# Patient Record
Sex: FEMALE | Race: BLACK OR AFRICAN AMERICAN | NOT HISPANIC OR LATINO | Employment: FULL TIME | URBAN - METROPOLITAN AREA
[De-identification: names, ages, dates, MRNs, and addresses within clinical notes are randomized per-mention and may not be internally consistent; named-entity substitution may affect disease eponyms.]

---

## 2017-08-24 ENCOUNTER — TELEPHONE (OUTPATIENT)
Dept: OBSTETRICS AND GYNECOLOGY | Facility: CLINIC | Age: 27
End: 2017-08-24

## 2017-08-24 DIAGNOSIS — Z32.01 POSITIVE PREGNANCY TEST: Primary | ICD-10-CM

## 2017-08-24 NOTE — TELEPHONE ENCOUNTER
----- Message from Kevin Amaro sent at 8/24/2017 10:49 AM CDT -----  Contact: Pt  X_ 1st Request  _ 2nd Request  _ 3rd Request    Who: JOANNE CHOU [[id number]]    Why: Patient would like to schedule her initial OB appointment, [facility] 7/27    What Number to Call Back: [phone]    When to Expect a call back: (Before the end of the day)  -- if call after 3:00 call back will be tomorrow.

## 2017-08-28 PROCEDURE — 81025 URINE PREGNANCY TEST: CPT | Performed by: EMERGENCY MEDICINE

## 2017-08-28 PROCEDURE — 99285 EMERGENCY DEPT VISIT HI MDM: CPT | Mod: 25

## 2017-08-29 ENCOUNTER — HOSPITAL ENCOUNTER (EMERGENCY)
Facility: HOSPITAL | Age: 27
Discharge: HOME | End: 2017-08-29
Attending: EMERGENCY MEDICINE
Payer: MEDICAID

## 2017-08-29 VITALS
HEART RATE: 100 BPM | DIASTOLIC BLOOD PRESSURE: 98 MMHG | BODY MASS INDEX: 41.32 KG/M2 | RESPIRATION RATE: 16 BRPM | WEIGHT: 288 LBS | TEMPERATURE: 99 F | OXYGEN SATURATION: 100 % | SYSTOLIC BLOOD PRESSURE: 160 MMHG

## 2017-08-29 DIAGNOSIS — B96.89 BV (BACTERIAL VAGINOSIS): ICD-10-CM

## 2017-08-29 DIAGNOSIS — O10.011 PRE-EXISTING ESSENTIAL HYPERTENSION DURING PREGNANCY IN FIRST TRIMESTER: ICD-10-CM

## 2017-08-29 DIAGNOSIS — N76.0 BV (BACTERIAL VAGINOSIS): ICD-10-CM

## 2017-08-29 DIAGNOSIS — O20.0 THREATENED ABORTION AFFECTING INTRAUTERINE PREGNANCY: Primary | ICD-10-CM

## 2017-08-29 LAB
ABO + RH BLD: NORMAL
ALBUMIN SERPL BCP-MCNC: 4.1 G/DL
ALP SERPL-CCNC: 95 U/L
ALT SERPL W/O P-5'-P-CCNC: 15 U/L
ANION GAP SERPL CALC-SCNC: 10 MMOL/L
AST SERPL-CCNC: 14 U/L
B-HCG UR QL: POSITIVE
BACTERIA #/AREA URNS HPF: NORMAL /HPF
BACTERIA GENITAL QL WET PREP: ABNORMAL
BASOPHILS # BLD AUTO: 0.03 K/UL
BASOPHILS NFR BLD: 0.2 %
BILIRUB SERPL-MCNC: 0.4 MG/DL
BILIRUB UR QL STRIP: NEGATIVE
BUN SERPL-MCNC: 10 MG/DL
C TRACH DNA SPEC QL NAA+PROBE: DETECTED
CALCIUM SERPL-MCNC: 9.8 MG/DL
CHLORIDE SERPL-SCNC: 102 MMOL/L
CLARITY UR: CLEAR
CLUE CELLS VAG QL WET PREP: ABNORMAL
CO2 SERPL-SCNC: 24 MMOL/L
COLOR UR: ABNORMAL
CREAT SERPL-MCNC: 0.9 MG/DL
CTP QC/QA: YES
DIFFERENTIAL METHOD: ABNORMAL
EOSINOPHIL # BLD AUTO: 0.3 K/UL
EOSINOPHIL NFR BLD: 1.9 %
ERYTHROCYTE [DISTWIDTH] IN BLOOD BY AUTOMATED COUNT: 16 %
EST. GFR  (AFRICAN AMERICAN): >60 ML/MIN/1.73 M^2
EST. GFR  (NON AFRICAN AMERICAN): >60 ML/MIN/1.73 M^2
FILAMENT FUNGI VAG WET PREP-#/AREA: ABNORMAL
GLUCOSE SERPL-MCNC: 99 MG/DL
GLUCOSE UR QL STRIP: NEGATIVE
HCG INTACT+B SERPL-ACNC: 1854 MIU/ML
HCT VFR BLD AUTO: 39.8 %
HGB BLD-MCNC: 12.8 G/DL
HGB UR QL STRIP: ABNORMAL
KETONES UR QL STRIP: NEGATIVE
LEUKOCYTE ESTERASE UR QL STRIP: NEGATIVE
LYMPHOCYTES # BLD AUTO: 3.4 K/UL
LYMPHOCYTES NFR BLD: 25.4 %
MCH RBC QN AUTO: 24.7 PG
MCHC RBC AUTO-ENTMCNC: 32.2 G/DL
MCV RBC AUTO: 77 FL
MICROSCOPIC COMMENT: NORMAL
MONOCYTES # BLD AUTO: 0.9 K/UL
MONOCYTES NFR BLD: 6.7 %
N GONORRHOEA DNA SPEC QL NAA+PROBE: NOT DETECTED
NEUTROPHILS # BLD AUTO: 8.7 K/UL
NEUTROPHILS NFR BLD: 65.8 %
NITRITE UR QL STRIP: NEGATIVE
PH UR STRIP: 7 [PH] (ref 5–8)
PLATELET # BLD AUTO: 347 K/UL
PMV BLD AUTO: 11.6 FL
POTASSIUM SERPL-SCNC: 3.8 MMOL/L
PROT SERPL-MCNC: 8.6 G/DL
PROT UR QL STRIP: NEGATIVE
RBC # BLD AUTO: 5.18 M/UL
RBC #/AREA URNS HPF: 3 /HPF (ref 0–4)
SODIUM SERPL-SCNC: 136 MMOL/L
SP GR UR STRIP: 1.01 (ref 1–1.03)
SPECIMEN SOURCE: ABNORMAL
T VAGINALIS GENITAL QL WET PREP: ABNORMAL
URATE SERPL-MCNC: 5.5 MG/DL
URN SPEC COLLECT METH UR: ABNORMAL
UROBILINOGEN UR STRIP-ACNC: NEGATIVE EU/DL
WBC # BLD AUTO: 13.26 K/UL
WBC #/AREA URNS HPF: 2 /HPF (ref 0–5)
WBC #/AREA VAG WET PREP: ABNORMAL
YEAST GENITAL QL WET PREP: ABNORMAL

## 2017-08-29 PROCEDURE — 96376 TX/PRO/DX INJ SAME DRUG ADON: CPT

## 2017-08-29 PROCEDURE — 63600175 PHARM REV CODE 636 W HCPCS: Performed by: EMERGENCY MEDICINE

## 2017-08-29 PROCEDURE — 81000 URINALYSIS NONAUTO W/SCOPE: CPT

## 2017-08-29 PROCEDURE — 80053 COMPREHEN METABOLIC PANEL: CPT

## 2017-08-29 PROCEDURE — 86901 BLOOD TYPING SEROLOGIC RH(D): CPT

## 2017-08-29 PROCEDURE — 87210 SMEAR WET MOUNT SALINE/INK: CPT

## 2017-08-29 PROCEDURE — 86900 BLOOD TYPING SEROLOGIC ABO: CPT

## 2017-08-29 PROCEDURE — 84550 ASSAY OF BLOOD/URIC ACID: CPT

## 2017-08-29 PROCEDURE — 96374 THER/PROPH/DIAG INJ IV PUSH: CPT

## 2017-08-29 PROCEDURE — 25000003 PHARM REV CODE 250: Performed by: EMERGENCY MEDICINE

## 2017-08-29 PROCEDURE — 84702 CHORIONIC GONADOTROPIN TEST: CPT

## 2017-08-29 PROCEDURE — 96375 TX/PRO/DX INJ NEW DRUG ADDON: CPT

## 2017-08-29 PROCEDURE — 85025 COMPLETE CBC W/AUTO DIFF WBC: CPT

## 2017-08-29 PROCEDURE — 63600175 PHARM REV CODE 636 W HCPCS: Performed by: NURSE PRACTITIONER

## 2017-08-29 PROCEDURE — 87591 N.GONORRHOEAE DNA AMP PROB: CPT

## 2017-08-29 PROCEDURE — 96361 HYDRATE IV INFUSION ADD-ON: CPT

## 2017-08-29 RX ORDER — SODIUM CHLORIDE 9 MG/ML
125 INJECTION, SOLUTION INTRAVENOUS CONTINUOUS
Status: DISCONTINUED | OUTPATIENT
Start: 2017-08-29 | End: 2017-08-29 | Stop reason: HOSPADM

## 2017-08-29 RX ORDER — METRONIDAZOLE 500 MG/1
500 TABLET ORAL EVERY 12 HOURS
Qty: 14 TABLET | Refills: 0 | Status: SHIPPED | OUTPATIENT
Start: 2017-08-29 | End: 2017-09-05

## 2017-08-29 RX ORDER — HYDRALAZINE HYDROCHLORIDE 20 MG/ML
10 INJECTION INTRAMUSCULAR; INTRAVENOUS
Status: COMPLETED | OUTPATIENT
Start: 2017-08-29 | End: 2017-08-29

## 2017-08-29 RX ORDER — ONDANSETRON 2 MG/ML
4 INJECTION INTRAMUSCULAR; INTRAVENOUS
Status: COMPLETED | OUTPATIENT
Start: 2017-08-29 | End: 2017-08-29

## 2017-08-29 RX ADMIN — ONDANSETRON 4 MG: 2 INJECTION INTRAMUSCULAR; INTRAVENOUS at 05:08

## 2017-08-29 RX ADMIN — HYDRALAZINE HYDROCHLORIDE 10 MG: 20 INJECTION INTRAMUSCULAR; INTRAVENOUS at 04:08

## 2017-08-29 RX ADMIN — HYDRALAZINE HYDROCHLORIDE 10 MG: 20 INJECTION INTRAMUSCULAR; INTRAVENOUS at 05:08

## 2017-08-29 RX ADMIN — SODIUM CHLORIDE 1000 ML: 0.9 INJECTION, SOLUTION INTRAVENOUS at 01:08

## 2017-08-29 NOTE — ED TRIAGE NOTES
Pt states that she was given procardia at her [facility] and started it today around 3pm but has been having an increasingly painful headache today and she states that it feels like her heart is racing and she was going to pass out. .  Pt also has valerie spotting dark blood.  Pt went to have a confirmation of her pregnancy today.

## 2017-08-29 NOTE — ED PROVIDER NOTES
Encounter Date: 2017    SCRIBE #1 NOTE: I, Manenitish Coronado, am scribing for, and in the presence of, Tank Puente NP. Other sections scribed: HPI, ROS.       History     Chief Complaint   Patient presents with    Vaginal Bleeding     Pt states being 5 wks pregnant spotting x's 3 days with a throbbing HA. Saw OB today. Pt reports bleeding is bright red now with lower abd cramping     CC: Vaginal Bleeding, Abdominal Cramps  HPI: This 27 y.o. Gravid female 5w EGA with HTN, PCOS and Hx of abnormal pap smear, , tobacco use presents to the ED c/o vaginal spotting, milky white vaginal discharge, low abdominal cramps for the past few days. Pt states that she [facility] and was told that these Sx were not unusual. Pt states that her spotting has become a dark red, and her abdominal cramps have worsened (now severe). Pt also intermittent nausea (resolved at time of evaluation) and diarrhea for the past few days. Pt also reports pounding HA and palpitations after she took Procardia for the first time yesterday; pt reports Hx of HTN with poor compliance with her Lisinopril. Pt also reports Hx of preeclampsia late in a previous pregnancy; she reports severe HAs and dizziness associated with this event. Pt reports initial appointment with her OB on . Pt has not yet had a US done. Pt denies fever, chills, cough, SOB, chest pain.        The history is provided by the patient.     Review of patient's allergies indicates:  No Known Allergies  Past Medical History:   Diagnosis Date    Abnormal Pap smear of cervix     Anemia     Heavy periods     Hypertension     PCOS (polycystic ovarian syndrome)      Past Surgical History:   Procedure Laterality Date     SECTION       Family History   Problem Relation Age of Onset    Breast cancer Mother     Breast cancer Maternal Aunt     Colon cancer Neg Hx     Ovarian cancer Neg Hx      Social History   Substance Use  Topics    Smoking status: Former Smoker    Smokeless tobacco: Former User    Alcohol use No     Review of Systems   Constitutional: Negative for chills and fever.   HENT: Negative for ear pain and sore throat.    Eyes: Negative for pain and visual disturbance.   Respiratory: Negative for cough and shortness of breath.    Cardiovascular: Positive for palpitations. Negative for chest pain.   Gastrointestinal: Positive for abdominal pain, diarrhea and nausea (resolved at time of evaluation). Negative for vomiting.   Genitourinary: Positive for vaginal bleeding and vaginal discharge. Negative for difficulty urinating and dysuria.   Musculoskeletal: Negative for arthralgias and myalgias.   Skin: Negative for rash and wound.   Neurological: Positive for headaches. Negative for seizures and syncope.       Physical Exam     Initial Vitals [08/28/17 2246]   BP Pulse Resp Temp SpO2   (!) 186/109 100 16 98.9 °F (37.2 °C) 99 %      MAP       134.67         Physical Exam    Nursing note and vitals reviewed.  Constitutional: She appears well-developed and well-nourished. She is not diaphoretic. She is Obese .  Non-toxic appearance. She does not have a sickly appearance. No distress.   HENT:   Head: Normocephalic and atraumatic.   Right Ear: External ear normal.   Left Ear: External ear normal.   Nose: Nose normal.   Eyes: Conjunctivae and EOM are normal. Pupils are equal, round, and reactive to light. Right eye exhibits no discharge. Left eye exhibits no discharge. No scleral icterus.   Neck: Normal range of motion. Neck supple. No thyromegaly present. No tracheal deviation present. No JVD present.   Cardiovascular: Normal rate, regular rhythm, normal heart sounds and intact distal pulses. Exam reveals no gallop and no friction rub.    No murmur heard.  Pulmonary/Chest: Breath sounds normal. No stridor. No respiratory distress. She has no wheezes. She has no rhonchi. She has no rales.   Abdominal: Soft. Bowel sounds are  normal. She exhibits no distension and no mass. There is no tenderness. There is no rebound and no guarding.   Genitourinary: Uterus normal. Pelvic exam was performed with patient supine. Cervix exhibits no motion tenderness, no discharge and no friability. Right adnexum displays no tenderness and no fullness. Left adnexum displays no tenderness and no fullness. There is bleeding in the vagina. No erythema or tenderness in the vagina. No foreign body in the vagina. No signs of injury around the vagina. Vaginal discharge found.   Genitourinary Comments: Cervical os closed   Musculoskeletal: Normal range of motion. She exhibits no edema or tenderness.   Lymphadenopathy:     She has no cervical adenopathy.   Neurological: She is alert and oriented to person, place, and time. She has normal strength and normal reflexes. She displays normal reflexes. No cranial nerve deficit or sensory deficit.   Skin: Skin is warm and dry. No rash and no abscess noted. No erythema. No pallor.   Psychiatric: She has a normal mood and affect. Her behavior is normal. Judgment and thought content normal.         ED Course   Procedures  Labs Reviewed   URINALYSIS - Abnormal; Notable for the following:        Result Value    Occult Blood UA 2+ (*)     All other components within normal limits   CBC W/ AUTO DIFFERENTIAL - Abnormal; Notable for the following:     WBC 13.26 (*)     MCV 77 (*)     MCH 24.7 (*)     RDW 16.0 (*)     Gran # 8.7 (*)     All other components within normal limits   COMPREHENSIVE METABOLIC PANEL - Abnormal; Notable for the following:     Total Protein 8.6 (*)     All other components within normal limits   VAGINAL SCREEN - Abnormal; Notable for the following:     Clue Cells, Wet Prep Many (*)     WBC - Vaginal Screen Rare (*)     Bacteria - Vaginal Screen Moderate (*)     All other components within normal limits   POCT URINE PREGNANCY - Abnormal; Notable for the following:     POC Preg Test, Ur Positive (*)     All  other components within normal limits   C. TRACHOMATIS/N. GONORRHOEAE BY AMP DNA   HCG, QUANTITATIVE, PREGNANCY   URIC ACID   URINALYSIS MICROSCOPIC   GROUP & RH             Medical Decision Making:   Differential Diagnosis:   Ectopic pregnancy, preeclampsia, tubo-ovarian abscess, ovarian torsion, molar pregnancy, PID  Clinical Tests:   Lab Tests: Ordered and Reviewed  Radiological Study: Ordered and Reviewed  ED Management:  27-year-old gravid female with a history of essential hypertension presenting to emergency department for spotty vaginal bleeding that began today. Also reports headache. Patient was taking lisinopril daily but changed to Procardia today due to pregnancy. Also reports abdominal cramping. She denies nausea, vomiting, diarrhea, abdominal pain, chest pain, shortness of breath, or other associated symptoms. Patient is hypertensive. She is afebrile, well-appearing, and in no apparent distress. There is mild blood and discharge in the vaginal vault. There is no discharge, bleeding, or friability of the cervix. Cervical os is closed. No CMT. No adnexal or uterine tenderness on bimanual. Vaginal screen remarkable for bacteria and clue cells. Suspect bacterial vaginosis. Urinalysis unremarkable. No proteinuria. Uric acid is within normal limits. CBC remarkable for leukocytosis, possibly due to pregnancy. Hydralazine given to reduce blood pressure. On reevaluation patient states that her headache is resolved. Ultrasound remarkable for hypochromic structure within the uterine cavity possibly representing gestational sac. No fetal pole or yolk sac is visible. Given patient's reported 5 weeks gestational age it is likely too early to visualize fetal cardiac activity. No evidence of ectopic pregnancy. Prescribed Flagyl at discharge. Instructed patient to take Procardia as prescribed. Instructed patient to follow-up with OB as soon as possible. Strict ED return precautions given. Patient expressed  understanding of diagnosis and discharge instructions.  Other:   I have discussed this case with another health care provider.       <> Summary of the Discussion: Case discussed my attending physician Chris Lobo M.D. who agreed with the assessment and plan.            Scribe Attestation:   Scribe #1: I performed the above scribed service and the documentation accurately describes the services I performed. I attest to the accuracy of the note.    Attending Attestation:           Physician Attestation for Scribe:  Physician Attestation Statement for Scribe #1: I, Tank Puente NP, reviewed documentation, as scribed by Mane Coronado in my presence, and it is both accurate and complete.                 ED Course     Clinical Impression:   The primary encounter diagnosis was Threatened  affecting intrauterine pregnancy. Diagnoses of BV (bacterial vaginosis) and Pre-existing essential hypertension during pregnancy in first trimester were also pertinent to this visit.    Disposition:   Disposition: Discharged  Condition: Stable                      Tank Puente NP  [phone]

## 2017-08-29 NOTE — DISCHARGE INSTRUCTIONS
Follow-up with your OB as discussed.    Begin taking prenatal vitamins daily if you are not already doing so.    Take antibiotic twice a day for 7 days as prescribed until it is gone.    Return to the emergency department for any new or worsening symptoms.

## 2017-09-01 ENCOUNTER — PATIENT MESSAGE (OUTPATIENT)
Dept: OBSTETRICS AND GYNECOLOGY | Facility: CLINIC | Age: 27
End: 2017-09-01

## 2017-09-01 DIAGNOSIS — O20.0 THREATENED MISCARRIAGE: Primary | ICD-10-CM

## 2017-09-06 ENCOUNTER — LAB VISIT (OUTPATIENT)
Dept: LAB | Facility: HOSPITAL | Age: 27
End: 2017-09-06
Attending: OBSTETRICS & GYNECOLOGY
Payer: MEDICAID

## 2017-09-06 DIAGNOSIS — O20.0 THREATENED MISCARRIAGE: ICD-10-CM

## 2017-09-06 LAB — HCG INTACT+B SERPL-ACNC: NORMAL MIU/ML

## 2017-09-06 PROCEDURE — 36415 COLL VENOUS BLD VENIPUNCTURE: CPT | Mod: PO

## 2017-09-06 PROCEDURE — 84702 CHORIONIC GONADOTROPIN TEST: CPT

## 2017-09-14 ENCOUNTER — PATIENT MESSAGE (OUTPATIENT)
Dept: OBSTETRICS AND GYNECOLOGY | Facility: CLINIC | Age: 27
End: 2017-09-14

## 2017-09-14 ENCOUNTER — OFFICE VISIT (OUTPATIENT)
Dept: OBSTETRICS AND GYNECOLOGY | Facility: CLINIC | Age: 27
End: 2017-09-14
Payer: MEDICAID

## 2017-09-14 ENCOUNTER — HOSPITAL ENCOUNTER (OUTPATIENT)
Dept: RADIOLOGY | Facility: OTHER | Age: 27
Discharge: HOME | End: 2017-09-14
Attending: OBSTETRICS & GYNECOLOGY
Payer: MEDICAID

## 2017-09-14 VITALS
DIASTOLIC BLOOD PRESSURE: 118 MMHG | BODY MASS INDEX: 41.82 KG/M2 | WEIGHT: 291.44 LBS | SYSTOLIC BLOOD PRESSURE: 150 MMHG

## 2017-09-14 DIAGNOSIS — I15.9 SECONDARY HYPERTENSION: ICD-10-CM

## 2017-09-14 DIAGNOSIS — N93.9 VAGINAL BLEEDING: ICD-10-CM

## 2017-09-14 DIAGNOSIS — N93.9 VAGINAL BLEEDING: Primary | ICD-10-CM

## 2017-09-14 PROCEDURE — 99214 OFFICE O/P EST MOD 30 MIN: CPT | Mod: S$PBB,,, | Performed by: OBSTETRICS & GYNECOLOGY

## 2017-09-14 PROCEDURE — 82570 ASSAY OF URINE CREATININE: CPT

## 2017-09-14 PROCEDURE — 99999 PR PBB SHADOW E&M-EST. PATIENT-LVL II: CPT | Mod: PBBFAC,,, | Performed by: OBSTETRICS & GYNECOLOGY

## 2017-09-14 PROCEDURE — 87591 N.GONORRHOEAE DNA AMP PROB: CPT

## 2017-09-14 PROCEDURE — 3008F BODY MASS INDEX DOCD: CPT | Mod: ,,, | Performed by: OBSTETRICS & GYNECOLOGY

## 2017-09-14 PROCEDURE — 99212 OFFICE O/P EST SF 10 MIN: CPT | Mod: PBBFAC,25 | Performed by: OBSTETRICS & GYNECOLOGY

## 2017-09-14 PROCEDURE — 87086 URINE CULTURE/COLONY COUNT: CPT

## 2017-09-14 PROCEDURE — 76801 OB US < 14 WKS SINGLE FETUS: CPT | Mod: TC

## 2017-09-14 PROCEDURE — 76817 TRANSVAGINAL US OBSTETRIC: CPT | Mod: TC

## 2017-09-14 PROCEDURE — 76817 TRANSVAGINAL US OBSTETRIC: CPT | Mod: 26,,, | Performed by: RADIOLOGY

## 2017-09-14 PROCEDURE — 76801 OB US < 14 WKS SINGLE FETUS: CPT | Mod: 26,,, | Performed by: RADIOLOGY

## 2017-09-14 RX ORDER — LABETALOL 200 MG/1
200 TABLET, FILM COATED ORAL 2 TIMES DAILY
Qty: 60 TABLET | Refills: 11 | Status: SHIPPED | OUTPATIENT
Start: 2017-09-14 | End: 2017-10-17

## 2017-09-14 NOTE — PROGRESS NOTES
CC: Absence of menses    Kaylie Fuentes is a 27 y.o. female  presents with complaint of absence of menstruation.  She denies nausea/vomIting/abdominal pain/  HAVING bleeding off and on.  UPT is positive.   BHCG 47106   Elevated BP but did not take procardia due to HA  US shows    Findings: The uterus measures 10.8 x 5.8 x 6.8 cm. Uterine parenchyma is homogeneous. In the uterine cavity there is a round hypoechoic structure possibly representing a gestational sac with a mean diameter of 0.6 cm. No fetal pole or yolk sac is identified. The endometrial stripe measures 1.5 cm. The cervix measures 3.5 cm.    The right ovary measures 4.0 x 2.9 x 2.9 cm. The left ovary measures 2.9 x 1.6 x 1.9 cm. Arterial and venous flow are preserved bilaterally. There is a possible corpus luteum cyst measuring 2.2 cm seen in the right ovary. No significant free fluid is present within the pelvis.   Impression         Hypoechoic structure within the uterine cavity measuring 0.6 cm which may represent a gestational sac. No fetal pole or yolk sac is identified. This may represent an intrauterine pregnancy which is too early to be seen by ultrasound. Additional considerations include spontaneous  or unseen ectopic pregnancy, noting no suspicious adnexal masses are identified on today's examination. Serial beta hCG sampling is recommended with repeat ultrasound as clinically indicated.             Past Medical History:   Diagnosis Date    Abnormal Pap smear of cervix     Anemia     Heavy periods     Hypertension     PCOS (polycystic ovarian syndrome)      Past Surgical History:   Procedure Laterality Date     SECTION       Social History     Social History    Marital status: Single     Spouse name: N/A    Number of children: N/A    Years of education: N/A     Social History Main Topics    Smoking status: Former Smoker    Smokeless tobacco: Former User    Alcohol use No    Drug use: No    Sexual activity:  Yes     Partners: Male     Other Topics Concern    None     Social History Narrative    None     Family History   Problem Relation Age of Onset    Breast cancer Mother     Breast cancer Maternal Aunt     Colon cancer Neg Hx     Ovarian cancer Neg Hx      OB History    Para Term  AB Living   2 1       1   SAB TAB Ectopic Multiple Live Births           1      # Outcome Date GA Lbr Aiden/2nd Weight Sex Delivery Anes PTL Lv   2 Current            1 Para 11    M CS-LTranv   KALINA          BP (!) 150/118   Wt 132.2 kg (291 lb 7.2 oz)   LMP 2017   BMI 41.82 kg/m²     ROS:  GENERAL: Denies weight gain or weight loss. Feeling well overall.   SKIN: Denies rash or lesions.   HEAD: Denies head injury or headache.   NODES: Denies enlarged lymph nodes.   CHEST: Denies chest pain or shortness of breath.   CARDIOVASCULAR: Denies palpitations or left sided chest pain.   ABDOMEN: No abdominal pain, constipation, diarrhea, nausea, vomiting or rectal bleeding.   URINARY: No frequency, dysuria, hematuria, or burning on urination.  REPRODUCTIVE: See HPI.   BREASTS: The patient performs breast self-examination and denies pain, lumps, or nipple discharge.   HEMATOLOGIC: No easy bruisability or excessive bleeding.   MUSCULOSKELETAL: Denies joint pain or swelling.   NEUROLOGIC: Denies syncope or weakness.   PSYCHIATRIC: Denies depression, anxiety or mood swings.    PE:   APPEARANCE: Well nourished, well developed, in no acute distress.  AFFECT: WNL, alert and oriented x 3.  SKIN: No acne or hirsutism.  NECK: Neck symmetric without masses or thyromegaly.  NODES: No inguinal, cervical, axillary or femoral lymph node enlargement.  CHEST: Good respiratory effort.   ABDOMEN: Soft. No tenderness or masses. No hepatosplenomegaly. No hernias.  BREASTS: Symmetrical, no skin changes or visible lesions. No palpable masses, nipple discharge bilaterally.  PELVIC: Normal external female genitalia without lesions. Normal hair  distribution. Adequate perineal body, normal urethral meatus. Vagina moist and well rugated without lesions or discharge. Cervix pink, without lesions, discharge or tenderness. No significant cystocele or rectocele. Bimanual exam shows uterus is 7 weeks, regular, mobile and nontender. Adnexa without masses or tenderness.  EXTREMITIES: No edema.          ASSESSMENT and PLAN:    ICD-10-CM ICD-9-CM    1. Vaginal bleeding N93.9 623.8 HIV-1 and HIV-2 antibodies      RPR      Hepatitis B surface antigen      Type & Screen      Rubella antibody, IgG      Urine culture      CBC auto differential      Basic metabolic panel      US MFM Procedure ([facility])      C. trachomatis/N. gonorrhoeae by AMP DNA Cervix      POCT urine pregnancy      Protein / creatinine ratio, urine      Comprehensive metabolic panel      US Pelvis Comp with Transvag NON-OB (xpd      hCG, quantitative      Progesterone   2. Secondary hypertension I15.9 405.99 labetalol (NORMODYNE) 200 MG tablet         Patient was counseled today on HTN concerns  + CT-  Was treated and did SHERICE today  No abdominal pain or pressure.  WIll do stat TVUS to check on pregnancy  Follow up on labs  Will start on labetolol 200 mg BID     F/U next week to check on BP

## 2017-09-15 LAB
C TRACH DNA SPEC QL NAA+PROBE: NOT DETECTED
CREAT UR-MCNC: 308 MG/DL
N GONORRHOEA DNA SPEC QL NAA+PROBE: NOT DETECTED
PROT UR-MCNC: 27 MG/DL
PROT/CREAT RATIO, UR: 0.09

## 2017-09-16 LAB
BACTERIA UR CULT: NORMAL
BACTERIA UR CULT: NORMAL

## 2017-09-19 ENCOUNTER — PATIENT MESSAGE (OUTPATIENT)
Dept: OBSTETRICS AND GYNECOLOGY | Facility: CLINIC | Age: 27
End: 2017-09-19

## 2017-09-22 ENCOUNTER — ROUTINE PRENATAL (OUTPATIENT)
Dept: OBSTETRICS AND GYNECOLOGY | Facility: CLINIC | Age: 27
End: 2017-09-22
Payer: MEDICAID

## 2017-09-22 VITALS
BODY MASS INDEX: 41.63 KG/M2 | DIASTOLIC BLOOD PRESSURE: 110 MMHG | WEIGHT: 290.13 LBS | SYSTOLIC BLOOD PRESSURE: 158 MMHG

## 2017-09-22 DIAGNOSIS — Z34.80 SUPERVISION OF OTHER NORMAL PREGNANCY: Primary | ICD-10-CM

## 2017-09-22 DIAGNOSIS — O16.1 ELEVATED BLOOD PRESSURE AFFECTING PREGNANCY IN FIRST TRIMESTER, ANTEPARTUM: ICD-10-CM

## 2017-09-22 DIAGNOSIS — Z01.30 BP CHECK: ICD-10-CM

## 2017-09-22 PROCEDURE — 99999 PR PBB SHADOW E&M-EST. PATIENT-LVL III: CPT | Mod: PBBFAC,,, | Performed by: NURSE PRACTITIONER

## 2017-09-22 PROCEDURE — 3008F BODY MASS INDEX DOCD: CPT | Mod: ,,, | Performed by: NURSE PRACTITIONER

## 2017-09-22 PROCEDURE — 99212 OFFICE O/P EST SF 10 MIN: CPT | Mod: TH,S$PBB,, | Performed by: NURSE PRACTITIONER

## 2017-09-22 PROCEDURE — 99213 OFFICE O/P EST LOW 20 MIN: CPT | Mod: PBBFAC | Performed by: NURSE PRACTITIONER

## 2017-09-22 RX ORDER — LABETALOL 200 MG/1
200 TABLET, FILM COATED ORAL 3 TIMES DAILY
Qty: 90 TABLET | Refills: 6 | Status: SHIPPED | OUTPATIENT
Start: 2017-09-22 | End: 2017-10-17

## 2017-09-22 NOTE — LETTER
September 22, 2017      [facility] [address]  [address]  [place] [address]  Phone: [phone]  Fax: [phone]       Patient: Kaylie Fuentes   YOB: 1990  Date of Visit: 09/22/2017    To Whom It May Concern:    Michelle Fuentes  was at [facility] on 09/22/2017. She is currently pregnant with estimated due date of 5/5/18.  Please restrict patient to no heaving lifting- nothing greater than 20 pounds, avoid change overs on the Rovema machine, and allow for 2- 15 minutes breaks per shift for the duration of the pregnancy  If you have any questions or concerns, or if I can be of further assistance, please do not hesitate to contact me.    Sincerely,      SUKI JansenC

## 2017-09-22 NOTE — PROGRESS NOTES
Here for routine OB appt at 7w6d for a BP check.  BP today is 158/110.  She is taking Labetalol 200 mg BID.  Denies any HA, BV, CP or SOB.  Increased Labetalol to 200 mg TID.  MFM consult ordered for uncontrolled HTN.  No complaints, denies VB and cramping.  Pain, bleeding, and ED precautions given.  F/U scheduled 1 week for BP check

## 2017-10-09 ENCOUNTER — ROUTINE PRENATAL (OUTPATIENT)
Dept: OBSTETRICS AND GYNECOLOGY | Facility: CLINIC | Age: 27
End: 2017-10-09
Payer: MEDICAID

## 2017-10-09 ENCOUNTER — HOSPITAL ENCOUNTER (OUTPATIENT)
Dept: RADIOLOGY | Facility: OTHER | Age: 27
Discharge: HOME | End: 2017-10-09
Attending: OBSTETRICS & GYNECOLOGY
Payer: MEDICAID

## 2017-10-09 VITALS — BODY MASS INDEX: 42.07 KG/M2 | WEIGHT: 293 LBS | SYSTOLIC BLOOD PRESSURE: 158 MMHG | DIASTOLIC BLOOD PRESSURE: 104 MMHG

## 2017-10-09 DIAGNOSIS — O16.1 HYPERTENSION AFFECTING PREGNANCY IN FIRST TRIMESTER: ICD-10-CM

## 2017-10-09 DIAGNOSIS — O16.1 HYPERTENSION AFFECTING PREGNANCY IN FIRST TRIMESTER: Primary | ICD-10-CM

## 2017-10-09 PROCEDURE — 76801 OB US < 14 WKS SINGLE FETUS: CPT | Mod: 26,,, | Performed by: RADIOLOGY

## 2017-10-09 PROCEDURE — 99213 OFFICE O/P EST LOW 20 MIN: CPT | Mod: PBBFAC,25 | Performed by: OBSTETRICS & GYNECOLOGY

## 2017-10-09 PROCEDURE — 76817 TRANSVAGINAL US OBSTETRIC: CPT | Mod: TC

## 2017-10-09 PROCEDURE — 76817 TRANSVAGINAL US OBSTETRIC: CPT | Mod: 26,,, | Performed by: RADIOLOGY

## 2017-10-09 PROCEDURE — 99213 OFFICE O/P EST LOW 20 MIN: CPT | Mod: TH,S$PBB,, | Performed by: OBSTETRICS & GYNECOLOGY

## 2017-10-09 PROCEDURE — 76801 OB US < 14 WKS SINGLE FETUS: CPT | Mod: TC

## 2017-10-09 PROCEDURE — 99999 PR PBB SHADOW E&M-EST. PATIENT-LVL III: CPT | Mod: PBBFAC,,, | Performed by: OBSTETRICS & GYNECOLOGY

## 2017-10-10 ENCOUNTER — TELEPHONE (OUTPATIENT)
Dept: OBSTETRICS AND GYNECOLOGY | Facility: CLINIC | Age: 27
End: 2017-10-10

## 2017-10-10 DIAGNOSIS — I10 HYPERTENSION, UNSPECIFIED TYPE: Primary | ICD-10-CM

## 2017-10-10 NOTE — TELEPHONE ENCOUNTER
----- Message from Jo Meek sent at 10/10/2017 10:20 AM CDT -----  Contact: Lily  X _1st Request  _  2nd Request  _  3rd Request    Who:Lily [facility]     Why:Per Lily Rahman ordered LABETALOL HCL (INV LABETALOL) 200 MG tablet she selected the wrong one she should have selected the one without the INV in front of it.    What Number to Call Back:[phone]    When to Expect a call back: (Before the end of the day)   -- if call after 3:00 call back will be tomorrow.

## 2017-10-10 NOTE — TELEPHONE ENCOUNTER
Pharmacy is requesting verification of patients labetalol prescription. The prescription sent was Labetalol Hcl (INV labetolol).

## 2017-10-11 ENCOUNTER — PROCEDURE VISIT (OUTPATIENT)
Dept: OBSTETRICS AND GYNECOLOGY | Facility: CLINIC | Age: 27
End: 2017-10-11
Payer: MEDICAID

## 2017-10-11 DIAGNOSIS — Z32.01 POSITIVE PREGNANCY TEST: ICD-10-CM

## 2017-10-11 DIAGNOSIS — Z36.89 ESTABLISH GESTATIONAL AGE, ULTRASOUND: ICD-10-CM

## 2017-10-11 DIAGNOSIS — N91.2 AMENORRHEA: ICD-10-CM

## 2017-10-11 PROCEDURE — 76817 TRANSVAGINAL US OBSTETRIC: CPT | Mod: 26,S$PBB,, | Performed by: OBSTETRICS & GYNECOLOGY

## 2017-10-11 PROCEDURE — 76817 TRANSVAGINAL US OBSTETRIC: CPT | Mod: PBBFAC | Performed by: OBSTETRICS & GYNECOLOGY

## 2017-10-11 RX ORDER — LABETALOL 200 MG/1
400 TABLET, FILM COATED ORAL 2 TIMES DAILY
Qty: 120 TABLET | Refills: 11 | Status: ON HOLD | OUTPATIENT
Start: 2017-10-11 | End: 2019-09-16

## 2017-10-11 NOTE — TELEPHONE ENCOUNTER
I sent in a new Rx for labetalol for a dose of 400 mg PO BID. Please comfirm with the pharm   Thanks  Nancy

## 2017-10-11 NOTE — PROCEDURES
Procedures   Obstetrical ultrasound completed today.  See report in imaging section of [facility].

## 2017-10-12 ENCOUNTER — TELEPHONE (OUTPATIENT)
Dept: OBSTETRICS AND GYNECOLOGY | Facility: CLINIC | Age: 27
End: 2017-10-12

## 2017-10-12 ENCOUNTER — PATIENT MESSAGE (OUTPATIENT)
Dept: OBSTETRICS AND GYNECOLOGY | Facility: CLINIC | Age: 27
End: 2017-10-12

## 2017-10-12 NOTE — PROGRESS NOTES
HERE for routine OB visit at 10 2/7 wks.  SEVERE BP but no Sx.  She is on labetalol 200 TID.  She does not want to take procardia Due to SE..  Denies vaginal bleeding, cramping.  PRE E labs.  Discussed concerns of uncontrolled HTN.  WIll have a consult with MFM.  F/U next week.

## 2017-10-12 NOTE — TELEPHONE ENCOUNTER
Called patient. No answer. Left voice message for patient to call the office.     Calling to make sure patient got her prescription.

## 2017-10-17 ENCOUNTER — CLINICAL SUPPORT (OUTPATIENT)
Dept: OBSTETRICS AND GYNECOLOGY | Facility: CLINIC | Age: 27
End: 2017-10-17
Payer: MEDICAID

## 2017-10-17 ENCOUNTER — TELEPHONE (OUTPATIENT)
Dept: OBSTETRICS AND GYNECOLOGY | Facility: CLINIC | Age: 27
End: 2017-10-17

## 2017-10-17 VITALS
SYSTOLIC BLOOD PRESSURE: 162 MMHG | BODY MASS INDEX: 41.95 KG/M2 | HEIGHT: 70 IN | DIASTOLIC BLOOD PRESSURE: 110 MMHG | WEIGHT: 293 LBS

## 2017-10-17 DIAGNOSIS — Z01.30 BP CHECK: ICD-10-CM

## 2017-10-17 DIAGNOSIS — Z34.80 SUPERVISION OF OTHER NORMAL PREGNANCY, ANTEPARTUM: Primary | ICD-10-CM

## 2017-10-17 DIAGNOSIS — O16.1 HYPERTENSION AFFECTING PREGNANCY IN FIRST TRIMESTER: ICD-10-CM

## 2017-10-17 LAB
CREAT UR-MCNC: 206 MG/DL
PROT UR-MCNC: 25 MG/DL
PROT/CREAT RATIO, UR: 0.12

## 2017-10-17 PROCEDURE — 99212 OFFICE O/P EST SF 10 MIN: CPT | Mod: TH,S$PBB,, | Performed by: NURSE PRACTITIONER

## 2017-10-17 PROCEDURE — 82570 ASSAY OF URINE CREATININE: CPT

## 2017-10-17 PROCEDURE — 99213 OFFICE O/P EST LOW 20 MIN: CPT | Mod: PBBFAC | Performed by: NURSE PRACTITIONER

## 2017-10-17 PROCEDURE — 99999 PR PBB SHADOW E&M-EST. PATIENT-LVL III: CPT | Mod: PBBFAC,,, | Performed by: NURSE PRACTITIONER

## 2017-10-17 RX ORDER — NIFEDIPINE 30 MG/1
30 TABLET, EXTENDED RELEASE ORAL DAILY
Qty: 30 TABLET | Refills: 11 | Status: ON HOLD | OUTPATIENT
Start: 2017-10-17 | End: 2018-03-03

## 2017-10-17 NOTE — TELEPHONE ENCOUNTER
----- Message from Tyler Marquez MA sent at 10/17/2017 11:55 AM CDT -----  Pt is needing a 1 wk routine appt and pressure check.  The pt can be reached at [phone].  Thanks FL

## 2017-10-17 NOTE — PROGRESS NOTES
Here for BP check at 11w3d.  BP today is 162/110.  She is currently taking Labetalol 400 mg BID.  Discussed to continue Labetalol and added Procardia XL 30 mg daily.  Baseline PC ratio today.   No complaints, denies VB and cramping.  Pain, bleeding, and ED precautions given.  F/U scheduled 1 week for BP check

## 2017-10-24 ENCOUNTER — PATIENT MESSAGE (OUTPATIENT)
Dept: OBSTETRICS AND GYNECOLOGY | Facility: CLINIC | Age: 27
End: 2017-10-24

## 2017-10-30 ENCOUNTER — ROUTINE PRENATAL (OUTPATIENT)
Dept: OBSTETRICS AND GYNECOLOGY | Facility: CLINIC | Age: 27
End: 2017-10-30
Payer: MEDICAID

## 2017-10-30 ENCOUNTER — PATIENT MESSAGE (OUTPATIENT)
Dept: OBSTETRICS AND GYNECOLOGY | Facility: CLINIC | Age: 27
End: 2017-10-30

## 2017-10-30 VITALS
DIASTOLIC BLOOD PRESSURE: 98 MMHG | WEIGHT: 291.25 LBS | SYSTOLIC BLOOD PRESSURE: 148 MMHG | BODY MASS INDEX: 41.79 KG/M2

## 2017-10-30 DIAGNOSIS — Z3A.13 13 WEEKS GESTATION OF PREGNANCY: ICD-10-CM

## 2017-10-30 DIAGNOSIS — I10 CHRONIC HYPERTENSION: Primary | ICD-10-CM

## 2017-10-30 PROCEDURE — 99999 PR PBB SHADOW E&M-EST. PATIENT-LVL I: CPT | Mod: PBBFAC,,, | Performed by: OBSTETRICS & GYNECOLOGY

## 2017-10-30 PROCEDURE — 99213 OFFICE O/P EST LOW 20 MIN: CPT | Mod: TH,S$PBB,, | Performed by: OBSTETRICS & GYNECOLOGY

## 2017-10-30 PROCEDURE — 99211 OFF/OP EST MAY X REQ PHY/QHP: CPT | Mod: PBBFAC | Performed by: OBSTETRICS & GYNECOLOGY

## 2017-10-30 NOTE — PROGRESS NOTES
HERE for routine OB visit at 13 2 5/7 wks, with NO complaints.  Denies vaginal bleeding, cramping.  On Labetalol 400 BID and procardia.  Feeling better. US shows FHTs 150s.  MFM consult scheduled.  MFM scan at 20- 22 weeks.  Quad screen with next visit.  F/U 4 wks

## 2017-11-06 ENCOUNTER — PATIENT MESSAGE (OUTPATIENT)
Dept: OBSTETRICS AND GYNECOLOGY | Facility: CLINIC | Age: 27
End: 2017-11-06

## 2017-11-06 DIAGNOSIS — O16.2 HYPERTENSION AFFECTING PREGNANCY IN SECOND TRIMESTER: Primary | ICD-10-CM

## 2017-11-07 ENCOUNTER — INITIAL CONSULT (OUTPATIENT)
Dept: MATERNAL FETAL MEDICINE | Facility: CLINIC | Age: 27
End: 2017-11-07
Payer: MEDICAID

## 2017-11-07 VITALS
DIASTOLIC BLOOD PRESSURE: 92 MMHG | SYSTOLIC BLOOD PRESSURE: 140 MMHG | BODY MASS INDEX: 41.09 KG/M2 | WEIGHT: 286.38 LBS

## 2017-11-07 DIAGNOSIS — O16.2 HYPERTENSION AFFECTING PREGNANCY IN SECOND TRIMESTER: ICD-10-CM

## 2017-11-07 DIAGNOSIS — E28.2 PCOS (POLYCYSTIC OVARIAN SYNDROME): ICD-10-CM

## 2017-11-07 PROCEDURE — 76815 OB US LIMITED FETUS(S): CPT | Mod: 26,S$PBB,, | Performed by: OBSTETRICS & GYNECOLOGY

## 2017-11-07 PROCEDURE — 76815 OB US LIMITED FETUS(S): CPT | Mod: PBBFAC | Performed by: OBSTETRICS & GYNECOLOGY

## 2017-11-07 PROCEDURE — 99212 OFFICE O/P EST SF 10 MIN: CPT | Mod: PBBFAC | Performed by: OBSTETRICS & GYNECOLOGY

## 2017-11-07 PROCEDURE — 99203 OFFICE O/P NEW LOW 30 MIN: CPT | Mod: S$PBB,TH,25, | Performed by: OBSTETRICS & GYNECOLOGY

## 2017-11-07 PROCEDURE — 99999 PR PBB SHADOW E&M-EST. PATIENT-LVL II: CPT | Mod: PBBFAC,,, | Performed by: OBSTETRICS & GYNECOLOGY

## 2017-11-07 NOTE — LETTER
November 8, 2017      Nancy Lamb MD  [address]  [address]  [place] [address]           [facility]  [address]  [place] [address]  Phone: [phone]          Patient: Kaylie Fuentes   MR Number: [id number]   YOB: 1990   Date of Visit: 11/7/2017       Dear Dr. Nancy Lamb:    Thank you for referring Kaylie Fuentes to me for evaluation. Attached you will find relevant portions of my assessment and plan of care.    If you have questions, please do not hesitate to call me. I look forward to following Kaylie Fuentes along with you.    Sincerely,    Mela Rogers MD    Enclosure  CC:  No Recipients    If you would like to receive this communication electronically, please contact [email or web address] or [phone] to request more information on [facility] access.    For providers and/or their staff who would like to refer a patient to [facility], please contact us through our one-stop-shop provider referral line, [facility], [place], at [phone].    If you feel you have received this communication in error or would no longer like to receive these types of communications, please e-mail [email or web address]

## 2017-11-08 NOTE — PROGRESS NOTES
Indication  ========    Consultation high BP; FHT.    Maternal Assessment  =================    Weight 129 kg  Weight (lb) 284 lb  BP syst 140 mmHg  BP diast 92 mmHg    Method  ======    Transabdominal ultrasound examination. View: Suboptimal view: limited by maternal body habitus.    Pregnancy  =========    Phillip pregnancy. Number of fetuses: 1.    Dating  ======    LMP on: 2017  GA by LMP 15 w + 2 d  ANDERSON by LMP: 2018  Assigned: Dating performed on 2017, based on the LMP  Assigned GA 15 w + 2 d  Assigned ANDERSON: 2018    General Evaluation  ==============    Cardiac activity: present.  bpm.  Fetal movements: visualized.  Presentation: cephalic.    Fetal Biometry  ============    Fetal Biometry  Calculated by: Hadlock ([facility]-AC-FL)   bpm      Consultation  ==========    Type: CHTN.  Ms. Fuentes is a 26y/o who will be 28 at delivery  who presents for consultation due to CHTN. She is dated based on her LMP being  equal to a scan done in our office on 10/11/17. Please note the patient did have earlier scans in radiology which gave varying EDDs. The  ultrasound at 6 weeks and 5 days in radiology altered her ANDERSON from LMP but the subsequent radiology ultrasound and our first trimester  ultrasound agreed with LMP dating. Therefore, it was recommended to use 18 as her ANDERSON making her 15 weeks and 2 days gestation. It  appears as if her HTN was poorly controlled initially. She was on Lisinopril prior to pregnancy and early in pregnancy but reports she was not  compliant with her medications. Dr. Lamb has titrated her medications and she is now on dual therapy with Labetolol 400mg Bid and  Procardia XL 30mg daily.    PMHx: CHTN, PCOS  PSHx: Csection  Social: Denies smoking, alcohol, illicit drug use  NKDA  Family history: no history of birth defects, no history of sickle cell  POBhx: Csection in -NRFHT and preeclampsia with severe features    /92  , MCV 79, Hgb  12.3, Plt 315, Cr 0.8, AST and ALT normal, Uric Acid 5.6, urine P/D 0.12, Positive antibody screen with negative direct  amara (negative antibody screen )    A/P:  1. IUP at 15 and 2    2. Dating-please see above note. Please feel free to contact me if any questions.    3. CHTN:She was counseled on maternal/fetal risks associated with CHTN during pregnancy. These include but are not limited to worsening  hypertension, superimposed preeclampsia (which may necessitate  delivery), placental abruption,  delivery and low but  increased likelihood of stroke, renal failure, and cardiac failure. Fetal risks include miscarriage, stillbirth, growth restriction, prematurity and   death. Risks are increased with evidence of renal dysfunction (serum creatinine >1.1 mg/dL or baseline proteinuria).    -We have scheduled patient for a follow up ultrasound 4-5 weeks for fetal anatomic survey.    - Serial ultrasounds for fetal growth starting at 24-28 weeks gestation.    - Baseline labs: CMP, CBC, Protein/creatinine ratio or 24 hour urine for protein and creatinine clearance are recommended. If the patient has a  urine p/c ratio of =0.3, then a 24 hour collection should be performed. If the patients creatinine is = 1.1, this should be repeated each trimester.      -Glucose screen at this time secondary to PCOS and obesity and HTN and if negative repeat at 24-28 wks gestation.    - Close monitoring of patient's blood pressures:  If BP < 160 mmHg systolic or 105 mmHg diastolic and no evidence of end-organ damage, no antihypertensive therapy suggested    If patient on antihypertensive medication, it is suggested that BP levels be maintained between 120 mmHg systolic and 80mmHg diastolic  and  160 mmHg systolic and 105 mmHg diastolic    However, given multiple agents, it would be more ideal for her blood pressures to be less than 150 over 100.    - Fetal surveillance recommended (ex. NST twice a week and  "weekly MVP)  at 32 weeks of gestation or earlier if indicated.    - Timing of delivery: If CHTN with no additional maternal or fetal complications, delivery is recommended at 37 to 38 weeks as the patient is on  multiple medications. However, earlier delivery may be warranted depending on the clinical scenario. We discussed that typically we try to  titrate one medication up before adding an additional agent, however, it appears as if her response to Labetolol alone was suboptimal compared  with the addition of the Procardia. Therefore, we will maintain this current regimen. We discussed that with dual agents we recommend earlier  Delivery and risks and benefits of this to patient and fetus.    -With her BMI and longstanding HTN, recommend obtaining a baseline EKG and a maternal echocardiogram.    -Begin baby aspirin 81mg PO daily for preeclampsia prevention at 12-14 weeks gestation.    - Opthalmic exam if not recently performed due to longstanding HTN.    - Postpartum, the patient should be placed on her prepregnancy regimen unless other therapy is thought to be optimal. For those newly  diagnosed during pregnancy without other comorbidities, antihypertensive therapy should be implemented for pressures =150 systolic or =100  diastolic.    -If the patient's blood pressures become difficult to control, secondary etiologies may need to be assessed for.    The patient's blood pressures are currently within range for a chronic hypertensive.    4. History of preeclampsia:Per Up to Date: "A 2015 meta-analysis of individual patient data from over 75,000 women with  preeclampsia who became pregnant again found that 20 percent developed hypertension in a subsequent pregnancy and 16 percent  developed  recurrent preeclampsia.  The recurrence risk varies with the severity and time of onset of the acute episode. Women with early onset, severe preeclampsia are at  greatest risk of recurrence (as high as 25 to 65 percent). The " "risk is much lower (5 to 7 percent) in women who had non-severe preeclampsia  during the first pregnancy, versus less than 1 percent in women who had a normotensive first pregnancy (does not apply to abortions). In a  series of 125 women with severe second trimester preeclampsia followed for five years, 65 percent developed recurrent preeclampsia and 35  percent were normotensive in their subsequent pregnancy. Of the preeclamptic group, approximately one-third developed the disease at less  than or equal to 27 weeks, one-third at 28 to 36 weeks, and one-third at greater than or equal to 37 weeks. Thus, 21 percent of subsequent  pregnancies were complicated by severe preeclampsia in the second trimester. "  Recurrent preeclampsia is more likely following a preeclamptic jimenez pregnancy than a twin pregnancy.  Early onset preeclampsia with severe features has been associated with an increased risk of adverse outcomes in subsequent pregnancies  such as preeclampsia, growth restriction,  delivery, abruptio placenta, and stillbirth. We also discussed the long term risk to develop  hypertension and end organ damage. Low dose baby aspirin may play a role in decreasing the risk of recurrence. We reviewed that  preeclampsia can occur earlier in a subsequent pregnancy. Given her hypertension, I would recommend prenatal visits every 2 weeks until 28  weeks if stable and then consider beginning weekly visits.    5.Obesity:Maternal Complications of Obesity  Obesity adversely affects maternal health by predisposing patients to gestational hypertension (OR = 2.5-3.2), preeclampsia (1.6-3.3),  gestational diabetes (2.6-4.0), and fetal macrosomia (1.7-1.9). Obese patients, as compared to those of normal weight, are also more likely to  undergo  delivery (47.4% vs. 20.7%) with higher rates of operative complications - greater blood loss, longer operative times, and  increased wound infection/endometritis. Anesthetic " difficulties with both regional anesthesia as well as general endotracheal intubation may  complicate delivery.    Fetal Complications of Obesity  Obesity is an independent risk factor for spontaneous  after both natural conception as well as conception following infertility treatment.  For ongoing pregnancies, fetal risks include prematurity, stillbirth, fetal demise, and macrosomia. Moreover, some studies indicate that fetuses  of obese women are more likely to have congenital anomalies (NTDs, cardiac, and gastrointestinal) even when controlled for pregestational  diabetes.    Management of Obesity in Pregnancy  At the first prenatal visit, height and weight should be recorded and used to calculate BMI. Based on the patients weight, recommendations for  appropriate weight gain should be made. The [place] [employer] recommends a gain of 25-35 lb for women of normal weight, 15-25 pounds  for overweight women, and 11-20 pounds for obese women. Overweight and obese women should be counseled on diet and exercise.    Because of the maternal complications of obesity, certain additional measures are suggested in the antepartum period.  Patients should receive adequate counseling regarding risk factors and possible difficulties with accurate labor monitoring,  delivery  operative complications, and anesthesia.  With the increased risk of gestational diabetes, early 1st trimester screening is often advocated, however, there are no data demonstrating the  effectiveness of this early screening in improving pregnancy outcomes. Until such data are available, early screening should generally be  reserved for patients with a prior history of gestational diabetes or those with a diagnosis of borderline diabetes in the non-pregnant state.  All patients should have a routine anatomy ultrasound. Because of the increased risk of congenital anomalies, all patients with a BMI greater  than or equal 40 should undergo  targeted ultrasound (TSCAN) between 19-21 weeks gestation. If abnormal screening views or cardiac  anomalies are noted, or if the patient has concomitant diabetes, formal fetal echocardiography should be performed.  Fetal growth assessment via fundal height monitoring is difficult in the setting of obesity. When the fundal height cannot be reliably followed or  BMI greater than or equal to 35, serial ultrasounds for fetal growth every 4 weeks are recommended to screen for growth abnormalities starting  at 28 weeks.  Patients with obesity are at increased risk for cardiac and pulmonary complications including sleep apnea, hypertension and cardiomyopathy.  Providers should have a low threshold to seek further evaluation of the cardiac status--echocardiogram, EKG--in patients who present with  symptoms suggestive of cardiac or pulmonary compromise. In addition, given the risk of anesthetic difficulties, consultation with an  anesthesiologist is suggested for patients with a BMI greater than or equal to maternal weight 400 lb, or patients with sleep apnea.  Patients should be counseled on weight loss in the postpartum period.    Intrapartum and Postpartum    The obese patient has an increased risk for abnormal labor progression and  delivery. All patients with a scheduled  should  be instructed to shower with antibacterial soap and wash the abdomen on the day of surgery with cholorhexidine soap. Given the increased  risk of  delivery in the obese population, all obese patients who are undergoing induction of labor should also be instructed to shower  with antibacterial soap prior to the initiation of the induction.    All patients should receive antibiotic prophylaxis for  delivery; however, recent studies suggest that obese patients may require a  higher dosage. If cefazolin is utilized, a weight-based dosing regimen is recommended:  <80kg 1 gm  81-160kg 2 gm  >160kg 3 gm    There are no  conclusive data regarding the best incision type for the obese patient undergoing  delivery. Pfannensteil incision with  retraction of the panniculus cepahalad has been advocated, but may be associated with vena caval compression and fetal heart rate changes.  Patient discomfort and inability to ventilate the patient may limit the retraction of the panniculus. Vertical midline incision may be considered  but is associated with a higher rate of wound breakdown and need for classical uterine incision. The incision choice should be based on  individual patient anatomy and assessment in a flat position and the comfort of the surgeon. Regardless of incision placement, the space under  the panniculus should be prepped prior to starting the procedure.    The subcutaneous dead space should be closed with a running or interrupted suture (e.g. vicryl) for all patients in whom this layer measures >2  cm. There is no benefit (and even harm) to placement of a surgical drain in the subcutaneous layer.    Pneumatic sequential compression stockings is recommended in obese patients undergoing induction and should be placed prior to surgery  for those having a . Early ambulation should be encouraged in the postpartum period and the pneumatic devices should be used in the  postpartum period for thromboembolism prophylaxis at least until post-operative day 2 and until the patient is regularly ambulating.    5. Nonspecific antibody: The patient reports that she has had multiple blood transfusions due to menorrhagia in the past. We discussed that  antibodies can develop due to blood transfusion or in cases of isoimmunization. I would recommend repeating her type and screen at her next  visit and having the blood bank titer the antibody if able. I would also recommend following serial antibody screens depending on the blood bank  recommendations.    6. History of Chlamydia: SHERICE was negative. Recommend repeat STI testing in  the 3rd trimester. Patient should be counseled about increased  risks with STI.    7. Genetic screening: The patient is aware of the options of genetic screening and diagnostic testing for aneuploidy. She reports that she is  planning the quad screen after discussion with Dr. Lamb.    8. Potential effects of Lisinopril, particularly if there is exposure after the first trimester were discussed with the patient. Potential first trimester  exposure risks were reviewed.    9. Consider iron studies due to low MCV. She had a normal Hgb electrophoresis previously. She had negative VWF studies in 2016.      A total of 30 minutes was spent in face to face time with greater than half of that time spent in counseling and coordination of care.                  Impression  =========    Phillip live intrauterine pregnancy.  Limited ultrasound for FHT only. Cardiac activity was present.    Recommendation  ==============    Follow up ultrasound in 4-5 weeks for fetal anatomic survey.  Fetal echo at 22-24 weeks due to obesity/lisinopril exposure.

## 2017-11-09 ENCOUNTER — TELEPHONE (OUTPATIENT)
Dept: PEDIATRIC CARDIOLOGY | Facility: CLINIC | Age: 27
End: 2017-11-09

## 2017-11-09 ENCOUNTER — DOCUMENTATION ONLY (OUTPATIENT)
Dept: MATERNAL FETAL MEDICINE | Facility: OTHER | Age: 27
End: 2017-11-09

## 2017-11-09 NOTE — PROGRESS NOTES
Per Dr. Melgar with respect to non specific antibody;    What we are probably dealing with here is a serologic interference that our automated machine is picking up.  In other words, this so called antibody of undetermined specificity has nothing to do with red cells but patient's serologic reaction with the sold-phase substance in our machines.  Nevertheless, we cannot be 100% certain that this is always the case.  Therefore, I would recommend repeating the screen every 4-8 weeks during her 2nd and 3rd trimester to monitor any changes.     Dr. Lamb included on correspondence and will order labs.

## 2017-11-09 NOTE — TELEPHONE ENCOUNTER
LM for patient to call and schedule fetal echo. Office number verified to call back. Informed to schedule around end of December/beginning of January.

## 2017-12-04 ENCOUNTER — PATIENT MESSAGE (OUTPATIENT)
Dept: OBSTETRICS AND GYNECOLOGY | Facility: CLINIC | Age: 27
End: 2017-12-04

## 2017-12-05 ENCOUNTER — PATIENT MESSAGE (OUTPATIENT)
Dept: OBSTETRICS AND GYNECOLOGY | Facility: CLINIC | Age: 27
End: 2017-12-05

## 2017-12-06 ENCOUNTER — ROUTINE PRENATAL (OUTPATIENT)
Dept: OBSTETRICS AND GYNECOLOGY | Facility: CLINIC | Age: 27
End: 2017-12-06
Payer: MEDICAID

## 2017-12-06 VITALS — SYSTOLIC BLOOD PRESSURE: 134 MMHG | DIASTOLIC BLOOD PRESSURE: 100 MMHG | WEIGHT: 293 LBS | BODY MASS INDEX: 42.32 KG/M2

## 2017-12-06 DIAGNOSIS — O16.2 HYPERTENSION AFFECTING PREGNANCY IN SECOND TRIMESTER: ICD-10-CM

## 2017-12-06 DIAGNOSIS — Z34.82 ENCOUNTER FOR SUPERVISION OF OTHER NORMAL PREGNANCY IN SECOND TRIMESTER: Primary | ICD-10-CM

## 2017-12-06 PROCEDURE — 99999 PR PBB SHADOW E&M-EST. PATIENT-LVL II: CPT | Mod: PBBFAC,,,

## 2017-12-06 PROCEDURE — 99212 OFFICE O/P EST SF 10 MIN: CPT | Mod: PBBFAC

## 2017-12-06 PROCEDURE — 99213 OFFICE O/P EST LOW 20 MIN: CPT | Mod: TH,S$PBB,, | Performed by: ADVANCED PRACTICE MIDWIFE

## 2017-12-06 NOTE — PROGRESS NOTES
Pt in for eval sec to episode of light pink vaginal spotting yesterday. Pt reports spotting has resolved. Pt denies cramping, LOF or bleeding. Pt reports that she thinks has felt some mvmt but not everyday. Pos FHTs today. Spec exam with no evidence of vaginal spotting or bleeding, cervix visually closed. Discussed fetal mvmt in early pregnancy. Pt has rohith scan scheduled and advised follow up routine OB visit in 4 weeks.

## 2017-12-11 ENCOUNTER — OFFICE VISIT (OUTPATIENT)
Dept: MATERNAL FETAL MEDICINE | Facility: CLINIC | Age: 27
End: 2017-12-11
Attending: OBSTETRICS & GYNECOLOGY
Payer: MEDICAID

## 2017-12-11 DIAGNOSIS — Z36.89 ENCOUNTER FOR ULTRASOUND TO CHECK FETAL GROWTH: Primary | ICD-10-CM

## 2017-12-11 DIAGNOSIS — Z3A.13 13 WEEKS GESTATION OF PREGNANCY: ICD-10-CM

## 2017-12-11 DIAGNOSIS — I10 CHRONIC HYPERTENSION: ICD-10-CM

## 2017-12-11 PROCEDURE — 76811 OB US DETAILED SNGL FETUS: CPT | Mod: 26,S$PBB,, | Performed by: OBSTETRICS & GYNECOLOGY

## 2017-12-11 PROCEDURE — 99499 UNLISTED E&M SERVICE: CPT | Mod: S$PBB,,, | Performed by: OBSTETRICS & GYNECOLOGY

## 2017-12-11 PROCEDURE — 76811 OB US DETAILED SNGL FETUS: CPT | Mod: PBBFAC | Performed by: OBSTETRICS & GYNECOLOGY

## 2017-12-11 PROCEDURE — 99999 PR PBB SHADOW E&M-EST. PATIENT-LVL I: CPT | Mod: PBBFAC,,, | Performed by: OBSTETRICS & GYNECOLOGY

## 2017-12-11 PROCEDURE — 99211 OFF/OP EST MAY X REQ PHY/QHP: CPT | Mod: PBBFAC | Performed by: OBSTETRICS & GYNECOLOGY

## 2017-12-11 NOTE — PROGRESS NOTES
See ultrasound report for details    A detailed fetal anatomic ultrasound examination was performed for the following high risk indication: Morbid Obesity; Chronic Hypertension  No fetal structural malformations are identified; however, fetal imaging is incomplete today. A follow-up study will be scheduled to complete the fetal anatomic survey. Fetal size today is consistent with established gestational age. Cervical length is normal. Placental location is normal without evidence of previa.

## 2017-12-11 NOTE — LETTER
December 11, 2017      Nancy Lamb MD  [address]  [address]  [facility] [address]           [facility]  [address]  [facility] [address]  Phone: [phone]          Patient: Kaylie Fuentes   MR Number: [id number]   YOB: 1990   Date of Visit: 12/11/2017       Dear Dr. Nancy Lamb:    Thank you for referring Kaylie Fuentes to me for evaluation. Attached you will find relevant portions of my assessment and plan of care.    If you have questions, please do not hesitate to call me. I look forward to following Kaylie Fuentes along with you.    Sincerely,    Gucci Killian III, MD    Enclosure  CC:  No Recipients    If you would like to receive this communication electronically, please contact [email or web address] or [phone] to request more information on [employer] Link access.    For providers and/or their staff who would like to refer a patient to [facility], please contact us through our one-stop-shop provider referral line, [facility], at [phone].    If you feel you have received this communication in error or would no longer like to receive these types of communications, please e-mail [email or web address]

## 2017-12-21 ENCOUNTER — CLINICAL SUPPORT (OUTPATIENT)
Dept: PEDIATRIC CARDIOLOGY | Facility: CLINIC | Age: 27
End: 2017-12-21
Payer: MEDICAID

## 2017-12-21 ENCOUNTER — OFFICE VISIT (OUTPATIENT)
Dept: PEDIATRIC CARDIOLOGY | Facility: CLINIC | Age: 27
End: 2017-12-21
Payer: MEDICAID

## 2017-12-21 VITALS — OXYGEN SATURATION: 100 % | WEIGHT: 293 LBS | HEIGHT: 68 IN | BODY MASS INDEX: 44.41 KG/M2

## 2017-12-21 DIAGNOSIS — E28.2 PCOS (POLYCYSTIC OVARIAN SYNDROME): ICD-10-CM

## 2017-12-21 DIAGNOSIS — O16.2 HYPERTENSION AFFECTING PREGNANCY IN SECOND TRIMESTER: ICD-10-CM

## 2017-12-21 DIAGNOSIS — O16.2 HYPERTENSION AFFECTING PREGNANCY IN SECOND TRIMESTER: Primary | ICD-10-CM

## 2017-12-21 PROCEDURE — 76825 ECHO EXAM OF FETAL HEART: CPT | Mod: PBBFAC | Performed by: PEDIATRICS

## 2017-12-21 PROCEDURE — 76827 ECHO EXAM OF FETAL HEART: CPT | Mod: PBBFAC | Performed by: PEDIATRICS

## 2017-12-21 PROCEDURE — 99212 OFFICE O/P EST SF 10 MIN: CPT | Mod: PBBFAC | Performed by: PEDIATRICS

## 2017-12-21 PROCEDURE — 93325 DOPPLER ECHO COLOR FLOW MAPG: CPT | Mod: PBBFAC | Performed by: PEDIATRICS

## 2017-12-21 PROCEDURE — 76827 ECHO EXAM OF FETAL HEART: CPT | Mod: 26,S$PBB,, | Performed by: PEDIATRICS

## 2017-12-21 PROCEDURE — 99203 OFFICE O/P NEW LOW 30 MIN: CPT | Mod: 25,S$PBB,, | Performed by: PEDIATRICS

## 2017-12-21 PROCEDURE — 76825 ECHO EXAM OF FETAL HEART: CPT | Mod: 26,S$PBB,, | Performed by: PEDIATRICS

## 2017-12-21 PROCEDURE — 93325 DOPPLER ECHO COLOR FLOW MAPG: CPT | Mod: 26,S$PBB,, | Performed by: PEDIATRICS

## 2017-12-21 PROCEDURE — 99999 PR PBB SHADOW E&M-EST. PATIENT-LVL II: CPT | Mod: PBBFAC,,, | Performed by: PEDIATRICS

## 2017-12-21 NOTE — LETTER
December 21, 2017                   [facility]  Pediatric Cardiology  [address]  [facility] [address]  Phone: [phone]  Fax: [phone]   December 21, 2017     Patient: Kaylie Fuentes   YOB: 1990   Date of Visit: 12/21/2017       To Whom it May Concern:    Kaylie Fuentes was seen in my clinic on 12/21/2017. She may return to work on 12/21/2017.    If you have any questions or concerns, please don't hesitate to call.    Sincerely,         Lisa Maldonado MA

## 2017-12-22 ENCOUNTER — PATIENT MESSAGE (OUTPATIENT)
Dept: PEDIATRIC CARDIOLOGY | Facility: CLINIC | Age: 27
End: 2017-12-22

## 2017-12-22 ENCOUNTER — PATIENT MESSAGE (OUTPATIENT)
Dept: OBSTETRICS AND GYNECOLOGY | Facility: CLINIC | Age: 27
End: 2017-12-22

## 2017-12-22 NOTE — PROGRESS NOTES
"Ms. Fuentes  is a 27 y.o. year old  , referred by Dr. Rogers because of the history of chronic hypertension.    The patient presented at approximately 21 4/7 weeks gestation (date of last menstrual period Patient's last menstrual period was 2017.).  The patient denied any complaints.    Past medical history: Significant for CHTN, PCOS and obesity  Past surgical history: S/P C/S x 1.  Past gestational history: The patient has a healthy son (7 y/o).    Family history: Negative for congenital heart disease, and sudden death during childhood.      Medications:   Outpatient Encounter Prescriptions as of 2017   Medication Sig Dispense Refill    labetalol (NORMODYNE) 200 MG tablet Take 2 tablets (400 mg total) by mouth 2 (two) times daily. 120 tablet 11    nifedipine (PROCARDIA-XL) 30 MG (OSM) 24 hr tablet Take 1 tablet (30 mg total) by mouth once daily. 30 tablet 11    PRENATAL VIT/IRON FUM/FOLIC AC (PRENATAL 1+1 ORAL) Take by mouth.       No facility-administered encounter medications on file as of 2017.        Allergies: Patient has no known allergies.    Height 5' 7.72" (1.72 m), weight 134.6 kg (296 lb 13.6 oz), last menstrual period 2017, SpO2 100 %.    Fetal echocardiogram was technically difficult due to patient size and fetal position.  A four chamber fetal heart with situs solitus was seen.  The ventricles appeared to be equal in size.  The contractility of both ventricles was good.  The fetal heart rate was within the normal range, and regular.  The interventricular septum appeared to be intact.  There were normally related great arteries seen.  The ductal and aortic arch were visualized, and appeared to be widely patent.  There was no pleural or pericardial effusion seen.    Doppler analysis revealed a three vessel umbilical cord, with normal flow patterns, and velocities by Doppler.  There was a normal flow pattern seen in the ductus venosus.  There was evidence of normal " systemic, and pulmonary venous return seen.  There were normal inflow patterns seen across the AV-valves, without significant insufficiency.  There was no ventricular level shunt seen.  The right and left ventricular outflow tract, and ductal and aortic arch appeared to be unobstructed.    Impression:  Although this was a technically difficult study, it is our impression that Ms. Fuentes had a normal fetal echocardiogram.  As you know, small defects, and coarctation of the aorta cannot always be ruled out on fetal echocardiogram.  We discussed our findings with the patient, reviewed our images, and answered her questions. We also discussed the limitations of fetal echocardiography, but emphasized that her child appears to have normal cardiac anatomy.   The patient had hypertension today (169/97 mm HG) and she mentioned that she did not take her medications for several days due to the fact that she was out of town and forgot to bring her medication.  She resumed her medication this morning.  We discussed the patient briefly with Dr. Lamb, who stated that she should continue her medication as prescribed with BP goal of at most 160/100.  The patient should go to the OB triage clinic at [facility] for complaints of headaches, dizziness, or other.  We relayed this information to the patient.  No further follow up is scheduled in our clinic, but, of course, we will always be available to reevaluate this patient, if needed.  Time spent: 30 minutes, 50% dedicated to counseling.

## 2018-01-07 ENCOUNTER — PATIENT MESSAGE (OUTPATIENT)
Dept: OBSTETRICS AND GYNECOLOGY | Facility: CLINIC | Age: 28
End: 2018-01-07

## 2018-01-08 ENCOUNTER — HOSPITAL ENCOUNTER (EMERGENCY)
Facility: OTHER | Age: 28
Discharge: HOME | End: 2018-01-08
Attending: OBSTETRICS & GYNECOLOGY
Payer: MEDICAID

## 2018-01-08 VITALS
SYSTOLIC BLOOD PRESSURE: 147 MMHG | TEMPERATURE: 97 F | OXYGEN SATURATION: 98 % | HEART RATE: 74 BPM | DIASTOLIC BLOOD PRESSURE: 76 MMHG | RESPIRATION RATE: 16 BRPM

## 2018-01-08 DIAGNOSIS — O26.899 ABDOMINAL CRAMPING AFFECTING PREGNANCY: ICD-10-CM

## 2018-01-08 DIAGNOSIS — O10.912 CHRONIC HYPERTENSION COMPLICATING OR REASON FOR CARE DURING PREGNANCY, SECOND TRIMESTER: ICD-10-CM

## 2018-01-08 DIAGNOSIS — R10.9 ABDOMINAL CRAMPING AFFECTING PREGNANCY: ICD-10-CM
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
## 2018-01-08 DIAGNOSIS — Z3A.24 24 WEEKS GESTATION OF PREGNANCY: Primary | ICD-10-CM

## 2018-01-08 LAB
ALBUMIN SERPL BCP-MCNC: 2.8 G/DL
ALP SERPL-CCNC: 79 U/L
ALT SERPL W/O P-5'-P-CCNC: 14 U/L
ANION GAP SERPL CALC-SCNC: 9 MMOL/L
AST SERPL-CCNC: 11 U/L
BASOPHILS # BLD AUTO: 0.02 K/UL
BASOPHILS NFR BLD: 0.2 %
BILIRUB SERPL-MCNC: 0.2 MG/DL
BILIRUB SERPL-MCNC: NORMAL MG/DL
BLOOD URINE, POC: NORMAL
BUN SERPL-MCNC: 10 MG/DL
CALCIUM SERPL-MCNC: 9.3 MG/DL
CHLORIDE SERPL-SCNC: 105 MMOL/L
CO2 SERPL-SCNC: 22 MMOL/L
COLOR, POC UA: NORMAL
CREAT SERPL-MCNC: 0.8 MG/DL
CREAT UR-MCNC: 165 MG/DL
DIFFERENTIAL METHOD: ABNORMAL
EOSINOPHIL # BLD AUTO: 0.2 K/UL
EOSINOPHIL NFR BLD: 1.6 %
ERYTHROCYTE [DISTWIDTH] IN BLOOD BY AUTOMATED COUNT: 14.6 %
EST. GFR  (AFRICAN AMERICAN): >60 ML/MIN/1.73 M^2
EST. GFR  (NON AFRICAN AMERICAN): >60 ML/MIN/1.73 M^2
GLUCOSE SERPL-MCNC: 98 MG/DL
GLUCOSE UR QL STRIP: NORMAL
HCT VFR BLD AUTO: 36.8 %
HGB BLD-MCNC: 12 G/DL
KETONES UR QL STRIP: NORMAL
LEUKOCYTE ESTERASE URINE, POC: NORMAL
LYMPHOCYTES # BLD AUTO: 2.2 K/UL
LYMPHOCYTES NFR BLD: 18.3 %
MCH RBC QN AUTO: 27.1 PG
MCHC RBC AUTO-ENTMCNC: 32.6 G/DL
MCV RBC AUTO: 83 FL
MONOCYTES # BLD AUTO: 0.8 K/UL
MONOCYTES NFR BLD: 6.9 %
NEUTROPHILS # BLD AUTO: 8.8 K/UL
NEUTROPHILS NFR BLD: 72.7 %
NITRITE, POC UA: NORMAL
PH, POC UA: NORMAL
PLATELET # BLD AUTO: 251 K/UL
PMV BLD AUTO: 11.4 FL
POTASSIUM SERPL-SCNC: 3.9 MMOL/L
PROT SERPL-MCNC: 6.8 G/DL
PROT UR-MCNC: 22 MG/DL
PROT/CREAT RATIO, UR: 0.13
PROTEIN, POC: NORMAL
RBC # BLD AUTO: 4.43 M/UL
SODIUM SERPL-SCNC: 136 MMOL/L
SPECIFIC GRAVITY, POC UA: NORMAL
UROBILINOGEN, POC UA: NORMAL
WBC # BLD AUTO: 12.16 K/UL

## 2018-01-08 PROCEDURE — 99284 EMERGENCY DEPT VISIT MOD MDM: CPT | Mod: 25

## 2018-01-08 PROCEDURE — 59025 FETAL NON-STRESS TEST: CPT

## 2018-01-08 PROCEDURE — 25000003 PHARM REV CODE 250: Performed by: STUDENT IN AN ORGANIZED HEALTH CARE EDUCATION/TRAINING PROGRAM

## 2018-01-08 PROCEDURE — 59025 FETAL NON-STRESS TEST: CPT | Mod: 26,,, | Performed by: OBSTETRICS & GYNECOLOGY

## 2018-01-08 PROCEDURE — 85025 COMPLETE CBC W/AUTO DIFF WBC: CPT

## 2018-01-08 PROCEDURE — 80053 COMPREHEN METABOLIC PANEL: CPT

## 2018-01-08 PROCEDURE — 81002 URINALYSIS NONAUTO W/O SCOPE: CPT

## 2018-01-08 PROCEDURE — 84156 ASSAY OF PROTEIN URINE: CPT

## 2018-01-08 PROCEDURE — 99284 EMERGENCY DEPT VISIT MOD MDM: CPT | Mod: 25,,, | Performed by: OBSTETRICS & GYNECOLOGY

## 2018-01-08 RX ORDER — ACETAMINOPHEN 500 MG
1000 TABLET ORAL ONCE
Status: COMPLETED | OUTPATIENT
Start: 2018-01-08 | End: 2018-01-08

## 2018-01-08 RX ORDER — LABETALOL 200 MG/1
400 TABLET, FILM COATED ORAL ONCE
Status: COMPLETED | OUTPATIENT
Start: 2018-01-08 | End: 2018-01-08

## 2018-01-08 RX ORDER — NIFEDIPINE 30 MG/1
30 TABLET, EXTENDED RELEASE ORAL ONCE
Status: COMPLETED | OUTPATIENT
Start: 2018-01-08 | End: 2018-01-08

## 2018-01-08 RX ADMIN — NIFEDIPINE 30 MG: 30 TABLET, FILM COATED, EXTENDED RELEASE ORAL at 12:01

## 2018-01-08 RX ADMIN — LABETALOL HYDROCHLORIDE 400 MG: 200 TABLET, FILM COATED ORAL at 12:01

## 2018-01-08 RX ADMIN — ACETAMINOPHEN 1000 MG: 500 TABLET ORAL at 11:01
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
## 2018-01-11 ENCOUNTER — OFFICE VISIT (OUTPATIENT)
Dept: MATERNAL FETAL MEDICINE | Facility: CLINIC | Age: 28
End: 2018-01-11
Payer: MEDICAID

## 2018-01-11 DIAGNOSIS — Z36.89 ENCOUNTER FOR ULTRASOUND TO CHECK FETAL GROWTH: ICD-10-CM

## 2018-01-11 PROCEDURE — 99499 UNLISTED E&M SERVICE: CPT | Mod: S$PBB,,, | Performed by: OBSTETRICS & GYNECOLOGY

## 2018-01-11 PROCEDURE — 76816 OB US FOLLOW-UP PER FETUS: CPT | Mod: PBBFAC | Performed by: OBSTETRICS & GYNECOLOGY

## 2018-01-11 PROCEDURE — 76816 OB US FOLLOW-UP PER FETUS: CPT | Mod: 26,S$PBB,, | Performed by: OBSTETRICS & GYNECOLOGY

## 2018-01-11 NOTE — LETTER
January 11, 2018      Nancy Lamb MD  [address]  [address]  [facility] [address]           [facility]  [address]  [facility] [address]  Phone: [phone]          Patient: Kaylie Fuentes   MR Number: [id number]   YOB: 1990   Date of Visit: 1/11/2018       Dear Dr. Nancy Lamb:    Thank you for referring Kaylie Fuentes to me for evaluation. Attached you will find relevant portions of my assessment and plan of care.    If you have questions, please do not hesitate to call me. I look forward to following Kaylie Fuentes along with you.    Sincerely,    Acacia Tesfaye MD    Enclosure  CC:  No Recipients    If you would like to receive this communication electronically, please contact [email or web address] or [phone] to request more information on infirst Healthcare Link access.    For providers and/or their staff who would like to refer a patient to [facility], please contact us through our one-stop-shop provider referral line, [facility], at [phone].    If you feel you have received this communication in error or would no longer like to receive these types of communications, please e-mail [email or web address]

## 2018-01-20 ENCOUNTER — PATIENT MESSAGE (OUTPATIENT)
Dept: OBSTETRICS AND GYNECOLOGY | Facility: CLINIC | Age: 28
End: 2018-01-20

## 2018-01-21 ENCOUNTER — PATIENT MESSAGE (OUTPATIENT)
Dept: OBSTETRICS AND GYNECOLOGY | Facility: CLINIC | Age: 28
End: 2018-01-21

## 2018-01-22 ENCOUNTER — PATIENT MESSAGE (OUTPATIENT)
Dept: OBSTETRICS AND GYNECOLOGY | Facility: CLINIC | Age: 28
End: 2018-01-22

## 2018-01-22 NOTE — TELEPHONE ENCOUNTER
Sent patient message within separate encounter:    Good Afternoon Dr. Adonis Lott is out this afternoon. If I understand you correctly, you have been feeling faint and dizzy, experiencing flu symptoms, and not getting relief from tylenol. Is baby moving ok? Are you currently experiencing any nausea, vomiting, fever, chills, or vaginal leakage/bleeding?   If you are experiencing any changes in your vision, persistent head ache, or pain under your right breast then you need to go to the OB ED.

## 2018-01-24 ENCOUNTER — HOSPITAL ENCOUNTER (OUTPATIENT)
Facility: OTHER | Age: 28
Discharge: HOME | End: 2018-01-24
Attending: OBSTETRICS & GYNECOLOGY
Payer: MEDICAID

## 2018-01-24 ENCOUNTER — ROUTINE PRENATAL (OUTPATIENT)
Dept: OBSTETRICS AND GYNECOLOGY | Facility: CLINIC | Age: 28
End: 2018-01-24
Payer: MEDICAID

## 2018-01-24 VITALS
OXYGEN SATURATION: 96 % | HEART RATE: 100 BPM | SYSTOLIC BLOOD PRESSURE: 125 MMHG | DIASTOLIC BLOOD PRESSURE: 72 MMHG | RESPIRATION RATE: 18 BRPM | TEMPERATURE: 98 F

## 2018-01-24 VITALS — SYSTOLIC BLOOD PRESSURE: 142 MMHG | DIASTOLIC BLOOD PRESSURE: 100 MMHG

## 2018-01-24 DIAGNOSIS — R74.01 TRANSAMINITIS: ICD-10-CM

## 2018-01-24 DIAGNOSIS — Z34.90 PREGNANCY: ICD-10-CM

## 2018-01-24 DIAGNOSIS — O10.919 HTN IN PREGNANCY, CHRONIC: ICD-10-CM

## 2018-01-24 DIAGNOSIS — I10 CHRONIC HYPERTENSION: ICD-10-CM

## 2018-01-24 DIAGNOSIS — J11.1 FLU: Primary | ICD-10-CM

## 2018-01-24 DIAGNOSIS — Z3A.26 26 WEEKS GESTATION OF PREGNANCY: Primary | ICD-10-CM

## 2018-01-24 DIAGNOSIS — R11.2 NON-INTRACTABLE VOMITING WITH NAUSEA, UNSPECIFIED VOMITING TYPE: ICD-10-CM

## 2018-01-24 PROBLEM — J10.1 INFLUENZA B: Status: ACTIVE | Noted: 2018-01-24

## 2018-01-24 PROBLEM — R74.8 ELEVATED LIVER ENZYMES: Status: ACTIVE | Noted: 2018-01-24

## 2018-01-24 LAB
ALBUMIN SERPL BCP-MCNC: 2.9 G/DL
ALP SERPL-CCNC: 102 U/L
ALT SERPL W/O P-5'-P-CCNC: 106 U/L
AMYLASE SERPL-CCNC: 41 U/L
ANION GAP SERPL CALC-SCNC: 7 MMOL/L
AST SERPL-CCNC: 65 U/L
BASOPHILS # BLD AUTO: 0.01 K/UL
BASOPHILS NFR BLD: 0.1 %
BILIRUB SERPL-MCNC: 0.2 MG/DL
BUN SERPL-MCNC: 9 MG/DL
CALCIUM SERPL-MCNC: 8.6 MG/DL
CHLORIDE SERPL-SCNC: 104 MMOL/L
CO2 SERPL-SCNC: 25 MMOL/L
CREAT SERPL-MCNC: 0.9 MG/DL
CREAT UR-MCNC: 247.5 MG/DL
DIFFERENTIAL METHOD: ABNORMAL
EOSINOPHIL # BLD AUTO: 0 K/UL
EOSINOPHIL NFR BLD: 0.5 %
ERYTHROCYTE [DISTWIDTH] IN BLOOD BY AUTOMATED COUNT: 14.6 %
EST. GFR  (AFRICAN AMERICAN): >60 ML/MIN/1.73 M^2
EST. GFR  (NON AFRICAN AMERICAN): >60 ML/MIN/1.73 M^2
GLUCOSE SERPL-MCNC: 138 MG/DL
HCT VFR BLD AUTO: 40.2 %
HGB BLD-MCNC: 13.1 G/DL
LIPASE SERPL-CCNC: 17 U/L
LYMPHOCYTES # BLD AUTO: 1.8 K/UL
LYMPHOCYTES NFR BLD: 23.3 %
MCH RBC QN AUTO: 27.2 PG
MCHC RBC AUTO-ENTMCNC: 32.6 G/DL
MCV RBC AUTO: 84 FL
MONOCYTES # BLD AUTO: 0.5 K/UL
MONOCYTES NFR BLD: 6.8 %
NEUTROPHILS # BLD AUTO: 5.3 K/UL
NEUTROPHILS NFR BLD: 68.9 %
PLATELET # BLD AUTO: 241 K/UL
PMV BLD AUTO: 11.5 FL
POTASSIUM SERPL-SCNC: 3.9 MMOL/L
PROT SERPL-MCNC: 6.7 G/DL
PROT UR-MCNC: 29 MG/DL
PROT/CREAT RATIO, UR: 0.12
RBC # BLD AUTO: 4.81 M/UL
SODIUM SERPL-SCNC: 136 MMOL/L
WBC # BLD AUTO: 7.63 K/UL

## 2018-01-24 PROCEDURE — 76818 FETAL BIOPHYS PROFILE W/NST: CPT | Mod: 26,,, | Performed by: OBSTETRICS & GYNECOLOGY

## 2018-01-24 PROCEDURE — 99213 OFFICE O/P EST LOW 20 MIN: CPT | Mod: TH,S$PBB,, | Performed by: OBSTETRICS & GYNECOLOGY

## 2018-01-24 PROCEDURE — 85025 COMPLETE CBC W/AUTO DIFF WBC: CPT

## 2018-01-24 PROCEDURE — 76818 FETAL BIOPHYS PROFILE W/NST: CPT

## 2018-01-24 PROCEDURE — 99212 OFFICE O/P EST SF 10 MIN: CPT | Mod: PBBFAC,25 | Performed by: OBSTETRICS & GYNECOLOGY

## 2018-01-24 PROCEDURE — 83690 ASSAY OF LIPASE: CPT

## 2018-01-24 PROCEDURE — 80053 COMPREHEN METABOLIC PANEL: CPT

## 2018-01-24 PROCEDURE — 80074 ACUTE HEPATITIS PANEL: CPT

## 2018-01-24 PROCEDURE — 99999 PR PBB SHADOW E&M-EST. PATIENT-LVL II: CPT | Mod: PBBFAC,,, | Performed by: OBSTETRICS & GYNECOLOGY

## 2018-01-24 PROCEDURE — 99284 EMERGENCY DEPT VISIT MOD MDM: CPT | Mod: 25,,, | Performed by: OBSTETRICS & GYNECOLOGY

## 2018-01-24 PROCEDURE — 82150 ASSAY OF AMYLASE: CPT

## 2018-01-24 PROCEDURE — 99284 EMERGENCY DEPT VISIT MOD MDM: CPT | Mod: 25,27

## 2018-01-24 PROCEDURE — 82570 ASSAY OF URINE CREATININE: CPT

## 2018-01-24 RX ORDER — SIMETHICONE 80 MG
1 TABLET,CHEWABLE ORAL EVERY 6 HOURS PRN
Status: DISCONTINUED | OUTPATIENT
Start: 2018-01-24 | End: 2018-01-24 | Stop reason: HOSPADM

## 2018-01-24 RX ORDER — OSELTAMIVIR PHOSPHATE 30 MG/1
75 CAPSULE ORAL 2 TIMES DAILY
COMMUNITY
End: 2018-03-14

## 2018-01-24 RX ORDER — DIPHENHYDRAMINE HCL 25 MG
25 CAPSULE ORAL EVERY 4 HOURS PRN
Status: DISCONTINUED | OUTPATIENT
Start: 2018-01-24 | End: 2018-01-24 | Stop reason: HOSPADM

## 2018-01-24 RX ORDER — ONDANSETRON 8 MG/1
8 TABLET, ORALLY DISINTEGRATING ORAL EVERY 8 HOURS PRN
Status: DISCONTINUED | OUTPATIENT
Start: 2018-01-24 | End: 2018-01-24 | Stop reason: HOSPADM

## 2018-01-24 RX ORDER — PRENATAL WITH FERROUS FUM AND FOLIC ACID 3080; 920; 120; 400; 22; 1.84; 3; 20; 10; 1; 12; 200; 27; 25; 2 [IU]/1; [IU]/1; MG/1; [IU]/1; MG/1; MG/1; MG/1; MG/1; MG/1; MG/1; UG/1; MG/1; MG/1; MG/1; MG/1
1 TABLET ORAL DAILY
Status: DISCONTINUED | OUTPATIENT
Start: 2018-01-25 | End: 2018-01-24 | Stop reason: HOSPADM

## 2018-01-24 RX ORDER — AMOXICILLIN 250 MG
1 CAPSULE ORAL NIGHTLY PRN
Status: DISCONTINUED | OUTPATIENT
Start: 2018-01-24 | End: 2018-01-24 | Stop reason: HOSPADM

## 2018-01-24 RX ORDER — NIFEDIPINE 30 MG/1
30 TABLET, EXTENDED RELEASE ORAL DAILY
Status: DISCONTINUED | OUTPATIENT
Start: 2018-01-25 | End: 2018-01-24 | Stop reason: HOSPADM

## 2018-01-24 RX ORDER — DIPHENHYDRAMINE HYDROCHLORIDE 50 MG/ML
25 INJECTION INTRAMUSCULAR; INTRAVENOUS EVERY 4 HOURS PRN
Status: DISCONTINUED | OUTPATIENT
Start: 2018-01-24 | End: 2018-01-24 | Stop reason: HOSPADM

## 2018-01-24 RX ORDER — GUAIFENESIN 100 MG/5ML
200 SOLUTION ORAL 3 TIMES DAILY PRN
COMMUNITY
End: 2018-03-14

## 2018-01-24 RX ORDER — PROMETHAZINE HYDROCHLORIDE 6.25 MG/5ML
25 SYRUP ORAL EVERY 6 HOURS PRN
COMMUNITY
End: 2018-03-14

## 2018-01-24 RX ORDER — PROMETHAZINE HYDROCHLORIDE 25 MG/1
25 TABLET ORAL EVERY 6 HOURS PRN
Status: DISCONTINUED | OUTPATIENT
Start: 2018-01-24 | End: 2018-01-24 | Stop reason: HOSPADM

## 2018-01-24 RX ORDER — OSELTAMIVIR PHOSPHATE 75 MG/1
75 CAPSULE ORAL 2 TIMES DAILY
Status: DISCONTINUED | OUTPATIENT
Start: 2018-01-24 | End: 2018-01-24 | Stop reason: HOSPADM

## 2018-01-24 RX ORDER — LABETALOL 200 MG/1
400 TABLET, FILM COATED ORAL EVERY 12 HOURS
Status: DISCONTINUED | OUTPATIENT
Start: 2018-01-24 | End: 2018-01-24 | Stop reason: HOSPADM

## 2018-01-24 RX ORDER — BENZONATATE 100 MG/1
100 CAPSULE ORAL 3 TIMES DAILY PRN
Status: DISCONTINUED | OUTPATIENT
Start: 2018-01-24 | End: 2018-01-24 | Stop reason: HOSPADM

## 2018-01-24 NOTE — PROGRESS NOTES
HERE for routine OB visit at 26 3/7 wks, being treated for the flu.  She comes in for follow up. Has not come for multiple appts.  She wants to go back to work.  BP elevated, up to diastolics of 100.  HA but also has a viral sickness.  Is on tamiflu.  She is on procardia and labetalol.   UP to triage for BP series, Labs and PROT/ creat ratio.

## 2018-01-24 NOTE — ED TRIAGE NOTES
Pt arrived to OB ED from clinic with complaints of HTN.  Pt complains of HA but she is also was diagnosed with the flu Monday and says she had still be experiencing generalized weakness and fatigue.  Pt denies LOF, vaginal bleeding, ctx.  Pt reports +FM.  TOCO and EFM applied.  MD notified.

## 2018-01-24 NOTE — HPI
Kaylie Fuentes is a 27 y.o. [id number] at 26w3d presented to the [facility] following elevated blood pressures in clinic. Patient is positive for influenza B and has been on Tamiflu since Monday. She has a cough and has nausea when taking her medications. She had episode of emesis x 1 in clinic today but denies other vomiting. She has had a headache with the flu but denies current headache. She notes shortness of breath with her cough. She denies vision changes or RUQ pain.  In the [facility], patient was found to have AST/ALT of 65/106 respectively. CBC and rest of CMP were wnl. PCR was 0.12.  Patient denies contractions, denies vaginal bleeding, denies LOF.   Fetal Movement: normal.   This IUP is complicated by cHTN (on procardia 30XL and labetalol 400mg BID - she took her morning medications), obesity, and CT this pregnancy with negative SHERICE.

## 2018-01-25 ENCOUNTER — TELEPHONE (OUTPATIENT)
Dept: OBSTETRICS AND GYNECOLOGY | Facility: CLINIC | Age: 28
End: 2018-01-25

## 2018-01-25 DIAGNOSIS — I10 CHRONIC HYPERTENSION: Primary | ICD-10-CM

## 2018-01-25 LAB
HAV IGM SERPL QL IA: NEGATIVE
HBV CORE IGM SERPL QL IA: NEGATIVE
HBV SURFACE AG SERPL QL IA: NEGATIVE
HCV AB SERPL QL IA: NEGATIVE

## 2018-01-25 NOTE — ED PROVIDER NOTES
Encounter Date: 2018       History     Chief Complaint   Patient presents with    Hypertension     Kaylie Fuentes is a 27 y.o. [id number] at 26w3d presented to the [facility] following elevated blood pressures in clinic. Patient is positive for influenza B and has been on Tamiflu since Monday. She has a cough and has nausea when taking her medications. She had episode of emesis x 1 in clinic today but denies other vomiting. She has had a headache with the flu but denies current headache. She notes shortness of breath with her cough. She denies vision changes or RUQ pain.  In the [facility], patient was found to have AST/ALT of 65/106 respectively. CBC and rest of CMP were wnl. PCR was 0.12.  Patient denies contractions, denies vaginal bleeding, denies LOF.   Fetal Movement: normal.   This IUP is complicated by cHTN (on procardia 30XL and labetalol 400mg BID - she took her morning medications), obesity, and CT this pregnancy with negative SHERICE.          Review of patient's allergies indicates:  No Known Allergies  Past Medical History:   Diagnosis Date    Abnormal Pap smear of cervix     Anemia     Heavy periods     Hypertension     PCOS (polycystic ovarian syndrome)      Past Surgical History:   Procedure Laterality Date     SECTION       Family History   Problem Relation Age of Onset    Breast cancer Mother     Breast cancer Maternal Aunt     Colon cancer Neg Hx     Ovarian cancer Neg Hx      Social History   Substance Use Topics    Smoking status: Former Smoker    Smokeless tobacco: Former User      Comment: quit 7 years ago-    Alcohol use No     Review of Systems   Constitutional: Negative for chills, fatigue and fever.   HENT: Negative for congestion.    Eyes: Negative for visual disturbance.   Respiratory: Positive for cough and shortness of breath.    Cardiovascular: Negative for chest pain and palpitations.   Gastrointestinal: Positive for nausea and vomiting. Negative for abdominal distention,  abdominal pain, constipation and diarrhea.   Genitourinary: Negative for difficulty urinating, dysuria, hematuria, vaginal bleeding and vaginal discharge.   Skin: Negative for rash.   Neurological: Negative for dizziness, seizures, light-headedness and headaches.   Hematological: Does not bruise/bleed easily.   Psychiatric/Behavioral: Negative for dysphoric mood. The patient is not nervous/anxious.        Physical Exam     Initial Vitals   BP Pulse Resp Temp SpO2   18 1450 18 1455 18 1454 18 1454 18 1455   114/68 94 18 98.2 °F (36.8 °C) 97 %      MAP       18 1450       83.33         Physical Exam    Vitals reviewed.  Constitutional: She appears well-developed and well-nourished. She is not diaphoretic. No distress.   HENT:   Head: Normocephalic and atraumatic.   Cardiovascular: Normal rate, regular rhythm, normal heart sounds and intact distal pulses.   Pulmonary/Chest: Breath sounds normal. No respiratory distress. She has no wheezes. She has no rales.   Abdominal: Soft. There is no tenderness. There is no rebound and no guarding.   Obese  Gravid   Neurological: She is alert and oriented to person, place, and time. She has normal strength. No sensory deficit.   Psychiatric: She has a normal mood and affect. Her behavior is normal. Judgment and thought content normal.     OB LABOR EXAM:                       Comments: BPP 8/8, +movement, +tone, +breathing, MVP wnl, NST was not performed as the heart rate could not be monitored continuously 2/2 patient size       ED Course   Obtain Fetal nonstress test (NST)  Date/Time: 2018 7:16 PM  Performed by: SHUKRI BENNETT  Authorized by: REDDY, SUSHMA K     Nonstress Test:     Variability: Unable to keep baby on monitor due to maternal habitus and fetal movement.    Baseline:  150    Contractions:  Not present  Biophysical Profile:     Fetal Breathin    Fetal Movement:  2    Fetal Tone:  2    Fluid Volume:  2    Biophysical  Profile Score  (of 8):  8    MVP (Maximal Vertical Pocket):  2.8    Nonstress Test Interpretation: appropriate for gestational age      Overall Impression:  Reassuring      Labs Reviewed   COMPREHENSIVE METABOLIC PANEL - Abnormal; Notable for the following:        Result Value    Glucose 138 (*)     Calcium 8.6 (*)     Albumin 2.9 (*)     AST 65 (*)      (*)     Anion Gap 7 (*)     All other components within normal limits   CBC W/ AUTO DIFFERENTIAL - Abnormal; Notable for the following:     RDW 14.6 (*)     All other components within normal limits   PROTEIN / CREATININE RATIO, URINE - Abnormal; Notable for the following:     Protein, Urine Random 29 (*)     All other components within normal limits   AMYLASE   LIPASE   HEPATITIS PANEL, ACUTE   PROTEIN, URINE, TIMED   TYPE & SCREEN             Medical Decision Making:   Initial Assessment:   Flu+ with elevated BP in the mild range in a pt with chronic HTN   ED Management:  Flu+ with elevated BP in the mild range in a pt with chronic HTN and new mild elevated in LFT  PT vomited with meds this AM when she took all pills together  Has been on tamiflu since Monday    Other:   I have discussed this case with another health care provider.       <> Summary of the Discussion: Given CHTN with new LFT elevated pt was d/w MFM staff on call, Dr. Trimble  BPs reviewed, essentially at baseline with majority in the normal range in triage  LFTs elevated, but pt on tamiflu and vomiting  No change in p/c ratio  Will discharge with strict precautions given to return with any worsening of sx (pre-E/flu/PNA ROS reviewed with pt) and close OP follow up              Attending Attestation:   Physician Attestation Statement for Resident:  As the supervising MD   Physician Attestation Statement: I have personally seen and examined this patient.   I agree with the above history. -:   As the supervising MD I agree with the above PE.    As the supervising MD I agree with the above  treatment, course, plan, and disposition.   -: My plan was to admit patient for observation and repeat LFTs because I suspect they are due to N/V and dehydration and not due to PreE.   Okay'ed by M (dr Trimble) to be WI'ed home  I was personally present during the entire procedure.  I have reviewed and agree with the residents interpretation of the following: lab data.  I have reviewed the following: old records at this facility.                    ED Course      Clinical Impression:   The primary encounter diagnosis was Flu. Diagnoses of Pregnancy, Non-intractable vomiting with nausea, unspecified vomiting type, and HTN in pregnancy, chronic were also pertinent to this visit.                           Shelly Marx MD  Resident  01/24/18 1906       Verenice Metcalf MD  01/24/18 1926

## 2018-01-25 NOTE — H&P
[facility]  Obstetrics  History & Physical    Patient Name: Kaylie Fuentes  MRN: [id number]  Admission Date: 2018  Primary Care Provider: Primary Doctor No    Subjective:     Principal Problem:Elevated liver enzymes    History of Present Illness:  Kaylie Fuentes is a 27 y.o. [id number] at 26w3d presented to the [facility] following elevated blood pressures in clinic. Patient is positive for influenza B and has been on Tamiflu since Monday. She has a cough and has nausea when taking her medications. She had episode of emesis x 1 in clinic today but denies other vomiting. She has had a headache with the flu but denies current headache. She notes shortness of breath with her cough. She denies vision changes or RUQ pain.  In the [facility], patient was found to have AST/ALT of 65/106 respectively. CBC and rest of CMP were wnl. PCR was 0.12.  Patient denies contractions, denies vaginal bleeding, denies LOF.   Fetal Movement: normal.   This IUP is complicated by cHTN (on procardia 30XL and labetalol 400mg BID - she took her morning medications), obesity, and CT this pregnancy with negative SHERICE.      Obstetric History       T1      L1     SAB0   TAB0   Ectopic0   Multiple0   Live Births1       # Outcome Date GA Lbr Aiden/2nd Weight Sex Delivery Anes PTL Lv   2 Current            1 Term 11 39w0d  2.722 kg (6 lb) M CS-LTranv  N KALINA      Name: Roderick        Past Medical History:   Diagnosis Date    Abnormal Pap smear of cervix     Anemia     Heavy periods     Hypertension     PCOS (polycystic ovarian syndrome)      Past Surgical History:   Procedure Laterality Date     SECTION           (Not in a hospital admission)    Review of patient's allergies indicates:  No Known Allergies     Family History     Problem Relation (Age of Onset)    Breast cancer Mother, Maternal Aunt        Social History Main Topics    Smoking status: Former Smoker    Smokeless tobacco: Former User      Comment:  quit 7 years ago-    Alcohol use No    Drug use: No    Sexual activity: Yes     Partners: Male     Review of Systems   Constitutional: Negative for chills and fever.   Eyes: Negative for visual disturbance.   Respiratory: Positive for cough and shortness of breath.    Cardiovascular: Negative for chest pain.   Gastrointestinal: Positive for nausea and vomiting. Negative for abdominal pain.   Genitourinary: Negative for vaginal bleeding and vaginal discharge.   Neurological: Negative for headaches.      Objective:     Vital Signs (Most Recent):  Temp: 98.2 °F (36.8 °C) (01/24/18 1454)  Pulse: 100 (01/24/18 1805)  Resp: 18 (01/24/18 1454)  BP: 125/72 (01/24/18 1805)  SpO2: 96 % (01/24/18 1615) Vital Signs (24h Range):  Temp:  [98.2 °F (36.8 °C)] 98.2 °F (36.8 °C)  Pulse:  [] 100  Resp:  [18] 18  SpO2:  [96 %-98 %] 96 %  BP: (114-156)/() 125/72        There is no height or weight on file to calculate BMI.    BPP 8/8    Physical Exam:   Constitutional: She is oriented to person, place, and time. She appears well-developed and well-nourished. No distress.    HENT:   Head: Normocephalic and atraumatic.    Eyes: EOM are normal.    Neck: Normal range of motion.    Cardiovascular: Normal rate, regular rhythm and normal heart sounds.     Pulmonary/Chest: Effort normal and breath sounds normal. No respiratory distress. She has no wheezes. She has no rales.        Abdominal: Soft. There is no tenderness.             Musculoskeletal: She exhibits no edema.       Neurological: She is alert and oriented to person, place, and time. She has normal reflexes.    Skin: She is not diaphoretic.    Psychiatric: She has a normal mood and affect. Her behavior is normal. Judgment and thought content normal.       Cervix:  Deferred  Presentation: Vertex     Significant Labs:  Lab Results   Component Value Date    GROUPTRH O POS 09/14/2017    HEPBSAG Negative 09/14/2017       Recent Labs  Lab 01/24/18  1459   WBC 7.63   RBC 4.81    HGB 13.1   HCT 40.2      MCV 84   MCH 27.2   MCHC 32.6     Sodium 136    Potassium 3.9    Chloride 104    CO2 25    Glucose 138     BUN, Bld 9    Creatinine 0.9    Calcium 8.6     Total Protein 6.7    Albumin 2.9     Total Bilirubin 0.2    Alkaline Phosphatase 102    AST 65          Anion Gap 7     eGFR if  >60    eGFR if non  >60      PCR: 0.12    I have personallly reviewed all pertinent lab results from the last 24 hours.    Assessment/Plan:     27 y.o. female  at 26w3d for:    * Elevated liver enzymes    - Admit to antepartum  - Consents signed for Delivery and Blood   - Scanned - vertex presentation  -  BPP on  @ 1600  - Pre-E Labs - P/C: 0.12, AST/ALT: 65/106, Plt:241, Cr:0.9  - US:   g 34%  - CBC //241  /3.9/104/25/9/0.9 random glucose 138  - Hep panel, amylase, lipase pending  - repeat CMP in AM  - BP: (114-156)/() 125/72 . Will continue to monitor BPs.   - NST BID  - regular diet  - On call Staff updated and in agreement with plan           Chronic hypertension affecting pregnancy    - BP: (114-156)/() 125/72  - continue to monitor  - continue home meds (procardia 30XL, labetalol 400mg BID)        Influenza B    - continue tamiflu  - continue promethazine prn  - tessalon perls PRN            Sushma K Reddy, MD  Obstetrics  [facility]    ADDENDUM:  After discussion with Maternal and fetal medicine, the patient was deemed stable for discharge to home with close follow up.  patient understood all instructions and follow- was arranged.

## 2018-01-25 NOTE — TELEPHONE ENCOUNTER
----- Message from Carson Trimble MD sent at 1/25/2018  7:37 AM CST -----  Ang,    In [facility] yesterday her BP basically settled down to where she normally lives, had a negative PC and except for a slight LFT bump normal labs. I told them they could send her home as long as she was tolerating po as opposed to admitting her with flu and exposing everyone. I told them she needs to come back for repeat labs either later this week or beginning of next week. Just wanted to make sure you are aware of what went on. IN looking at her BPs I am ok riding out the 140-150/ but would also be ok with bumping the labetalol a little if you wanted as well.    Ritesh

## 2018-01-25 NOTE — HOSPITAL COURSE
01/24/2018 - Initially admitted from [facility] for obs. However, after consultation with M on call decision made to discharge with close follow up.

## 2018-01-25 NOTE — SUBJECTIVE & OBJECTIVE
Obstetric History       T1      L1     SAB0   TAB0   Ectopic0   Multiple0   Live Births1       # Outcome Date GA Lbr Aiden/2nd Weight Sex Delivery Anes PTL Lv   2 Current            1 Term 11 39w0d  2.722 kg (6 lb) M CS-LTranv  N KALINA      Name: Roderick        Past Medical History:   Diagnosis Date    Abnormal Pap smear of cervix     Anemia     Heavy periods     Hypertension     PCOS (polycystic ovarian syndrome)      Past Surgical History:   Procedure Laterality Date     SECTION           (Not in a hospital admission)    Review of patient's allergies indicates:  No Known Allergies     Family History     Problem Relation (Age of Onset)    Breast cancer Mother, Maternal Aunt        Social History Main Topics    Smoking status: Former Smoker    Smokeless tobacco: Former User      Comment: quit 7 years ago-    Alcohol use No    Drug use: No    Sexual activity: Yes     Partners: Male     Review of Systems   Constitutional: Negative for chills and fever.   Eyes: Negative for visual disturbance.   Respiratory: Positive for cough and shortness of breath.    Cardiovascular: Negative for chest pain.   Gastrointestinal: Positive for nausea and vomiting. Negative for abdominal pain.   Genitourinary: Negative for vaginal bleeding and vaginal discharge.   Neurological: Negative for headaches.      Objective:     Vital Signs (Most Recent):  Temp: 98.2 °F (36.8 °C) (18 1454)  Pulse: 100 (18 1805)  Resp: 18 (18 1454)  BP: 125/72 (18 1805)  SpO2: 96 % (18 1615) Vital Signs (24h Range):  Temp:  [98.2 °F (36.8 °C)] 98.2 °F (36.8 °C)  Pulse:  [] 100  Resp:  [18] 18  SpO2:  [96 %-98 %] 96 %  BP: (114-156)/() 125/72        There is no height or weight on file to calculate BMI.    BPP 8    Physical Exam:   Constitutional: She is oriented to person, place, and time. She appears well-developed and well-nourished. No distress.    HENT:   Head: Normocephalic and  atraumatic.    Eyes: EOM are normal.    Neck: Normal range of motion.    Cardiovascular: Normal rate, regular rhythm and normal heart sounds.     Pulmonary/Chest: Effort normal and breath sounds normal. No respiratory distress. She has no wheezes. She has no rales.        Abdominal: Soft. There is no tenderness.             Musculoskeletal: She exhibits no edema.       Neurological: She is alert and oriented to person, place, and time. She has normal reflexes.    Skin: She is not diaphoretic.    Psychiatric: She has a normal mood and affect. Her behavior is normal. Judgment and thought content normal.       Cervix:  Deferred  Presentation: Vertex     Significant Labs:  Lab Results   Component Value Date    GROUPTRH O POS 09/14/2017    HEPBSAG Negative 09/14/2017       Recent Labs  Lab 01/24/18  1459   WBC 7.63   RBC 4.81   HGB 13.1   HCT 40.2      MCV 84   MCH 27.2   MCHC 32.6     Sodium 136    Potassium 3.9    Chloride 104    CO2 25    Glucose 138     BUN, Bld 9    Creatinine 0.9    Calcium 8.6     Total Protein 6.7    Albumin 2.9     Total Bilirubin 0.2    Alkaline Phosphatase 102    AST 65          Anion Gap 7     eGFR if  >60    eGFR if non  >60      PCR: 0.12    I have personallly reviewed all pertinent lab results from the last 24 hours.

## 2018-01-25 NOTE — ASSESSMENT & PLAN NOTE
- Admit to antepartum  - Consents signed for Delivery and Blood   - Scanned - vertex presentation  - 8/8 BPP on 1/24 @ 1600  - Pre-E Labs - P/C: 0.12, AST/ALT: 65/106, Plt:241, Cr:0.9  - US: 1/11  g 34%  - CBC 7/13/30/241  /3.9/104/25/9/0.9 random glucose 138  - Hep panel, amylase, lipase pending  - repeat CMP in AM  - BP: (114-156)/() 125/72 . Will continue to monitor BPs.   - NST BID  - regular diet  - On call Staff updated and in agreement with plan

## 2018-01-25 NOTE — ASSESSMENT & PLAN NOTE
- BP: (114-156)/() 125/72  - continue to monitor  - continue home meds (procardia 30XL, labetalol 400mg BID)

## 2018-01-25 NOTE — ED NOTES
Discharge instructions sent with patient along with a note to return to work on Friday 1/26. Patient verbalized understanding of discharge instructions.

## 2018-01-25 NOTE — DISCHARGE INSTRUCTIONS
Contact your primary OB or after hours at [phone] if you experience any of the following:    Contractions every 7-10 minutes for 1 or more hours.   A sudden gush or constant leaking of fluid.  Heavy vaginal bleeding.   If you experience a constant headache, blurry vision, pain underneath your right rib, or sudden swelling of your hands, feet, and face.   If you are 28 weeks pregnant or greater, you can measure kick counts with a goal of 10 or more movements within 2 hours.     Remember to stay hydrated; drink 8-10 bottles of water a day.     Maintain all follow-up appointments.

## 2018-01-26 NOTE — DISCHARGE SUMMARY
[facility]  Obstetrics  Discharge Summary      Patient Name: Kaylie Fuentes  MRN: [id number]  Admission Date: 2018  Hospital Length of Stay: 0 days  Discharge Date and Time: 2018  7:16 PM  Attending Physician: No att. providers found   Discharging Provider: Shelly Marx MD  Primary Care Provider: Primary Doctor Blanca    HPI: Kaylie Fuentes is a 27 y.o. [id number] at 26w3d presented to the [facility] following elevated blood pressures in clinic. Patient is positive for influenza B and has been on Tamiflu since Monday. She has a cough and has nausea when taking her medications. She had episode of emesis x 1 in clinic today but denies other vomiting. She has had a headache with the flu but denies current headache. She notes shortness of breath with her cough. She denies vision changes or RUQ pain.  In the [facility], patient was found to have AST/ALT of 65/106 respectively. CBC and rest of CMP were wnl. PCR was 0.12.  Patient denies contractions, denies vaginal bleeding, denies LOF.   Fetal Movement: normal.   This IUP is complicated by cHTN (on procardia 30XL and labetalol 400mg BID - she took her morning medications), obesity, and CT this pregnancy with negative SHERICE.    * No surgery found *     Hospital Course:   2018 - Initially admitted from [facility] for obs. However, after consultation with MFM on call decision made to discharge with close follow up.        Final Active Diagnoses:    Diagnosis Date Noted POA    PRINCIPAL PROBLEM:  Elevated liver enzymes [R74.8] 2018 Yes    Influenza B [J10.1] 2018 Unknown    Chronic hypertension affecting pregnancy [O10.919] 2018 Unknown      Problems Resolved During this Admission:    Diagnosis Date Noted Date Resolved POA        Labs:   CMP   Recent Labs  Lab 18  1459      K 3.9      CO2 25   *   BUN 9   CREATININE 0.9   CALCIUM 8.6*   PROT 6.7   ALBUMIN 2.9*   BILITOT 0.2   ALKPHOS 102   AST 65*   *   ANIONGAP  7*   ESTGFRAFRICA >60   EGFRNONAA >60    and CBC   Recent Labs  Lab 01/24/18  1459   WBC 7.63   HGB 13.1   HCT 40.2            Immunizations     None          This patient has no babies on file.  Pending Diagnostic Studies:     None          Discharged Condition: good    Disposition: Home or Self Care    Follow Up:    Patient Instructions:   No discharge procedures on file.  Medications:  Discharge Medication List as of 1/24/2018  7:07 PM      CONTINUE these medications which have NOT CHANGED    Details   guaifenesin 100 mg/5 ml (ROBITUSSIN) 100 mg/5 mL syrup Take 200 mg by mouth 3 (three) times daily as needed for Cough., Historical Med      labetalol (NORMODYNE) 200 MG tablet Take 2 tablets (400 mg total) by mouth 2 (two) times daily., Starting Wed 10/11/2017, Until Thu 10/11/2018, Normal      nifedipine (PROCARDIA-XL) 30 MG (OSM) 24 hr tablet Take 1 tablet (30 mg total) by mouth once daily., Starting Tue 10/17/2017, Until Wed 10/17/2018, Normal      oseltamivir (TAMIFLU) 30 MG capsule Take 75 mg by mouth 2 (two) times daily., Historical Med      PRENATAL VIT/IRON FUM/FOLIC AC (PRENATAL 1+1 ORAL) Take by mouth., Historical Med      promethazine (PHENERGAN) 6.25 mg/5 mL syrup Take 25 mg by mouth every 6 (six) hours as needed for Nausea., Historical Med             Shelly Marx MD  Obstetrics  [facility]

## 2018-02-07 ENCOUNTER — PATIENT MESSAGE (OUTPATIENT)
Dept: OBSTETRICS AND GYNECOLOGY | Facility: CLINIC | Age: 28
End: 2018-02-07

## 2018-02-12 ENCOUNTER — OFFICE VISIT (OUTPATIENT)
Dept: MATERNAL FETAL MEDICINE | Facility: CLINIC | Age: 28
End: 2018-02-12
Attending: OBSTETRICS & GYNECOLOGY
Payer: MEDICAID

## 2018-02-12 DIAGNOSIS — O10.919 HTN IN PREGNANCY, CHRONIC: Primary | ICD-10-CM

## 2018-02-12 DIAGNOSIS — O10.919 CHRONIC HYPERTENSION AFFECTING PREGNANCY: ICD-10-CM

## 2018-02-12 PROCEDURE — 76816 OB US FOLLOW-UP PER FETUS: CPT | Mod: PBBFAC | Performed by: OBSTETRICS & GYNECOLOGY

## 2018-02-12 PROCEDURE — 76816 OB US FOLLOW-UP PER FETUS: CPT | Mod: 26,S$PBB,, | Performed by: OBSTETRICS & GYNECOLOGY

## 2018-02-12 PROCEDURE — 99499 UNLISTED E&M SERVICE: CPT | Mod: S$PBB,,, | Performed by: OBSTETRICS & GYNECOLOGY

## 2018-02-12 NOTE — PROGRESS NOTES
OB Ultrasound Report (see PDF version under imaging tab)    Indication  ========    Evaluation of fetal growth/anatomy.    History  ======    Risk Factors  Details: Chronic Hypertension  Details: Obesity  Details: Lisinopril exposure    Method  ======    Transabdominal ultrasound examination. View: Good view.    Pregnancy  =========    Phillip pregnancy. Number of fetuses: 1.    Dating  ======    LMP on: 7/23/2017  Cycle: regular cycle  GA by LMP 29 w + 1 d  ANDERSON by LMP: 4/29/2018  Ultrasound examination on: 2/12/2018  GA by U/S based upon: AC, BPD, Femur, HC  GA by U/S 28 w + 4 d  ANDERSON by U/S: 5/3/2018  Assigned: Dating performed on 11/7/2017, based on the LMP  Assigned GA 29 w + 1 d  Assigned ANDERSON: 4/29/2018    General Evaluation  ===============    Cardiac activity: present.  bpm.  Fetal movements: visualized.  Presentation: breech.  Placenta:  Placental site: posterior, right.  Umbilical cord: Cord vessels: 3 vessel cord.  Amniotic fluid: MVP 5.3 cm.    Fetal Biometry  ===========    Fetal Biometry  BPD 73.4 mm 29w 3d Hadlock  OFD 92.5 mm 29w 6d Danii  .9 mm 29w 4d Hadlock  .0 mm 27w 0d Hadlock  Femur 53.0 mm 28w 1d Hadlock  EFW 1,128 g 21% Joseph  Calculated by: Hadlock (BPD-HC-AC-FL)  EFW (lb) 2 lb  EFW (oz) 8 oz  Cephalic index 0.79  HC / AC 1.20  FL / BPD 0.72  FL / AC 0.23  MVP 5.3 cm   bpm    Fetal Anatomy  ===========    Cranium: normal  Neck: suboptimal  Lips: normal  Nose: normal  4-chamber view: suboptimal  RVOT: suboptimal  LVOT: suboptimal  Heart / Thorax: Patient seen by Peds Cardiology on 12/21/18 with Dr. Cordero: Technically difficult study.  Normal fetal echocardiogram.  Stomach: normal  Kidneys: normal  Bladder: normal  Gender: male  Wants to know gender: yes  Other: A full anatomy survey previously performed.    Impression  =========    Fetal size is AGA with the EFW at the 21st percentile.  Normal repeat limited fetal anatomic survey. AFV is normal. Follow-up  ultrasound in 4 weeks scheduled to assess fetal growth due to  maternal HTN.

## 2018-02-12 NOTE — LETTER
February 12, 2018      Nancy Lamb MD  [address]  [address]  [place] [address]           [facility]  [address]  [place] [address]  Phone: [phone]          Patient: Kaylie Fuentes   MR Number: [id number]   YOB: 1990   Date of Visit: 2/12/2018       Dear Dr. Nancy Lamb:    Thank you for referring Kaylie Fuentes to me for evaluation. Attached you will find relevant portions of my assessment and plan of care.    If you have questions, please do not hesitate to call me. I look forward to following Kaylie Fuentes along with you.    Sincerely,    Deepa Ly MD    Enclosure  CC:  No Recipients    If you would like to receive this communication electronically, please contact [email or web address] or [phone] to request more information on [employer] access.    For providers and/or their staff who would like to refer a patient to [facility], please contact us through our one-stop-shop provider referral line, [facility], at [phone].    If you feel you have received this communication in error or would no longer like to receive these types of communications, please e-mail [email or web address]

## 2018-02-20 ENCOUNTER — PATIENT MESSAGE (OUTPATIENT)
Dept: OBSTETRICS AND GYNECOLOGY | Facility: CLINIC | Age: 28
End: 2018-02-20

## 2018-02-27 ENCOUNTER — ANESTHESIA EVENT (OUTPATIENT)
Dept: OBSTETRICS AND GYNECOLOGY | Facility: OTHER | Age: 28
End: 2018-02-27
Payer: MEDICAID

## 2018-02-27 ENCOUNTER — ANESTHESIA (OUTPATIENT)
Dept: OBSTETRICS AND GYNECOLOGY | Facility: OTHER | Age: 28
End: 2018-02-27
Payer: MEDICAID

## 2018-02-27 ENCOUNTER — ROUTINE PRENATAL (OUTPATIENT)
Dept: OBSTETRICS AND GYNECOLOGY | Facility: CLINIC | Age: 28
End: 2018-02-27
Payer: MEDICAID

## 2018-02-27 ENCOUNTER — HOSPITAL ENCOUNTER (INPATIENT)
Facility: OTHER | Age: 28
LOS: 4 days | Discharge: HOME | End: 2018-03-03
Attending: OBSTETRICS & GYNECOLOGY | Admitting: OBSTETRICS & GYNECOLOGY
Payer: MEDICAID

## 2018-02-27 VITALS — WEIGHT: 293 LBS | SYSTOLIC BLOOD PRESSURE: 172 MMHG | BODY MASS INDEX: 46.92 KG/M2 | DIASTOLIC BLOOD PRESSURE: 122 MMHG

## 2018-02-27 DIAGNOSIS — O10.913 CHRONIC HYPERTENSION WITH EXACERBATION DURING PREGNANCY IN THIRD TRIMESTER: ICD-10-CM

## 2018-02-27 DIAGNOSIS — O11.9 CHRONIC HYPERTENSION WITH SUPERIMPOSED PREECLAMPSIA: Primary | ICD-10-CM

## 2018-02-27 DIAGNOSIS — O16.1 HYPERTENSION AFFECTING PREGNANCY IN FIRST TRIMESTER: ICD-10-CM

## 2018-02-27 DIAGNOSIS — O10.013 PRE-EXISTING ESSENTIAL HYPERTENSION DURING PREGNANCY IN THIRD TRIMESTER: ICD-10-CM

## 2018-02-27 DIAGNOSIS — Z34.80 SUPERVISION OF OTHER NORMAL PREGNANCY, ANTEPARTUM: Primary | ICD-10-CM

## 2018-02-27 DIAGNOSIS — O10.919 CHRONIC HYPERTENSION AFFECTING PREGNANCY: ICD-10-CM

## 2018-02-27 DIAGNOSIS — Z3A.31 31 WEEKS GESTATION OF PREGNANCY: ICD-10-CM

## 2018-02-27 PROBLEM — E66.813 CLASS 3 SEVERE OBESITY DUE TO EXCESS CALORIES WITH BODY MASS INDEX (BMI) OF 40.0 TO 44.9 IN ADULT: Status: ACTIVE | Noted: 2018-02-27

## 2018-02-27 PROBLEM — E66.01 CLASS 3 SEVERE OBESITY DUE TO EXCESS CALORIES WITH BODY MASS INDEX (BMI) OF 40.0 TO 44.9 IN ADULT: Status: ACTIVE | Noted: 2018-02-27

## 2018-02-27 LAB
ABO + RH BLD: NORMAL
ALBUMIN SERPL BCP-MCNC: 2.7 G/DL
ALBUMIN SERPL BCP-MCNC: 3 G/DL
ALBUMIN SERPL BCP-MCNC: 3 G/DL
ALP SERPL-CCNC: 92 U/L
ALP SERPL-CCNC: 95 U/L
ALP SERPL-CCNC: 99 U/L
ALT SERPL W/O P-5'-P-CCNC: 22 U/L
ALT SERPL W/O P-5'-P-CCNC: 23 U/L
ALT SERPL W/O P-5'-P-CCNC: 25 U/L
ANION GAP SERPL CALC-SCNC: 12 MMOL/L
ANION GAP SERPL CALC-SCNC: 13 MMOL/L
ANION GAP SERPL CALC-SCNC: 13 MMOL/L
AST SERPL-CCNC: 19 U/L
AST SERPL-CCNC: 20 U/L
AST SERPL-CCNC: 21 U/L
BASOPHILS # BLD AUTO: 0 K/UL
BASOPHILS # BLD AUTO: 0.01 K/UL
BASOPHILS # BLD AUTO: 0.01 K/UL
BASOPHILS NFR BLD: 0 %
BASOPHILS NFR BLD: 0.1 %
BASOPHILS NFR BLD: 0.1 %
BILIRUB SERPL-MCNC: 0.2 MG/DL
BLD GP AB SCN CELLS X3 SERPL QL: NORMAL
BUN SERPL-MCNC: 11 MG/DL
BUN SERPL-MCNC: 11 MG/DL
BUN SERPL-MCNC: 13 MG/DL
CALCIUM SERPL-MCNC: 8.9 MG/DL
CALCIUM SERPL-MCNC: 8.9 MG/DL
CALCIUM SERPL-MCNC: 9.2 MG/DL
CHLORIDE SERPL-SCNC: 103 MMOL/L
CHLORIDE SERPL-SCNC: 104 MMOL/L
CHLORIDE SERPL-SCNC: 105 MMOL/L
CO2 SERPL-SCNC: 17 MMOL/L
CO2 SERPL-SCNC: 17 MMOL/L
CO2 SERPL-SCNC: 18 MMOL/L
CREAT SERPL-MCNC: 0.8 MG/DL
CREAT UR-MCNC: 411.6 MG/DL
DIFFERENTIAL METHOD: ABNORMAL
EOSINOPHIL # BLD AUTO: 0 K/UL
EOSINOPHIL # BLD AUTO: 0 K/UL
EOSINOPHIL # BLD AUTO: 0.2 K/UL
EOSINOPHIL NFR BLD: 0.1 %
EOSINOPHIL NFR BLD: 0.1 %
EOSINOPHIL NFR BLD: 1.7 %
ERYTHROCYTE [DISTWIDTH] IN BLOOD BY AUTOMATED COUNT: 14.9 %
ERYTHROCYTE [DISTWIDTH] IN BLOOD BY AUTOMATED COUNT: 14.9 %
ERYTHROCYTE [DISTWIDTH] IN BLOOD BY AUTOMATED COUNT: 15 %
EST. GFR  (AFRICAN AMERICAN): >60 ML/MIN/1.73 M^2
EST. GFR  (NON AFRICAN AMERICAN): >60 ML/MIN/1.73 M^2
GLUCOSE SERPL-MCNC: 115 MG/DL
GLUCOSE SERPL-MCNC: 115 MG/DL
GLUCOSE SERPL-MCNC: 83 MG/DL
HCT VFR BLD AUTO: 38.4 %
HCT VFR BLD AUTO: 41.2 %
HCT VFR BLD AUTO: 41.4 %
HGB BLD-MCNC: 12.7 G/DL
HGB BLD-MCNC: 13.7 G/DL
HGB BLD-MCNC: 13.8 G/DL
LDH SERPL L TO P-CCNC: 230 U/L
LDH SERPL L TO P-CCNC: 235 U/L
LYMPHOCYTES # BLD AUTO: 1.4 K/UL
LYMPHOCYTES # BLD AUTO: 1.5 K/UL
LYMPHOCYTES # BLD AUTO: 2.2 K/UL
LYMPHOCYTES NFR BLD: 12.7 %
LYMPHOCYTES NFR BLD: 12.7 %
LYMPHOCYTES NFR BLD: 22.3 %
MCH RBC QN AUTO: 27.3 PG
MCH RBC QN AUTO: 27.4 PG
MCH RBC QN AUTO: 27.7 PG
MCHC RBC AUTO-ENTMCNC: 33.1 G/DL
MCHC RBC AUTO-ENTMCNC: 33.1 G/DL
MCHC RBC AUTO-ENTMCNC: 33.5 G/DL
MCV RBC AUTO: 83 FL
MONOCYTES # BLD AUTO: 0.2 K/UL
MONOCYTES # BLD AUTO: 0.2 K/UL
MONOCYTES # BLD AUTO: 1 K/UL
MONOCYTES NFR BLD: 1.3 %
MONOCYTES NFR BLD: 1.4 %
MONOCYTES NFR BLD: 9.9 %
NEUTROPHILS # BLD AUTO: 6.4 K/UL
NEUTROPHILS # BLD AUTO: 9.6 K/UL
NEUTROPHILS # BLD AUTO: 9.7 K/UL
NEUTROPHILS NFR BLD: 65.8 %
NEUTROPHILS NFR BLD: 85.3 %
NEUTROPHILS NFR BLD: 85.6 %
PLATELET # BLD AUTO: 217 K/UL
PLATELET # BLD AUTO: 237 K/UL
PLATELET # BLD AUTO: 259 K/UL
PMV BLD AUTO: 12.1 FL
PMV BLD AUTO: 12.4 FL
PMV BLD AUTO: 12.4 FL
POTASSIUM SERPL-SCNC: 4 MMOL/L
POTASSIUM SERPL-SCNC: 4.3 MMOL/L
POTASSIUM SERPL-SCNC: 4.3 MMOL/L
PROT SERPL-MCNC: 6.8 G/DL
PROT SERPL-MCNC: 7.4 G/DL
PROT SERPL-MCNC: 7.4 G/DL
PROT UR-MCNC: 101 MG/DL
PROT/CREAT RATIO, UR: 0.25
RBC # BLD AUTO: 4.63 M/UL
RBC # BLD AUTO: 4.98 M/UL
RBC # BLD AUTO: 5.01 M/UL
SODIUM SERPL-SCNC: 133 MMOL/L
SODIUM SERPL-SCNC: 133 MMOL/L
SODIUM SERPL-SCNC: 136 MMOL/L
WBC # BLD AUTO: 11.18 K/UL
WBC # BLD AUTO: 11.38 K/UL
WBC # BLD AUTO: 9.76 K/UL

## 2018-02-27 PROCEDURE — 99213 OFFICE O/P EST LOW 20 MIN: CPT | Mod: PBBFAC,TH | Performed by: NURSE PRACTITIONER

## 2018-02-27 PROCEDURE — 99999 PR PBB SHADOW E&M-EST. PATIENT-LVL III: CPT | Mod: PBBFAC,,, | Performed by: NURSE PRACTITIONER

## 2018-02-27 PROCEDURE — S0028 INJECTION, FAMOTIDINE, 20 MG: HCPCS

## 2018-02-27 PROCEDURE — 25000003 PHARM REV CODE 250: Performed by: STUDENT IN AN ORGANIZED HEALTH CARE EDUCATION/TRAINING PROGRAM

## 2018-02-27 PROCEDURE — 25000003 PHARM REV CODE 250

## 2018-02-27 PROCEDURE — 11000001 HC ACUTE MED/SURG PRIVATE ROOM

## 2018-02-27 PROCEDURE — 63600175 PHARM REV CODE 636 W HCPCS: Performed by: OBSTETRICS & GYNECOLOGY

## 2018-02-27 PROCEDURE — 59514 CESAREAN DELIVERY ONLY: CPT | Mod: ,,, | Performed by: ANESTHESIOLOGY

## 2018-02-27 PROCEDURE — 80053 COMPREHEN METABOLIC PANEL: CPT | Mod: 91

## 2018-02-27 PROCEDURE — 63600175 PHARM REV CODE 636 W HCPCS: Performed by: STUDENT IN AN ORGANIZED HEALTH CARE EDUCATION/TRAINING PROGRAM

## 2018-02-27 PROCEDURE — 83615 LACTATE (LD) (LDH) ENZYME: CPT | Mod: 91

## 2018-02-27 PROCEDURE — 96374 THER/PROPH/DIAG INJ IV PUSH: CPT

## 2018-02-27 PROCEDURE — 99900059 HC C-SECTION ATTEND (STAT)

## 2018-02-27 PROCEDURE — 99284 EMERGENCY DEPT VISIT MOD MDM: CPT | Mod: 25,27

## 2018-02-27 PROCEDURE — 99223 1ST HOSP IP/OBS HIGH 75: CPT | Mod: ,,, | Performed by: PEDIATRICS

## 2018-02-27 PROCEDURE — 96376 TX/PRO/DX INJ SAME DRUG ADON: CPT

## 2018-02-27 PROCEDURE — 86901 BLOOD TYPING SEROLOGIC RH(D): CPT

## 2018-02-27 PROCEDURE — 99284 EMERGENCY DEPT VISIT MOD MDM: CPT | Mod: 25,,, | Performed by: OBSTETRICS & GYNECOLOGY

## 2018-02-27 PROCEDURE — 37000008 HC ANESTHESIA 1ST 15 MINUTES: Performed by: OBSTETRICS & GYNECOLOGY

## 2018-02-27 PROCEDURE — 36415 COLL VENOUS BLD VENIPUNCTURE: CPT

## 2018-02-27 PROCEDURE — 99212 OFFICE O/P EST SF 10 MIN: CPT | Mod: 25,S$PBB,TH, | Performed by: NURSE PRACTITIONER

## 2018-02-27 PROCEDURE — 99900035 HC TECH TIME PER 15 MIN (STAT)

## 2018-02-27 PROCEDURE — 86870 RBC ANTIBODY IDENTIFICATION: CPT

## 2018-02-27 PROCEDURE — 96372 THER/PROPH/DIAG INJ SC/IM: CPT

## 2018-02-27 PROCEDURE — 87081 CULTURE SCREEN ONLY: CPT

## 2018-02-27 PROCEDURE — 59025 FETAL NON-STRESS TEST: CPT | Mod: 26,,, | Performed by: OBSTETRICS & GYNECOLOGY

## 2018-02-27 PROCEDURE — 80053 COMPREHEN METABOLIC PANEL: CPT

## 2018-02-27 PROCEDURE — 3008F BODY MASS INDEX DOCD: CPT | Mod: ,,, | Performed by: NURSE PRACTITIONER

## 2018-02-27 PROCEDURE — 59514 CESAREAN DELIVERY ONLY: CPT | Mod: AT,,, | Performed by: OBSTETRICS & GYNECOLOGY

## 2018-02-27 PROCEDURE — 25000003 PHARM REV CODE 250: Performed by: OBSTETRICS & GYNECOLOGY

## 2018-02-27 PROCEDURE — 37000009 HC ANESTHESIA EA ADD 15 MINS: Performed by: OBSTETRICS & GYNECOLOGY

## 2018-02-27 PROCEDURE — 85025 COMPLETE CBC W/AUTO DIFF WBC: CPT | Mod: 91

## 2018-02-27 PROCEDURE — 82570 ASSAY OF URINE CREATININE: CPT

## 2018-02-27 PROCEDURE — 86905 BLOOD TYPING RBC ANTIGENS: CPT

## 2018-02-27 RX ORDER — MAGNESIUM SULFATE HEPTAHYDRATE 40 MG/ML
2 INJECTION, SOLUTION INTRAVENOUS CONTINUOUS
Status: DISCONTINUED | OUTPATIENT
Start: 2018-02-27 | End: 2018-02-28

## 2018-02-27 RX ORDER — SODIUM CHLORIDE, SODIUM LACTATE, POTASSIUM CHLORIDE, CALCIUM CHLORIDE 600; 310; 30; 20 MG/100ML; MG/100ML; MG/100ML; MG/100ML
1000 INJECTION, SOLUTION INTRAVENOUS CONTINUOUS
Status: ACTIVE | OUTPATIENT
Start: 2018-02-27 | End: 2018-02-28

## 2018-02-27 RX ORDER — SODIUM CHLORIDE, SODIUM LACTATE, POTASSIUM CHLORIDE, CALCIUM CHLORIDE 600; 310; 30; 20 MG/100ML; MG/100ML; MG/100ML; MG/100ML
INJECTION, SOLUTION INTRAVENOUS CONTINUOUS
Status: DISCONTINUED | OUTPATIENT
Start: 2018-02-27 | End: 2018-02-27

## 2018-02-27 RX ORDER — PRENATAL WITH FERROUS FUM AND FOLIC ACID 3080; 920; 120; 400; 22; 1.84; 3; 20; 10; 1; 12; 200; 27; 25; 2 [IU]/1; [IU]/1; MG/1; [IU]/1; MG/1; MG/1; MG/1; MG/1; MG/1; MG/1; UG/1; MG/1; MG/1; MG/1; MG/1
1 TABLET ORAL DAILY
Status: DISCONTINUED | OUTPATIENT
Start: 2018-02-27 | End: 2018-02-28

## 2018-02-27 RX ORDER — CEFAZOLIN SODIUM 2 G/50ML
2 SOLUTION INTRAVENOUS ONCE
Status: DISCONTINUED | OUTPATIENT
Start: 2018-02-27 | End: 2018-02-27

## 2018-02-27 RX ORDER — OXYTOCIN 10 [USP'U]/ML
INJECTION, SOLUTION INTRAMUSCULAR; INTRAVENOUS
Status: DISCONTINUED | OUTPATIENT
Start: 2018-02-27 | End: 2018-02-28

## 2018-02-27 RX ORDER — BUTALBITAL, ACETAMINOPHEN AND CAFFEINE 50; 325; 40 MG/1; MG/1; MG/1
2 TABLET ORAL ONCE
Status: COMPLETED | OUTPATIENT
Start: 2018-02-27 | End: 2018-02-27

## 2018-02-27 RX ORDER — FENTANYL CITRATE 50 UG/ML
INJECTION, SOLUTION INTRAMUSCULAR; INTRAVENOUS
Status: DISCONTINUED | OUTPATIENT
Start: 2018-02-27 | End: 2018-02-28

## 2018-02-27 RX ORDER — MISOPROSTOL 200 UG/1
TABLET ORAL
Status: DISCONTINUED
Start: 2018-02-27 | End: 2018-02-28 | Stop reason: WASHOUT

## 2018-02-27 RX ORDER — BETAMETHASONE SODIUM PHOSPHATE AND BETAMETHASONE ACETATE 3; 3 MG/ML; MG/ML
12 INJECTION, SUSPENSION INTRA-ARTICULAR; INTRALESIONAL; INTRAMUSCULAR; SOFT TISSUE EVERY 24 HOURS
Status: DISCONTINUED | OUTPATIENT
Start: 2018-02-27 | End: 2018-02-28

## 2018-02-27 RX ORDER — FAMOTIDINE 10 MG/ML
20 INJECTION INTRAVENOUS ONCE
Status: DISCONTINUED | OUTPATIENT
Start: 2018-02-27 | End: 2018-02-28

## 2018-02-27 RX ORDER — KETOROLAC TROMETHAMINE 30 MG/ML
INJECTION, SOLUTION INTRAMUSCULAR; INTRAVENOUS
Status: DISCONTINUED | OUTPATIENT
Start: 2018-02-27 | End: 2018-02-28

## 2018-02-27 RX ORDER — CALCIUM GLUCONATE 98 MG/ML
1 INJECTION, SOLUTION INTRAVENOUS
Status: DISCONTINUED | OUTPATIENT
Start: 2018-02-27 | End: 2018-02-28

## 2018-02-27 RX ORDER — CARBOPROST TROMETHAMINE 250 UG/ML
INJECTION, SOLUTION INTRAMUSCULAR
Status: DISCONTINUED
Start: 2018-02-27 | End: 2018-02-28 | Stop reason: WASHOUT

## 2018-02-27 RX ORDER — ACETAMINOPHEN 10 MG/ML
INJECTION, SOLUTION INTRAVENOUS
Status: DISCONTINUED | OUTPATIENT
Start: 2018-02-27 | End: 2018-02-28

## 2018-02-27 RX ORDER — AMOXICILLIN 250 MG
1 CAPSULE ORAL NIGHTLY PRN
Status: DISCONTINUED | OUTPATIENT
Start: 2018-02-27 | End: 2018-02-28

## 2018-02-27 RX ORDER — HYDRALAZINE HYDROCHLORIDE 20 MG/ML
10 INJECTION INTRAMUSCULAR; INTRAVENOUS ONCE
Status: COMPLETED | OUTPATIENT
Start: 2018-02-27 | End: 2018-02-27

## 2018-02-27 RX ORDER — MAGNESIUM SULFATE HEPTAHYDRATE 40 MG/ML
2 INJECTION, SOLUTION INTRAVENOUS ONCE
Status: DISCONTINUED | OUTPATIENT
Start: 2018-02-27 | End: 2018-02-27

## 2018-02-27 RX ORDER — CEFAZOLIN SODIUM 1 G/3ML
2 INJECTION, POWDER, FOR SOLUTION INTRAMUSCULAR; INTRAVENOUS ONCE
Status: COMPLETED | OUTPATIENT
Start: 2018-02-27 | End: 2018-02-27

## 2018-02-27 RX ORDER — FAMOTIDINE 10 MG/ML
INJECTION INTRAVENOUS
Status: COMPLETED
Start: 2018-02-27 | End: 2018-02-27

## 2018-02-27 RX ORDER — MAGNESIUM SULFATE HEPTAHYDRATE 40 MG/ML
2 INJECTION, SOLUTION INTRAVENOUS ONCE
Status: COMPLETED | OUTPATIENT
Start: 2018-02-27 | End: 2018-02-27

## 2018-02-27 RX ORDER — ONDANSETRON 8 MG/1
8 TABLET, ORALLY DISINTEGRATING ORAL EVERY 8 HOURS PRN
Status: DISCONTINUED | OUTPATIENT
Start: 2018-02-27 | End: 2018-02-28

## 2018-02-27 RX ORDER — LABETALOL HYDROCHLORIDE 5 MG/ML
40 INJECTION, SOLUTION INTRAVENOUS ONCE
Status: COMPLETED | OUTPATIENT
Start: 2018-02-27 | End: 2018-02-27

## 2018-02-27 RX ORDER — SODIUM CHLORIDE, SODIUM LACTATE, POTASSIUM CHLORIDE, CALCIUM CHLORIDE 600; 310; 30; 20 MG/100ML; MG/100ML; MG/100ML; MG/100ML
INJECTION, SOLUTION INTRAVENOUS CONTINUOUS PRN
Status: DISCONTINUED | OUTPATIENT
Start: 2018-02-27 | End: 2018-02-28

## 2018-02-27 RX ORDER — BUPIVACAINE HYDROCHLORIDE 7.5 MG/ML
INJECTION, SOLUTION INTRASPINAL
Status: DISCONTINUED | OUTPATIENT
Start: 2018-02-27 | End: 2018-02-28

## 2018-02-27 RX ORDER — HYDRALAZINE HYDROCHLORIDE 20 MG/ML
5 INJECTION INTRAMUSCULAR; INTRAVENOUS ONCE
Status: COMPLETED | OUTPATIENT
Start: 2018-02-27 | End: 2018-02-27

## 2018-02-27 RX ORDER — MORPHINE SULFATE 0.5 MG/ML
INJECTION, SOLUTION EPIDURAL; INTRATHECAL; INTRAVENOUS
Status: DISCONTINUED | OUTPATIENT
Start: 2018-02-27 | End: 2018-02-28

## 2018-02-27 RX ORDER — DIPHENHYDRAMINE HYDROCHLORIDE 50 MG/ML
25 INJECTION INTRAMUSCULAR; INTRAVENOUS EVERY 4 HOURS PRN
Status: DISCONTINUED | OUTPATIENT
Start: 2018-02-27 | End: 2018-02-28

## 2018-02-27 RX ORDER — MAGNESIUM SULFATE HEPTAHYDRATE 40 MG/ML
2 INJECTION, SOLUTION INTRAVENOUS ONCE
Status: DISCONTINUED | OUTPATIENT
Start: 2018-02-27 | End: 2018-02-28

## 2018-02-27 RX ORDER — PROCHLORPERAZINE MALEATE 5 MG
5 TABLET ORAL ONCE
Status: COMPLETED | OUTPATIENT
Start: 2018-02-27 | End: 2018-02-27

## 2018-02-27 RX ORDER — LABETALOL HYDROCHLORIDE 5 MG/ML
20 INJECTION, SOLUTION INTRAVENOUS ONCE
Status: COMPLETED | OUTPATIENT
Start: 2018-02-27 | End: 2018-02-27

## 2018-02-27 RX ORDER — PHENYLEPHRINE HYDROCHLORIDE 10 MG/ML
INJECTION INTRAVENOUS
Status: DISCONTINUED | OUTPATIENT
Start: 2018-02-27 | End: 2018-02-28

## 2018-02-27 RX ORDER — NAPROXEN SODIUM 220 MG/1
81 TABLET, FILM COATED ORAL DAILY
Status: DISCONTINUED | OUTPATIENT
Start: 2018-02-27 | End: 2018-02-28

## 2018-02-27 RX ORDER — SIMETHICONE 80 MG
1 TABLET,CHEWABLE ORAL EVERY 6 HOURS PRN
Status: DISCONTINUED | OUTPATIENT
Start: 2018-02-27 | End: 2018-02-28

## 2018-02-27 RX ORDER — ONDANSETRON HYDROCHLORIDE 2 MG/ML
INJECTION, SOLUTION INTRAMUSCULAR; INTRAVENOUS
Status: DISCONTINUED | OUTPATIENT
Start: 2018-02-27 | End: 2018-02-28

## 2018-02-27 RX ORDER — DIPHENHYDRAMINE HCL 25 MG
25 CAPSULE ORAL EVERY 4 HOURS PRN
Status: DISCONTINUED | OUTPATIENT
Start: 2018-02-27 | End: 2018-02-28

## 2018-02-27 RX ORDER — SODIUM CITRATE AND CITRIC ACID MONOHYDRATE 334; 500 MG/5ML; MG/5ML
SOLUTION ORAL
Status: COMPLETED
Start: 2018-02-27 | End: 2018-02-27

## 2018-02-27 RX ADMIN — BUTALBITAL, ACETAMINOPHEN AND CAFFEINE 2 TABLET: 50; 325; 40 TABLET ORAL at 08:02

## 2018-02-27 RX ADMIN — LABETALOL HYDROCHLORIDE 20 MG: 5 INJECTION, SOLUTION INTRAVENOUS at 08:02

## 2018-02-27 RX ADMIN — HYDRALAZINE HYDROCHLORIDE 5 MG: 20 INJECTION INTRAMUSCULAR; INTRAVENOUS at 02:02

## 2018-02-27 RX ADMIN — SODIUM CHLORIDE, SODIUM LACTATE, POTASSIUM CHLORIDE, AND CALCIUM CHLORIDE 1000 ML: .6; .31; .03; .02 INJECTION, SOLUTION INTRAVENOUS at 07:02

## 2018-02-27 RX ADMIN — SODIUM CHLORIDE, SODIUM LACTATE, POTASSIUM CHLORIDE, AND CALCIUM CHLORIDE: 600; 310; 30; 20 INJECTION, SOLUTION INTRAVENOUS at 09:02

## 2018-02-27 RX ADMIN — FENTANYL CITRATE 10 MCG: 50 INJECTION, SOLUTION INTRAMUSCULAR; INTRAVENOUS at 10:02

## 2018-02-27 RX ADMIN — PHENYLEPHRINE HYDROCHLORIDE 200 MCG: 10 INJECTION INTRAVENOUS at 10:02

## 2018-02-27 RX ADMIN — BUPIVACAINE HYDROCHLORIDE IN DEXTROSE 1.6 ML: 7.5 INJECTION, SOLUTION SUBARACHNOID at 10:02

## 2018-02-27 RX ADMIN — PHENYLEPHRINE HYDROCHLORIDE 200 MCG: 10 INJECTION INTRAVENOUS at 11:02

## 2018-02-27 RX ADMIN — PROCHLORPERAZINE MALEATE 5 MG: 5 TABLET, FILM COATED ORAL at 06:02

## 2018-02-27 RX ADMIN — PHENYLEPHRINE HYDROCHLORIDE 100 MCG: 10 INJECTION INTRAVENOUS at 10:02

## 2018-02-27 RX ADMIN — HYDRALAZINE HYDROCHLORIDE 10 MG: 20 INJECTION INTRAMUSCULAR; INTRAVENOUS at 03:02

## 2018-02-27 RX ADMIN — SODIUM CITRATE AND CITRIC ACID MONOHYDRATE 30 ML: 500; 334 SOLUTION ORAL at 10:02

## 2018-02-27 RX ADMIN — FAMOTIDINE 20 MG: 10 INJECTION, SOLUTION INTRAVENOUS at 10:02

## 2018-02-27 RX ADMIN — MAGNESIUM SULFATE HEPTAHYDRATE 2 G/HR: 40 INJECTION, SOLUTION INTRAVENOUS at 01:02

## 2018-02-27 RX ADMIN — OXYTOCIN 3 UNITS: 10 INJECTION, SOLUTION INTRAMUSCULAR; INTRAVENOUS at 11:02

## 2018-02-27 RX ADMIN — LABETALOL HYDROCHLORIDE 40 MG: 5 INJECTION, SOLUTION INTRAVENOUS at 01:02

## 2018-02-27 RX ADMIN — HYDRALAZINE HYDROCHLORIDE 10 MG: 20 INJECTION INTRAMUSCULAR; INTRAVENOUS at 07:02

## 2018-02-27 RX ADMIN — BUTALBITAL, ACETAMINOPHEN AND CAFFEINE 2 TABLET: 50; 325; 40 TABLET ORAL at 03:02

## 2018-02-27 RX ADMIN — Medication 0.15 MG: at 10:02

## 2018-02-27 RX ADMIN — HYDRALAZINE HYDROCHLORIDE 5 MG: 20 INJECTION INTRAMUSCULAR; INTRAVENOUS at 07:02

## 2018-02-27 RX ADMIN — BETAMETHASONE SODIUM PHOSPHATE AND BETAMETHASONE ACETATE 12 MG: 3; 3 INJECTION, SUSPENSION INTRA-ARTICULAR; INTRALESIONAL; INTRAMUSCULAR at 01:02

## 2018-02-27 RX ADMIN — SODIUM CHLORIDE, SODIUM LACTATE, POTASSIUM CHLORIDE, AND CALCIUM CHLORIDE: .6; .31; .03; .02 INJECTION, SOLUTION INTRAVENOUS at 01:02

## 2018-02-27 RX ADMIN — ASPIRIN 81 MG CHEWABLE TABLET 81 MG: 81 TABLET CHEWABLE at 03:02

## 2018-02-27 RX ADMIN — ACETAMINOPHEN 1000 MG: 10 INJECTION, SOLUTION INTRAVENOUS at 11:02

## 2018-02-27 RX ADMIN — ONDANSETRON 4 MG: 2 INJECTION, SOLUTION INTRAMUSCULAR; INTRAVENOUS at 10:02

## 2018-02-27 RX ADMIN — MAGNESIUM SULFATE IN WATER 2 G: 40 INJECTION, SOLUTION INTRAVENOUS at 01:02

## 2018-02-27 RX ADMIN — LABETALOL HYDROCHLORIDE 20 MG: 5 INJECTION, SOLUTION INTRAVENOUS at 01:02

## 2018-02-27 RX ADMIN — LABETALOL HYDROCHLORIDE 20 MG: 5 INJECTION, SOLUTION INTRAVENOUS at 12:02

## 2018-02-27 RX ADMIN — PHENYLEPHRINE HYDROCHLORIDE 100 MCG: 10 INJECTION INTRAVENOUS at 11:02

## 2018-02-27 RX ADMIN — KETOROLAC TROMETHAMINE 30 MG: 30 INJECTION, SOLUTION INTRAMUSCULAR; INTRAVENOUS at 11:02

## 2018-02-27 RX ADMIN — CEFAZOLIN 3 G: 330 INJECTION, POWDER, FOR SOLUTION INTRAMUSCULAR; INTRAVENOUS at 10:02

## 2018-02-27 NOTE — PROGRESS NOTES
Patient moved to antepartum. S/p 6g Mag sulfate loading dose. Now on Mag at 2g/h. Received labetalol 20/20/40. RN called with severe range pressures, now on Antepartum.     MD to bedside for evaluation. Patient reports mild HA that started as shew as moving down to the antepartum floor. The pain is a dull ache. She denies SOP, vision changes, N/V, RUQ/epigastric pain    Pulse:  [69-87] 70  Resp:  [18] 18  SpO2:  [96 %-99 %] 97 %  BP: (157-176)/() 166/115     PE:   Gen: NAD  CV: RRR  Pulm: CTAB, no crackles appreciated  Abd: nontender to palpation, no RUQ tenderness  Ext: 1+ pitting edema    FHT: reassuring, 120 bpm, moderate BTB variability, + 10x10 accels, no decels  TOCO: no contractions, minimal irritability     Treatment course:   164/107 > 159/108 > 166/115  IV hydralazine 5 mg @ 1458  20 minutes later: 163/84  IV hydralazine 10 mg @ 1535  20 minutes later: 152/85  10 min later: 150/83    Plan:  1. Pregnancy @ 31.2  S/p BMZ dose #1/2 @ 1345  Plan to repeat BMZ   NPO  Mag for neuroprotection/seizure ppx  Continuous fetal monitoring    2. cHTN with ANSHUL with severe features (BP, HA)  BP: (152-176)/() 152/85   Continue home antihypertensive meds: procardia/labetalol    MgSO4 for seizure ppx and neuroprotection  S/p 6g loading dose  Now running @ 2g/h   No s/s of mag toxicity  2 tabs of fioricet ordered: 30 minutes later patient reports resolution of HA  Huang catheter for strict I/Os    Plan and management discussed in real-time with staff M Dr. Inga Dee MD, PhD  OBGYN, PGY-2

## 2018-02-27 NOTE — ASSESSMENT & PLAN NOTE
- CHTN exacerbation vs super imposed PreE with SF(BP)  - Patient with severe range BP in [facility] of 169/98>167/95>167/100>157/94  - s/p 20/20/40 of labetalol  - IV magnesium started 6 g load, 2g/hr  - BMZ given  - continue home labetalol 400mg BID and procardia 30XL  - push hydralazine if needed for severe range BP  - denies preE ROS  - CBC 12/38/217  - CMP AST/ALT 20/23  - P/C Ratio 0.25  - repeat labs in AM  - consents signed  - infant transverse maternal right  - RLTCS if indicated for delivery

## 2018-02-27 NOTE — PROGRESS NOTES
Here for routine OB appt at 31w2d, with no complaints.  BP today is 172/122, u dip + 2 protein.  Denies any HA, BV, or epigastric pain.  Reports she did take her BP meds today. Denies any HA, BV, or epigastric. Reports good FM.  Denies LOF, denies VB, and reports occasional contractions.  Reviewed warning signs of Labor and Preeclampsia.  Daily FM counts reinforced.  Peds- Dr. Suarez.  Plans to breastfeed.   Will start weekly PNT at 32 weeks.   To L&D for r/o Pre E.   F/U scheduled 2 weeks

## 2018-02-27 NOTE — HOSPITAL COURSE
02/27/2018 Patient presented to [facility] with severe range BP of 172/122, repeat /100. Patient given 20/20/40 of labetalol IV push which brought BP down to 157/94. Magnesium started. preE labs collected. [facility] notified. Admitted to antepartum for BP control.   02/28/2018 - POD #1 s/p RLTCS @ 2330, at GA 31w2d for chronic hypertension with SI pre-E. On Mag now for seizure prophylaxis. Doing well, pain controlled, meeting Post-op milestones. Surgery was <12 hours ago, so farias is still in place. BP controlled with oral medications - BP: (103-176)/() 144/91.  03/01/2018 - POD#2 s/p RLTCS. S/p Mag for seizure prophylaxis. Procardia 30XL, 400 labetalol BID. Blood pressures continued to be elevated throughout the day; procardia increased to 60XL to start in the morning. Denies pre-E ROS.  03/02/2018 - POD#3 s/p RLTCS. S/p Mag for seizure prophylaxis. Blood pressure is elevated this morning; will start procardia 60XL today. Otherwise, she is doing well this morning, meeting all postpartum goals (ambulating, pain well-controlled, tolerating reg diet without n/v, passing flatus, voiding spontaneously). She denies headaches, chest pain, SOB, vision changes, or RUQ pain.  03/03/2018 - POD #4. Stable on 90 Procardia XL and 400 mg labetalol BID. No new complaints. Meeting pp raquel rogers, stable for discharge

## 2018-02-27 NOTE — ED PROVIDER NOTES
Encounter Date: 2018       History     Chief Complaint   Patient presents with    Hypertension     Kaylie Fuentes is a 28 y.o. [id number] at 31w2d presents from T with elevated BP of 170s/120s in clinic. Patient denies any Ha, vision changes, RUQ pain, CP or SOB. Patient has CHTN. Prior to pregnancy was on lisinopril 25mg daily. At the beginning of pregnancy she was started on procardia 30XL. She has titrated up to procardia 30XL daily and labetalol 400mg BID. Her previous pregnancy was complicated by preEclampsia with  SF (BP) resulting in a LTCS 2/2 to NFHTS.  This IUP is complicated by uncontrolled CHTN, morbid obesity, h/o of LTCS.  Patient denies contractions, denies vaginal bleeding, denies LOF.   Fetal Movement: normal.            Review of patient's allergies indicates:  No Known Allergies  Past Medical History:   Diagnosis Date    Abnormal Pap smear of cervix     Anemia     Heavy periods     Hypertension     PCOS (polycystic ovarian syndrome)      Past Surgical History:   Procedure Laterality Date     SECTION       Family History   Problem Relation Age of Onset    Breast cancer Mother     Breast cancer Maternal Aunt     Colon cancer Neg Hx     Ovarian cancer Neg Hx      Social History   Substance Use Topics    Smoking status: Former Smoker    Smokeless tobacco: Former User      Comment: quit 7 years ago-    Alcohol use No     Review of Systems   Constitutional: Negative for chills, fatigue and fever.   HENT: Negative for congestion.    Eyes: Negative for visual disturbance.   Respiratory: Negative for cough and shortness of breath.    Cardiovascular: Negative for chest pain and palpitations.   Gastrointestinal: Negative for abdominal distention, abdominal pain, constipation, diarrhea, nausea and vomiting.   Genitourinary: Negative for difficulty urinating, dysuria, hematuria, vaginal bleeding and vaginal discharge.   Skin: Negative for rash.   Neurological: Negative for  dizziness, seizures, light-headedness and headaches.   Hematological: Does not bruise/bleed easily.   Psychiatric/Behavioral: Negative for dysphoric mood. The patient is not nervous/anxious.        Physical Exam     Initial Vitals   BP Pulse Resp Temp SpO2   02/27/18 1219 02/27/18 1215 02/27/18 1219 -- 02/27/18 1215   (!) 165/99 85 18  98 %      MAP       02/27/18 1219       121         Physical Exam    Vitals reviewed.  Constitutional: She appears well-developed and well-nourished. She is not diaphoretic. No distress.   HENT:   Head: Normocephalic and atraumatic.   Eyes: Conjunctivae are normal.   Neck: Neck supple.   Cardiovascular: Normal rate, regular rhythm and normal heart sounds.   Pulmonary/Chest: Breath sounds normal. No respiratory distress.   Abdominal: Soft. She exhibits no distension. There is no tenderness. There is no rebound and no guarding.   Obese, gravid, fundus NT, no RUQ tenderness   Musculoskeletal: She exhibits no edema.   Neurological: She is alert and oriented to person, place, and time. She has normal reflexes. No cranial nerve deficit or sensory deficit.   Skin: Skin is warm and dry. No rash noted. No erythema.   +1 edema bilaterally in lower extremities    Psychiatric: She has a normal mood and affect. Her behavior is normal. Judgment and thought content normal.         ED Course   Obtain Fetal nonstress test (NST)  Date/Time: 2/27/2018 1:03 PM  Performed by: PIERO BORGES  Authorized by: PIERO BORGES     Nonstress Test:     Variability:  6-25 BPM    Decelerations:  None    Accelerations:  10 bpm    Acoustic Stimulator: No      Baseline:  145    Uterine Irritability: No      Contractions:  Not present  Biophysical Profile:     Nonstress Test Interpretation: reactive      Overall Impression:  Reassuring      Labs Reviewed   COMPREHENSIVE METABOLIC PANEL - Abnormal; Notable for the following:        Result Value    CO2 18 (*)     Albumin 2.7 (*)     All other components within normal  limits   CBC W/ AUTO DIFFERENTIAL - Abnormal; Notable for the following:     RDW 14.9 (*)     All other components within normal limits   PROTEIN / CREATININE RATIO, URINE - Abnormal; Notable for the following:     Protein, Urine Random 101 (*)     Creatinine, Random Ur 411.6 (*)     Prot/Creat Ratio, Ur 0.25 (*)     All other components within normal limits             Medical Decision Making:   ED Management:  - Severe range BP in OB ED 160s/110s  - preE labs drawn  - denies preE ROS  - IV access obtained  - patient given 20/20/40 of labetalol in [facility], magnesium started with 6g loading dose, 2g/hr with 1st push of IV labetalol   - consents signed  - scanned transverse, head maternal right  - Admit to M service              Attending Attestation:   Physician Attestation Statement for Resident:  As the supervising MD   Physician Attestation Statement: I have personally seen and examined this patient.   I agree with the above history. -:   As the supervising MD I agree with the above PE.    As the supervising MD I agree with the above treatment, course, plan, and disposition.  I was personally present during the critical portions of the procedure(s) performed by the resident and was immediately available in the ED to provide services and assistance as needed during the entire procedure.  I have reviewed and agree with the residents interpretation of the following: lab data and rhythm strips.  I have reviewed the following: old records at this facility.                    ED Course as of Mar 04 1116   Tue Feb 27, 2018   1234 I evaluated Ms. Fuentes and discussed plan with Dr. Bowen.  Pt presents at 31 weeks w h/o cHTN with severely elevated BP today in clinic.  No s/sx of pre-e.  Fetal status reassuring with baseline at 125.  IV access obtained, will repeat BP, treat as needed.  Pending pre-e workup  [HU]      ED Course User Index  [HU] Rivka Massey DO     Clinical Impression:   The primary encounter diagnosis  was Chronic hypertension with superimposed preeclampsia. Diagnoses of Chronic hypertension with exacerbation during pregnancy in third trimester, Hypertension affecting pregnancy in first trimester,  delivery delivered, Chronic hypertension affecting pregnancy, and 31 weeks gestation of pregnancy were also pertinent to this visit.    Disposition:   Disposition: Admitted  Condition: Jenifer Bowen MD  Resident  18 1349       Rivka Massey, DO  18 1117

## 2018-02-27 NOTE — ANESTHESIA PREPROCEDURE EVALUATION
Kaylie Fuentes is a 28 y.o. female [id number] at 31w2d presented with elevated BP in 170s/120s in clinic.Patient does have chronic HTN. Prior to pregnancy was on lisinopril 25mg daily. At the beginning of pregnancy she was started on procardia 30XL. She has titrated up to procardia 30XL daily and labetalol 400mg BID. Her previous pregnancy was complicated by preEclampsia with  SF (BP) resulting in [facility].  This IUP is complicated by uncontrolled CHTN, morbid obesity, h/o of LTCS. She is admitted for potential PreE.    OB History    Para Term  AB Living   2 1 1     1   SAB TAB Ectopic Multiple Live Births           1      # Outcome Date GA Lbr Aiden/2nd Weight Sex Delivery Anes PTL Lv   2 Current            1 Term 11 39w0d  2.722 kg (6 lb) M CS-LTranv  N KALINA          Wt Readings from Last 1 Encounters:   18 1210 (!) 138.8 kg (306 lb)       BP Readings from Last 3 Encounters:   18 (!) 157/94   18 (!) 172/122   18 125/72       Patient Active Problem List   Diagnosis    PCOS (polycystic ovarian syndrome)    Heavy periods    Hypertension affecting pregnancy in second trimester    Elevated liver enzymes    Influenza B    Chronic hypertension affecting pregnancy    Chronic hypertension with exacerbation during pregnancy in third trimester       Past Surgical History:   Procedure Laterality Date     SECTION         Social History     Social History    Marital status: Single     Spouse name: N/A    Number of children: N/A    Years of education: N/A     Occupational History    Not on file.     Social History Main Topics    Smoking status: Former Smoker    Smokeless tobacco: Former User      Comment: quit 7 years ago-    Alcohol use No    Drug use: No    Sexual activity: Yes     Partners: Male     Other Topics Concern    Not on file     Social History Narrative    No narrative on file         Chemistry        Component Value Date/Time      02/27/2018 1239    K 4.0 02/27/2018 1239     02/27/2018 1239    CO2 18 (L) 02/27/2018 1239    BUN 13 02/27/2018 1239    CREATININE 0.8 02/27/2018 1239    GLU 83 02/27/2018 1239        Component Value Date/Time    CALCIUM 9.2 02/27/2018 1239    ALKPHOS 92 02/27/2018 1239    AST 20 02/27/2018 1239    ALT 23 02/27/2018 1239    BILITOT 0.2 02/27/2018 1239    ESTGFRAFRICA >60 02/27/2018 1239    EGFRNONAA >60 02/27/2018 1239            Lab Results   Component Value Date    WBC 9.76 02/27/2018    HGB 12.7 02/27/2018    HCT 38.4 02/27/2018    MCV 83 02/27/2018     02/27/2018       No results for input(s): PT, INR, PROTIME, APTT in the last 72 hours.                Anesthesia Evaluation    I have reviewed the Patient Summary Reports.     I have reviewed the Medications.     Review of Systems  Anesthesia Hx:  No problems with previous Anesthesia  Denies Family Hx of Anesthesia complications.   Denies Personal Hx of Anesthesia complications.   Social:  Non-Smoker, No Alcohol Use    Hematology/Oncology:  Hematology Normal   Oncology Normal     EENT/Dental:EENT/Dental Normal   Cardiovascular:   Hypertension    Pulmonary:   Denies Asthma.    Renal/:  Renal/ Normal     Hepatic/GI:  Hepatic/GI Normal    Musculoskeletal:  Musculoskeletal Normal    Neurological:   Denies Seizures.    Endocrine:  Endocrine Normal    Psych:  Psychiatric Normal           Physical Exam  General:  Well nourished, Obesity    Airway/Jaw/Neck:  Airway Findings: Mouth Opening: Normal Tongue: Normal  General Airway Assessment: Adult, Average  Mallampati: III  TM Distance: Normal, at least 6 cm  Jaw/Neck Findings:  Neck ROM: Normal ROM      Dental:  Dental Findings: In tact   Chest/Lungs:  Chest/Lungs Clear    Heart/Vascular:  Heart Findings: Normal Heart murmur: negative    Abdomen:  Abdomen Findings: Normal      Mental Status:  Mental Status Findings:  Cooperative, Alert and Oriented         Anesthesia Plan  Type of Anesthesia, risks &  benefits discussed:  Anesthesia Type:  CSE, epidural, general, spinal  Patient's Preference:   Intra-op Monitoring Plan: standard ASA monitors  Intra-op Monitoring Plan Comments:   Post Op Pain Control Plan: per primary service following discharge from PACU, IV/PO Opioids PRN and multimodal analgesia  Post Op Pain Control Plan Comments:   Induction:    Beta Blocker:  Patient is not currently on a Beta-Blocker (No further documentation required).       Informed Consent: Patient understands risks and agrees with Anesthesia plan.  Questions answered. Anesthesia consent signed with patient.  ASA Score: 3     Day of Surgery Review of History & Physical:  There are no significant changes.          Ready For Surgery From Anesthesia Perspective.

## 2018-02-27 NOTE — HPI
Kaylie Fuentes is a 28 y.o. [id number] at 31w2d presents from [facility] with elevated BP of 170s/120s in clinic. Patient denies any Ha, vision changes, RUQ pain, CP or SOB. Patient has CHTN. Prior to pregnancy was on lisinopril 25mg daily. At the beginning of pregnancy she was started on procardia 30XL. She has titrated up to procardia 30XL daily and labetalol 400mg BID. Her previous pregnancy was complicated by preEclampsia with  SF (BP) resulting in a LTCS 2/2 to [facility] at 38wks gestation.  This IUP is complicated by uncontrolled CHTN, morbid obesity, h/o of LTCS.  Patient denies contractions, denies vaginal bleeding, denies LOF. Confirms good fetal movement.

## 2018-02-27 NOTE — SUBJECTIVE & OBJECTIVE
Obstetric HPI:    Obstetric History       T1      L1     SAB0   TAB0   Ectopic0   Multiple0   Live Births1       # Outcome Date GA Lbr Aiden/2nd Weight Sex Delivery Anes PTL Lv   2 Current            1 Term 11 39w0d  2.722 kg (6 lb) M CS-LTranv  N KALINA      Name: Roderick        Past Medical History:   Diagnosis Date    Abnormal Pap smear of cervix     Anemia     Heavy periods     Hypertension     PCOS (polycystic ovarian syndrome)      Past Surgical History:   Procedure Laterality Date     SECTION         PTA Medications   Medication Sig    guaifenesin 100 mg/5 ml (ROBITUSSIN) 100 mg/5 mL syrup Take 200 mg by mouth 3 (three) times daily as needed for Cough.    labetalol (NORMODYNE) 200 MG tablet Take 2 tablets (400 mg total) by mouth 2 (two) times daily.    nifedipine (PROCARDIA-XL) 30 MG (OSM) 24 hr tablet Take 1 tablet (30 mg total) by mouth once daily.    oseltamivir (TAMIFLU) 30 MG capsule Take 75 mg by mouth 2 (two) times daily.    PRENATAL VIT/IRON FUM/FOLIC AC (PRENATAL 1+1 ORAL) Take by mouth.    promethazine (PHENERGAN) 6.25 mg/5 mL syrup Take 25 mg by mouth every 6 (six) hours as needed for Nausea.       Review of patient's allergies indicates:  No Known Allergies     Family History     Problem Relation (Age of Onset)    Breast cancer Mother, Maternal Aunt        Social History Main Topics    Smoking status: Former Smoker    Smokeless tobacco: Former User      Comment: quit 7 years ago-    Alcohol use No    Drug use: No    Sexual activity: Yes     Partners: Male     Review of Systems   Constitutional: Negative for fever.   Respiratory: Negative for cough and shortness of breath.    Cardiovascular: Negative for leg swelling.   Gastrointestinal: Negative for abdominal pain (contractions) and vomiting.   Genitourinary: Negative for vaginal bleeding and vaginal discharge.   Neurological: Negative for headaches.   Hematological: Does not bruise/bleed easily.       Objective:     Vital Signs (Most Recent):  Pulse: 84 (02/27/18 1340)  Resp: 18 (02/27/18 1219)  BP: (!) 157/94 (02/27/18 1339)  SpO2: 98 % (02/27/18 1340) Vital Signs (24h Range):  Pulse:  [72-87] 84  Resp:  [18] 18  SpO2:  [96 %-99 %] 98 %  BP: (157-176)/() 157/94     Weight: (!) 138.8 kg (306 lb)  Body mass index is 43.91 kg/m².    FHT: 130 Cat 2 (reassuring)  TOCO: No contractions     Physical Exam:   Constitutional: She is oriented to person, place, and time. She appears well-developed and well-nourished. No distress.    HENT:   Head: Normocephalic and atraumatic.      Cardiovascular: Normal rate, regular rhythm and normal heart sounds.     Pulmonary/Chest: Effort normal and breath sounds normal.        Abdominal: Soft. Bowel sounds are normal. She exhibits no distension. There is no tenderness.   Obese             Musculoskeletal: Normal range of motion. She exhibits no edema.       Neurological: She is alert and oriented to person, place, and time. She has normal reflexes. She displays normal reflexes. No cranial nerve deficit. Coordination normal.    Skin: Skin is warm and dry.   +1 LE edema bilaterally up to calves.     Psychiatric: She has a normal mood and affect. Her behavior is normal. Judgment and thought content normal.       Cervix: deferred   Presentation: Transverse     Significant Labs:  Lab Results   Component Value Date    GROUPTRH O POS 09/14/2017    HEPBSAG Negative 01/24/2018       CBC:   Recent Labs  Lab 02/27/18  1239   WBC 9.76   RBC 4.63   HGB 12.7   HCT 38.4      MCV 83   MCH 27.4   MCHC 33.1     CMP:   Recent Labs  Lab 02/27/18  1239   GLU 83   CALCIUM 9.2   ALBUMIN 2.7*   PROT 6.8      K 4.0   CO2 18*      BUN 13   CREATININE 0.8   ALKPHOS 92   ALT 23   AST 20   BILITOT 0.2     Vanessa/Creat Ratio:   Recent Labs  Lab 02/27/18  1240   UTPCR 0.25*

## 2018-02-27 NOTE — H&P
[facility]  Obstetrics  History & Physical    Patient Name: Kaylie Fuentes  MRN: [id number]  Admission Date: 2018  Primary Care Provider: Primary Doctor No    Subjective:     Principal Problem:Chronic hypertension with exacerbation during pregnancy in third trimester    History of Present Illness:  Kaylie Fuentes is a 28 y.o. [id number] at 31w2d presents from [facility] with elevated BP of 170s/120s in clinic. Patient denies any Ha, vision changes, RUQ pain, CP or SOB. Patient has CHTN. Prior to pregnancy was on lisinopril 25mg daily. At the beginning of pregnancy she was started on procardia 30XL. She has titrated up to procardia 30XL daily and labetalol 400mg BID. Her previous pregnancy was complicated by preEclampsia with  SF (BP) resulting in a LTCS 2/2 to [facility] at 38wks gestation.  This IUP is complicated by uncontrolled CHTN, morbid obesity, h/o of LTCS.  Patient denies contractions, denies vaginal bleeding, denies LOF. Confirms good fetal movement.     Obstetric HPI:    Obstetric History       T1      L1     SAB0   TAB0   Ectopic0   Multiple0   Live Births1       # Outcome Date GA Lbr Aiden/2nd Weight Sex Delivery Anes PTL Lv   2 Current            1 Term 11 39w0d  2.722 kg (6 lb) M CS-LTranv  N KALINA      Name: Roderick        Past Medical History:   Diagnosis Date    Abnormal Pap smear of cervix     Anemia     Heavy periods     Hypertension     PCOS (polycystic ovarian syndrome)      Past Surgical History:   Procedure Laterality Date     SECTION         PTA Medications   Medication Sig    guaifenesin 100 mg/5 ml (ROBITUSSIN) 100 mg/5 mL syrup Take 200 mg by mouth 3 (three) times daily as needed for Cough.    labetalol (NORMODYNE) 200 MG tablet Take 2 tablets (400 mg total) by mouth 2 (two) times daily.    nifedipine (PROCARDIA-XL) 30 MG (OSM) 24 hr tablet Take 1 tablet (30 mg total) by mouth once daily.    oseltamivir (TAMIFLU) 30 MG capsule Take 75 mg by mouth  2 (two) times daily.    PRENATAL VIT/IRON FUM/FOLIC AC (PRENATAL 1+1 ORAL) Take by mouth.    promethazine (PHENERGAN) 6.25 mg/5 mL syrup Take 25 mg by mouth every 6 (six) hours as needed for Nausea.       Review of patient's allergies indicates:  No Known Allergies     Family History     Problem Relation (Age of Onset)    Breast cancer Mother, Maternal Aunt        Social History Main Topics    Smoking status: Former Smoker    Smokeless tobacco: Former User      Comment: quit 7 years ago-    Alcohol use No    Drug use: No    Sexual activity: Yes     Partners: Male     Review of Systems   Constitutional: Negative for fever.   Respiratory: Negative for cough and shortness of breath.    Cardiovascular: Negative for leg swelling.   Gastrointestinal: Negative for abdominal pain (contractions) and vomiting.   Genitourinary: Negative for vaginal bleeding and vaginal discharge.   Neurological: Negative for headaches.   Hematological: Does not bruise/bleed easily.      Objective:     Vital Signs (Most Recent):  Pulse: 84 (02/27/18 1340)  Resp: 18 (02/27/18 1219)  BP: (!) 157/94 (02/27/18 1339)  SpO2: 98 % (02/27/18 1340) Vital Signs (24h Range):  Pulse:  [72-87] 84  Resp:  [18] 18  SpO2:  [96 %-99 %] 98 %  BP: (157-176)/() 157/94     Weight: (!) 138.8 kg (306 lb)  Body mass index is 43.91 kg/m².    FHT: 130 Cat 2 (reassuring)  TOCO: No contractions     Physical Exam:   Constitutional: She is oriented to person, place, and time. She appears well-developed and well-nourished. No distress.    HENT:   Head: Normocephalic and atraumatic.      Cardiovascular: Normal rate, regular rhythm and normal heart sounds.     Pulmonary/Chest: Effort normal and breath sounds normal.        Abdominal: Soft. Bowel sounds are normal. She exhibits no distension. There is no tenderness.   Obese             Musculoskeletal: Normal range of motion. She exhibits no edema.       Neurological: She is alert and oriented to person, place, and  time. She has normal reflexes. She displays normal reflexes. No cranial nerve deficit. Coordination normal.    Skin: Skin is warm and dry.   +1 LE edema bilaterally up to calves.     Psychiatric: She has a normal mood and affect. Her behavior is normal. Judgment and thought content normal.       Cervix: deferred   Presentation: Transverse     Significant Labs:  Lab Results   Component Value Date    GROUPTRH O POS 2017    HEPBSAG Negative 2018       CBC:   Recent Labs  Lab 18  1239   WBC 9.76   RBC 4.63   HGB 12.7   HCT 38.4      MCV 83   MCH 27.4   MCHC 33.1     CMP:   Recent Labs  Lab 18  1239   GLU 83   CALCIUM 9.2   ALBUMIN 2.7*   PROT 6.8      K 4.0   CO2 18*      BUN 13   CREATININE 0.8   ALKPHOS 92   ALT 23   AST 20   BILITOT 0.2     Vanessa/Creat Ratio:   Recent Labs  Lab 18  1240   UTPCR 0.25*         Assessment/Plan:     28 y.o. female  at 31w2d for:    * Chronic hypertension with exacerbation during pregnancy in third trimester    - CHTN exacerbation vs super imposed PreE with SF(BP)  - Patient with severe range BP in KELLIE of 169/98>167/95>167/100>157/94  - s/p 20//40 of labetalol  - IV magnesium started 6 g load, 2g/hr  - BMZ given  - continue home labetalol 400mg BID and procardia 30XL  - push hydralazine if needed for severe range BP  - denies preE ROS  - CBC   - CMP AST/ALT   - P/C Ratio 0.25  - repeat labs in AM  - consents signed  - infant transverse maternal right  - RLTCS if indicated for delivery         Class 3 severe obesity due to excess calories with body mass index (BMI) of 40.0 to 44.9 in adult    - consider candidate for prevena             Macarena Bowen MD  Obstetrics  [facility]

## 2018-02-28 PROBLEM — O11.9 CHRONIC HYPERTENSION WITH SUPERIMPOSED PREECLAMPSIA: Status: ACTIVE | Noted: 2018-02-28

## 2018-02-28 LAB
ALBUMIN SERPL BCP-MCNC: 2.7 G/DL
ALLENS TEST: ABNORMAL
ALP SERPL-CCNC: 85 U/L
ALT SERPL W/O P-5'-P-CCNC: 22 U/L
ANION GAP SERPL CALC-SCNC: 12 MMOL/L
AST SERPL-CCNC: 20 U/L
BASOPHILS # BLD AUTO: 0 K/UL
BASOPHILS NFR BLD: 0 %
BILIRUB SERPL-MCNC: 0.3 MG/DL
BLOOD GROUP ANTIBODIES SERPL: NORMAL
BUN SERPL-MCNC: 12 MG/DL
CALCIUM SERPL-MCNC: 7.9 MG/DL
CHLORIDE SERPL-SCNC: 101 MMOL/L
CO2 SERPL-SCNC: 19 MMOL/L
CREAT SERPL-MCNC: 0.9 MG/DL
DIFFERENTIAL METHOD: ABNORMAL
EOSINOPHIL # BLD AUTO: 0 K/UL
EOSINOPHIL NFR BLD: 0 %
ERYTHROCYTE [DISTWIDTH] IN BLOOD BY AUTOMATED COUNT: 15.3 %
EST. GFR  (AFRICAN AMERICAN): >60 ML/MIN/1.73 M^2
EST. GFR  (NON AFRICAN AMERICAN): >60 ML/MIN/1.73 M^2
GLUCOSE SERPL-MCNC: 126 MG/DL
HCO3 UR-SCNC: 22.5 MMOL/L (ref 24–28)
HCT VFR BLD AUTO: 38.7 %
HGB BLD-MCNC: 12.8 G/DL
LYMPHOCYTES # BLD AUTO: 1.9 K/UL
LYMPHOCYTES NFR BLD: 10.6 %
MCH RBC QN AUTO: 27.6 PG
MCHC RBC AUTO-ENTMCNC: 33.1 G/DL
MCV RBC AUTO: 84 FL
MONOCYTES # BLD AUTO: 1.2 K/UL
MONOCYTES NFR BLD: 6.4 %
NEUTROPHILS # BLD AUTO: 15 K/UL
NEUTROPHILS NFR BLD: 82.7 %
PCO2 BLDA: 54.7 MMHG (ref 35–45)
PH SMN: 7.22 [PH] (ref 7.35–7.45)
PLATELET # BLD AUTO: 234 K/UL
PMV BLD AUTO: 11.4 FL
PO2 BLDA: 10 MMHG (ref 80–100)
POC BE: -5 MMOL/L
POC SATURATED O2: 8 % (ref 95–100)
POTASSIUM SERPL-SCNC: 4.4 MMOL/L
PROT SERPL-MCNC: 6.8 G/DL
RBC # BLD AUTO: 4.63 M/UL
SAMPLE: ABNORMAL
SITE: ABNORMAL
SODIUM SERPL-SCNC: 132 MMOL/L
WBC # BLD AUTO: 18.15 K/UL

## 2018-02-28 PROCEDURE — 99233 SBSQ HOSP IP/OBS HIGH 50: CPT | Mod: ,,, | Performed by: OBSTETRICS & GYNECOLOGY

## 2018-02-28 PROCEDURE — 25000003 PHARM REV CODE 250: Performed by: STUDENT IN AN ORGANIZED HEALTH CARE EDUCATION/TRAINING PROGRAM

## 2018-02-28 PROCEDURE — 25000003 PHARM REV CODE 250: Performed by: OBSTETRICS & GYNECOLOGY

## 2018-02-28 PROCEDURE — 36415 COLL VENOUS BLD VENIPUNCTURE: CPT

## 2018-02-28 PROCEDURE — 63600175 PHARM REV CODE 636 W HCPCS: Performed by: STUDENT IN AN ORGANIZED HEALTH CARE EDUCATION/TRAINING PROGRAM

## 2018-02-28 PROCEDURE — 11000001 HC ACUTE MED/SURG PRIVATE ROOM

## 2018-02-28 PROCEDURE — 51702 INSERT TEMP BLADDER CATH: CPT

## 2018-02-28 PROCEDURE — 36000685 HC CESAREAN SECTION LEVEL I

## 2018-02-28 PROCEDURE — 88307 TISSUE EXAM BY PATHOLOGIST: CPT | Performed by: PATHOLOGY

## 2018-02-28 PROCEDURE — 88307 TISSUE EXAM BY PATHOLOGIST: CPT | Mod: 26,,, | Performed by: PATHOLOGY

## 2018-02-28 PROCEDURE — 80053 COMPREHEN METABOLIC PANEL: CPT

## 2018-02-28 PROCEDURE — 85025 COMPLETE CBC W/AUTO DIFF WBC: CPT

## 2018-02-28 RX ORDER — OXYCODONE AND ACETAMINOPHEN 10; 325 MG/1; MG/1
1 TABLET ORAL EVERY 4 HOURS PRN
Status: DISCONTINUED | OUTPATIENT
Start: 2018-03-01 | End: 2018-03-03 | Stop reason: HOSPADM

## 2018-02-28 RX ORDER — OXYCODONE HYDROCHLORIDE 5 MG/1
5 TABLET ORAL EVERY 4 HOURS PRN
Status: ACTIVE | OUTPATIENT
Start: 2018-02-28 | End: 2018-03-01

## 2018-02-28 RX ORDER — OXYCODONE HYDROCHLORIDE 5 MG/1
10 TABLET ORAL EVERY 4 HOURS PRN
Status: ACTIVE | OUTPATIENT
Start: 2018-02-28 | End: 2018-03-01

## 2018-02-28 RX ORDER — ACETAMINOPHEN 325 MG/1
650 TABLET ORAL EVERY 6 HOURS
Status: COMPLETED | OUTPATIENT
Start: 2018-02-28 | End: 2018-03-01

## 2018-02-28 RX ORDER — DIPHENHYDRAMINE HCL 25 MG
25 CAPSULE ORAL EVERY 4 HOURS PRN
Status: DISCONTINUED | OUTPATIENT
Start: 2018-02-28 | End: 2018-03-03 | Stop reason: HOSPADM

## 2018-02-28 RX ORDER — NIFEDIPINE 30 MG/1
30 TABLET, EXTENDED RELEASE ORAL DAILY
Status: DISCONTINUED | OUTPATIENT
Start: 2018-02-28 | End: 2018-03-01

## 2018-02-28 RX ORDER — DOCUSATE SODIUM 100 MG/1
200 CAPSULE, LIQUID FILLED ORAL 2 TIMES DAILY
Status: DISCONTINUED | OUTPATIENT
Start: 2018-02-28 | End: 2018-03-03 | Stop reason: HOSPADM

## 2018-02-28 RX ORDER — MORPHINE SULFATE 2 MG/ML
2 INJECTION, SOLUTION INTRAMUSCULAR; INTRAVENOUS
Status: ACTIVE | OUTPATIENT
Start: 2018-02-28 | End: 2018-03-01

## 2018-02-28 RX ORDER — SIMETHICONE 80 MG
1 TABLET,CHEWABLE ORAL EVERY 6 HOURS PRN
Status: DISCONTINUED | OUTPATIENT
Start: 2018-02-28 | End: 2018-03-03 | Stop reason: HOSPADM

## 2018-02-28 RX ORDER — HYDROCORTISONE 25 MG/G
CREAM TOPICAL 3 TIMES DAILY PRN
Status: DISCONTINUED | OUTPATIENT
Start: 2018-02-28 | End: 2018-03-03 | Stop reason: HOSPADM

## 2018-02-28 RX ORDER — OXYCODONE AND ACETAMINOPHEN 5; 325 MG/1; MG/1
1 TABLET ORAL EVERY 4 HOURS PRN
Status: DISCONTINUED | OUTPATIENT
Start: 2018-03-01 | End: 2018-03-03 | Stop reason: HOSPADM

## 2018-02-28 RX ORDER — ACETAMINOPHEN 325 MG/1
650 TABLET ORAL EVERY 6 HOURS
Status: DISCONTINUED | OUTPATIENT
Start: 2018-03-01 | End: 2018-02-28

## 2018-02-28 RX ORDER — AMOXICILLIN 250 MG
1 CAPSULE ORAL NIGHTLY PRN
Status: DISCONTINUED | OUTPATIENT
Start: 2018-02-28 | End: 2018-03-03 | Stop reason: HOSPADM

## 2018-02-28 RX ORDER — MUPIROCIN 20 MG/G
1 OINTMENT TOPICAL 2 TIMES DAILY
Status: DISCONTINUED | OUTPATIENT
Start: 2018-02-28 | End: 2018-03-01

## 2018-02-28 RX ORDER — KETOROLAC TROMETHAMINE 30 MG/ML
30 INJECTION, SOLUTION INTRAMUSCULAR; INTRAVENOUS EVERY 6 HOURS
Status: COMPLETED | OUTPATIENT
Start: 2018-02-28 | End: 2018-02-28

## 2018-02-28 RX ORDER — LABETALOL 200 MG/1
400 TABLET, FILM COATED ORAL 2 TIMES DAILY
Status: DISCONTINUED | OUTPATIENT
Start: 2018-02-28 | End: 2018-03-02

## 2018-02-28 RX ORDER — KETOROLAC TROMETHAMINE 30 MG/ML
30 INJECTION, SOLUTION INTRAMUSCULAR; INTRAVENOUS EVERY 6 HOURS
Status: DISCONTINUED | OUTPATIENT
Start: 2018-03-01 | End: 2018-02-28

## 2018-02-28 RX ORDER — ONDANSETRON 2 MG/ML
4 INJECTION INTRAMUSCULAR; INTRAVENOUS EVERY 6 HOURS PRN
Status: ACTIVE | OUTPATIENT
Start: 2018-02-28 | End: 2018-03-01

## 2018-02-28 RX ORDER — ONDANSETRON 8 MG/1
8 TABLET, ORALLY DISINTEGRATING ORAL EVERY 8 HOURS PRN
Status: DISCONTINUED | OUTPATIENT
Start: 2018-02-28 | End: 2018-03-03 | Stop reason: HOSPADM

## 2018-02-28 RX ADMIN — ACETAMINOPHEN 650 MG: 325 TABLET ORAL at 12:02

## 2018-02-28 RX ADMIN — DOCUSATE SODIUM 200 MG: 100 CAPSULE, LIQUID FILLED ORAL at 09:02

## 2018-02-28 RX ADMIN — MAGNESIUM SULFATE HEPTAHYDRATE 2 G/HR: 40 INJECTION, SOLUTION INTRAVENOUS at 09:02

## 2018-02-28 RX ADMIN — ACETAMINOPHEN 650 MG: 325 TABLET ORAL at 05:02

## 2018-02-28 RX ADMIN — KETOROLAC TROMETHAMINE 30 MG: 30 INJECTION, SOLUTION INTRAMUSCULAR at 12:02

## 2018-02-28 RX ADMIN — LABETALOL HYDROCHLORIDE 400 MG: 200 TABLET, FILM COATED ORAL at 09:02

## 2018-02-28 RX ADMIN — KETOROLAC TROMETHAMINE 30 MG: 30 INJECTION, SOLUTION INTRAMUSCULAR at 07:02

## 2018-02-28 RX ADMIN — ACETAMINOPHEN 650 MG: 325 TABLET ORAL at 07:02

## 2018-02-28 RX ADMIN — NIFEDIPINE 30 MG: 30 TABLET, FILM COATED, EXTENDED RELEASE ORAL at 09:02

## 2018-02-28 RX ADMIN — SIMETHICONE CHEW TAB 80 MG 80 MG: 80 TABLET ORAL at 09:02

## 2018-02-28 RX ADMIN — KETOROLAC TROMETHAMINE 30 MG: 30 INJECTION, SOLUTION INTRAMUSCULAR at 05:02

## 2018-02-28 NOTE — LACTATION NOTE
02/28/18 0855   Maternal Infant Assessment   Breast Density soft;Bilateral:   Areola elastic;Bilateral:   Nipple(s) everted;Bilateral:   Maternal Infant Feeding   Maternal Preparation hand hygiene   Maternal Emotional State assist needed  (with breastpumping)   Comfort Measures Before/During Feeding moist heat to breast(s)  (before breastpumping)   Time Spent (min) 30-60 min   Comfort Measures Following Feeding expressed milk applied  (after breastpumping)   Equipment Type/Education   Pump Type Symphony   Breast Pump Type double electric, hospital grade   Breast Pump Flange Type hard   Breast Pump Flange Size 24 mm   Breast Pumping Bilateral Breasts:;massage pre pumping;pumped until emptied   Lactation Referrals   Lactation Consult Knowledge deficit;Pump teaching   Lactation Interventions   Attachment Promotion counseling provided;infant-mother separation minimized;privacy provided;role responsibility promoted;skin-to-skin contact encouraged  (use breastpump at baby's bedside when she can visit baby)   Breastfeeding Assistance electric breast pump used;milk expression/pumping;support offered   Maternal Breastfeeding Support diary/feeding log utilized;encouragement offered;lactation counseling provided;maternal hydration promoted;maternal nutrition promoted;maternal rest encouraged   Praised patient for her desire to provide her baby with breastmilk; with her permission assisted with use of breastpump; advised on relaxation, imagery and breastpumping techniques to facilitate milk transfer; assisted with hand expression after breastpumping; patient expressed gtts of colostrum which she massaged into her nipples;

## 2018-02-28 NOTE — PROGRESS NOTES
Magnesium Assessment Note    Subjective:         Kaylie Fuentes is a 28 y.o. female POD#1 who delivered at 31w2d via RLTCS. She was started on IV MgSO4 for chronic hypertension with superimposed preeclampsia.    Patient denies headaches, denies blurry vision and denies right upper quadrant pain. Denies CP, denies SOB. Patient reports no nausea/no vomiting, tolerating oral diet.     Objective:      Temp:  [95.5 °F (35.3 °C)-98.2 °F (36.8 °C)] 97.5 °F (36.4 °C)  Pulse:  [] 86  Resp:  [16-18] 16  SpO2:  [93 %-99 %] 95 %  BP: (103-176)/() 146/83    I/O last 3 completed shifts:  In: 1933.3 [I.V.:1933.3]  Out: 3510 [Urine:3070; Blood:440]  I/O this shift:  In: -   Out: 1180 [Urine:1180]    General:   alert, appears stated age and cooperative   Lungs:   clear to auscultation bilaterally   Heart::   regular rate and rhythm, S1, S2 normal, no murmur, click, rub or gallop   Extremities: peripheral pulses normal, no pedal edema, no clubbing or cyanosis   DTRs- 2+  bilaterally     Lab Review  Recent Results (from the past 24 hour(s))   Comprehensive metabolic panel    Collection Time: 02/27/18 12:39 PM   Result Value Ref Range    Sodium 136 136 - 145 mmol/L    Potassium 4.0 3.5 - 5.1 mmol/L    Chloride 105 95 - 110 mmol/L    CO2 18 (L) 23 - 29 mmol/L    Glucose 83 70 - 110 mg/dL    BUN, Bld 13 6 - 20 mg/dL    Creatinine 0.8 0.5 - 1.4 mg/dL    Calcium 9.2 8.7 - 10.5 mg/dL    Total Protein 6.8 6.0 - 8.4 g/dL    Albumin 2.7 (L) 3.5 - 5.2 g/dL    Total Bilirubin 0.2 0.1 - 1.0 mg/dL    Alkaline Phosphatase 92 55 - 135 U/L    AST 20 10 - 40 U/L    ALT 23 10 - 44 U/L    Anion Gap 13 8 - 16 mmol/L    eGFR if African American >60 >60 mL/min/1.73 m^2    eGFR if non African American >60 >60 mL/min/1.73 m^2   CBC auto differential    Collection Time: 02/27/18 12:39 PM   Result Value Ref Range    WBC 9.76 3.90 - 12.70 K/uL    RBC 4.63 4.00 - 5.40 M/uL    Hemoglobin 12.7 12.0 - 16.0 g/dL    Hematocrit 38.4 37.0 - 48.5 %    MCV  83 82 - 98 fL    MCH 27.4 27.0 - 31.0 pg    MCHC 33.1 32.0 - 36.0 g/dL    RDW 14.9 (H) 11.5 - 14.5 %    Platelets 217 150 - 350 K/uL    MPV 12.4 9.2 - 12.9 fL    Gran # (ANC) 6.4 1.8 - 7.7 K/uL    Lymph # 2.2 1.0 - 4.8 K/uL    Mono # 1.0 0.3 - 1.0 K/uL    Eos # 0.2 0.0 - 0.5 K/uL    Baso # 0.01 0.00 - 0.20 K/uL    Gran% 65.8 38.0 - 73.0 %    Lymph% 22.3 18.0 - 48.0 %    Mono% 9.9 4.0 - 15.0 %    Eosinophil% 1.7 0.0 - 8.0 %    Basophil% 0.1 0.0 - 1.9 %    Differential Method Automated    Type & Screen    Collection Time: 02/27/18 12:39 PM   Result Value Ref Range    Group & Rh O POS     Indirect Aisha POS    Antibody identification    Collection Time: 02/27/18 12:39 PM   Result Value Ref Range    Antibody Identification POS    Protein / creatinine ratio, urine    Collection Time: 02/27/18 12:40 PM   Result Value Ref Range    Protein, Urine Random 101 (H) 0 - 15 mg/dL    Creatinine, Random Ur 411.6 (H) 15.0 - 325.0 mg/dL    Prot/Creat Ratio, Ur 0.25 (H) 0.00 - 0.20   Strep B Screen, Vaginal / Rectal    Collection Time: 02/27/18  1:00 PM   Result Value Ref Range    Strep B Culture Further report to follow    Comprehensive metabolic panel    Collection Time: 02/27/18  7:01 PM   Result Value Ref Range    Sodium 133 (L) 136 - 145 mmol/L    Potassium 4.3 3.5 - 5.1 mmol/L    Chloride 103 95 - 110 mmol/L    CO2 17 (L) 23 - 29 mmol/L    Glucose 115 (H) 70 - 110 mg/dL    BUN, Bld 11 6 - 20 mg/dL    Creatinine 0.8 0.5 - 1.4 mg/dL    Calcium 8.9 8.7 - 10.5 mg/dL    Total Protein 7.4 6.0 - 8.4 g/dL    Albumin 3.0 (L) 3.5 - 5.2 g/dL    Total Bilirubin 0.2 0.1 - 1.0 mg/dL    Alkaline Phosphatase 99 55 - 135 U/L    AST 21 10 - 40 U/L    ALT 25 10 - 44 U/L    Anion Gap 13 8 - 16 mmol/L    eGFR if African American >60 >60 mL/min/1.73 m^2    eGFR if non African American >60 >60 mL/min/1.73 m^2   Comprehensive metabolic panel    Collection Time: 02/27/18  7:01 PM   Result Value Ref Range    Sodium 133 (L) 136 - 145 mmol/L    Potassium  4.3 3.5 - 5.1 mmol/L    Chloride 104 95 - 110 mmol/L    CO2 17 (L) 23 - 29 mmol/L    Glucose 115 (H) 70 - 110 mg/dL    BUN, Bld 11 6 - 20 mg/dL    Creatinine 0.8 0.5 - 1.4 mg/dL    Calcium 8.9 8.7 - 10.5 mg/dL    Total Protein 7.4 6.0 - 8.4 g/dL    Albumin 3.0 (L) 3.5 - 5.2 g/dL    Total Bilirubin 0.2 0.1 - 1.0 mg/dL    Alkaline Phosphatase 95 55 - 135 U/L    AST 19 10 - 40 U/L    ALT 22 10 - 44 U/L    Anion Gap 12 8 - 16 mmol/L    eGFR if African American >60 >60 mL/min/1.73 m^2    eGFR if non African American >60 >60 mL/min/1.73 m^2   CBC auto differential    Collection Time: 02/27/18  7:01 PM   Result Value Ref Range    WBC 11.38 3.90 - 12.70 K/uL    RBC 4.98 4.00 - 5.40 M/uL    Hemoglobin 13.8 12.0 - 16.0 g/dL    Hematocrit 41.2 37.0 - 48.5 %    MCV 83 82 - 98 fL    MCH 27.7 27.0 - 31.0 pg    MCHC 33.5 32.0 - 36.0 g/dL    RDW 15.0 (H) 11.5 - 14.5 %    Platelets 259 150 - 350 K/uL    MPV 12.4 9.2 - 12.9 fL    Gran # (ANC) 9.7 (H) 1.8 - 7.7 K/uL    Lymph # 1.5 1.0 - 4.8 K/uL    Mono # 0.2 (L) 0.3 - 1.0 K/uL    Eos # 0.0 0.0 - 0.5 K/uL    Baso # 0.01 0.00 - 0.20 K/uL    Gran% 85.3 (H) 38.0 - 73.0 %    Lymph% 12.7 (L) 18.0 - 48.0 %    Mono% 1.4 (L) 4.0 - 15.0 %    Eosinophil% 0.1 0.0 - 8.0 %    Basophil% 0.1 0.0 - 1.9 %    Differential Method Automated    CBC auto differential    Collection Time: 02/27/18  7:01 PM   Result Value Ref Range    WBC 11.18 3.90 - 12.70 K/uL    RBC 5.01 4.00 - 5.40 M/uL    Hemoglobin 13.7 12.0 - 16.0 g/dL    Hematocrit 41.4 37.0 - 48.5 %    MCV 83 82 - 98 fL    MCH 27.3 27.0 - 31.0 pg    MCHC 33.1 32.0 - 36.0 g/dL    RDW 14.9 (H) 11.5 - 14.5 %    Platelets 237 150 - 350 K/uL    MPV 12.1 9.2 - 12.9 fL    Gran # (ANC) 9.6 (H) 1.8 - 7.7 K/uL    Lymph # 1.4 1.0 - 4.8 K/uL    Mono # 0.2 (L) 0.3 - 1.0 K/uL    Eos # 0.0 0.0 - 0.5 K/uL    Baso # 0.00 0.00 - 0.20 K/uL    Gran% 85.6 (H) 38.0 - 73.0 %    Lymph% 12.7 (L) 18.0 - 48.0 %    Mono% 1.3 (L) 4.0 - 15.0 %    Eosinophil% 0.1 0.0 - 8.0 %     Basophil% 0.0 0.0 - 1.9 %    Differential Method Automated    Lactate dehydrogenase    Collection Time: 02/27/18  7:01 PM   Result Value Ref Range     110 - 260 U/L   Lactate dehydrogenase    Collection Time: 02/27/18  7:01 PM   Result Value Ref Range     110 - 260 U/L   ISTAT PROCEDURE    Collection Time: 02/28/18 12:00 AM   Result Value Ref Range    POC PH 7.223 (L) 7.35 - 7.45    POC PCO2 54.7 (H) 35 - 45 mmHg    POC PO2 10 (L) 80 - 100 mmHg    POC HCO3 22.5 (L) 24 - 28 mmol/L    POC BE -5 -2 to 2 mmol/L    POC SATURATED O2 8 (L) 95 - 100 %    Sample CRD V     Site Other     Allens Test N/A    Comprehensive metabolic panel    Collection Time: 02/28/18  5:07 AM   Result Value Ref Range    Sodium 132 (L) 136 - 145 mmol/L    Potassium 4.4 3.5 - 5.1 mmol/L    Chloride 101 95 - 110 mmol/L    CO2 19 (L) 23 - 29 mmol/L    Glucose 126 (H) 70 - 110 mg/dL    BUN, Bld 12 6 - 20 mg/dL    Creatinine 0.9 0.5 - 1.4 mg/dL    Calcium 7.9 (L) 8.7 - 10.5 mg/dL    Total Protein 6.8 6.0 - 8.4 g/dL    Albumin 2.7 (L) 3.5 - 5.2 g/dL    Total Bilirubin 0.3 0.1 - 1.0 mg/dL    Alkaline Phosphatase 85 55 - 135 U/L    AST 20 10 - 40 U/L    ALT 22 10 - 44 U/L    Anion Gap 12 8 - 16 mmol/L    eGFR if African American >60 >60 mL/min/1.73 m^2    eGFR if non African American >60 >60 mL/min/1.73 m^2   CBC auto differential    Collection Time: 02/28/18  5:08 AM   Result Value Ref Range    WBC 18.15 (H) 3.90 - 12.70 K/uL    RBC 4.63 4.00 - 5.40 M/uL    Hemoglobin 12.8 12.0 - 16.0 g/dL    Hematocrit 38.7 37.0 - 48.5 %    MCV 84 82 - 98 fL    MCH 27.6 27.0 - 31.0 pg    MCHC 33.1 32.0 - 36.0 g/dL    RDW 15.3 (H) 11.5 - 14.5 %    Platelets 234 150 - 350 K/uL    MPV 11.4 9.2 - 12.9 fL    Gran # (ANC) 15.0 (H) 1.8 - 7.7 K/uL    Lymph # 1.9 1.0 - 4.8 K/uL    Mono # 1.2 (H) 0.3 - 1.0 K/uL    Eos # 0.0 0.0 - 0.5 K/uL    Baso # 0.00 0.00 - 0.20 K/uL    Gran% 82.7 (H) 38.0 - 73.0 %    Lymph% 10.6 (L) 18.0 - 48.0 %    Mono% 6.4 4.0 - 15.0 %     Eosinophil% 0.0 0.0 - 8.0 %    Basophil% 0.0 0.0 - 1.9 %    Differential Method Automated         Assessment:     Kaylie Fuentes is a 28 y.o. female POD#1 who delivered at 31w2d via RLTCS. She was started on IV MgSO4 for chronic hypertension with superimposed preeclampsia.    Active Hospital Problems    Diagnosis  POA    *Chronic hypertension with superimposed preeclampsia [O11.9]  Unknown     delivery delivered [O82]  Unknown    Chronic hypertension with exacerbation during pregnancy in third trimester [O10.913]  Yes    Class 3 severe obesity due to excess calories with body mass index (BMI) of 40.0 to 44.9 in adult [E66.01, Z68.41]  Not Applicable      Resolved Hospital Problems    Diagnosis Date Resolved POA   No resolved problems to display.        Plan:     - Continue magnesium sulfate, no signs of toxicity at this time   - Close maternal monitoring   -BP: (103-176)/() 146/83    - UOP:250/hr past 2 hours  - Recheck in 2-4 hours or PRN    Macarena Bowen MD

## 2018-02-28 NOTE — ASSESSMENT & PLAN NOTE
- CHTN with super imposed PreE with SF(BP)  - Patient with severe range BP in KELLIE of 169/98>167/95>167/100>157/94  - s/p 20/20/40 of labetalol  - IV magnesium started 6 g load, 2g/hr, at 2330 2/27/18  - BMZ given  - continued home labetalol 400mg BID and procardia 30XL  - Labs on admission: CBC 12/38/217, CMP AST/ALT 20/23, P/C Ratio 0.25

## 2018-02-28 NOTE — MEDICAL/APP STUDENT
Magnesium Assessment Note    Subjective:         Kaylie Fuentes is a 28 y.o. female POD#1 who delivered at 31w2d via RLTCS. She was started on IV MgSO4 for chronic hypertension with superimposed preeclampsia.    Patient denies headaches, denies blurry vision and denies right upper quadrant pain. Denies CP, denies SOB. Patient reports no nausea/no vomiting, tolerating oral diet.     Objective:      Temp:  [95.5 °F (35.3 °C)-98.2 °F (36.8 °C)] 97.5 °F (36.4 °C)  Pulse:  [] 86  Resp:  [16-18] 16  SpO2:  [93 %-99 %] 95 %  BP: (103-176)/() 146/83    I/O last 3 completed shifts:  In: 1933.3 [I.V.:1933.3]  Out: 3510 [Urine:3070; Blood:440]  I/O this shift:  In: -   Out: 1180 [Urine:1180]    General:   alert, appears stated age and cooperative   Lungs:   clear to auscultation bilaterally   Heart::   regular rate and rhythm, S1, S2 normal, no murmur, click, rub or gallop   Extremities: peripheral pulses normal, no pedal edema, no clubbing or cyanosis   DTRs- 2+  bilaterally     Lab Review  Recent Results (from the past 24 hour(s))   Comprehensive metabolic panel    Collection Time: 02/27/18 12:39 PM   Result Value Ref Range    Sodium 136 136 - 145 mmol/L    Potassium 4.0 3.5 - 5.1 mmol/L    Chloride 105 95 - 110 mmol/L    CO2 18 (L) 23 - 29 mmol/L    Glucose 83 70 - 110 mg/dL    BUN, Bld 13 6 - 20 mg/dL    Creatinine 0.8 0.5 - 1.4 mg/dL    Calcium 9.2 8.7 - 10.5 mg/dL    Total Protein 6.8 6.0 - 8.4 g/dL    Albumin 2.7 (L) 3.5 - 5.2 g/dL    Total Bilirubin 0.2 0.1 - 1.0 mg/dL    Alkaline Phosphatase 92 55 - 135 U/L    AST 20 10 - 40 U/L    ALT 23 10 - 44 U/L    Anion Gap 13 8 - 16 mmol/L    eGFR if African American >60 >60 mL/min/1.73 m^2    eGFR if non African American >60 >60 mL/min/1.73 m^2   CBC auto differential    Collection Time: 02/27/18 12:39 PM   Result Value Ref Range    WBC 9.76 3.90 - 12.70 K/uL    RBC 4.63 4.00 - 5.40 M/uL    Hemoglobin 12.7 12.0 - 16.0 g/dL    Hematocrit 38.4 37.0 - 48.5 %    MCV  83 82 - 98 fL    MCH 27.4 27.0 - 31.0 pg    MCHC 33.1 32.0 - 36.0 g/dL    RDW 14.9 (H) 11.5 - 14.5 %    Platelets 217 150 - 350 K/uL    MPV 12.4 9.2 - 12.9 fL    Gran # (ANC) 6.4 1.8 - 7.7 K/uL    Lymph # 2.2 1.0 - 4.8 K/uL    Mono # 1.0 0.3 - 1.0 K/uL    Eos # 0.2 0.0 - 0.5 K/uL    Baso # 0.01 0.00 - 0.20 K/uL    Gran% 65.8 38.0 - 73.0 %    Lymph% 22.3 18.0 - 48.0 %    Mono% 9.9 4.0 - 15.0 %    Eosinophil% 1.7 0.0 - 8.0 %    Basophil% 0.1 0.0 - 1.9 %    Differential Method Automated    Type & Screen    Collection Time: 02/27/18 12:39 PM   Result Value Ref Range    Group & Rh O POS     Indirect Aisha POS    Antibody identification    Collection Time: 02/27/18 12:39 PM   Result Value Ref Range    Antibody Identification POS    Protein / creatinine ratio, urine    Collection Time: 02/27/18 12:40 PM   Result Value Ref Range    Protein, Urine Random 101 (H) 0 - 15 mg/dL    Creatinine, Random Ur 411.6 (H) 15.0 - 325.0 mg/dL    Prot/Creat Ratio, Ur 0.25 (H) 0.00 - 0.20   Strep B Screen, Vaginal / Rectal    Collection Time: 02/27/18  1:00 PM   Result Value Ref Range    Strep B Culture Further report to follow    Comprehensive metabolic panel    Collection Time: 02/27/18  7:01 PM   Result Value Ref Range    Sodium 133 (L) 136 - 145 mmol/L    Potassium 4.3 3.5 - 5.1 mmol/L    Chloride 103 95 - 110 mmol/L    CO2 17 (L) 23 - 29 mmol/L    Glucose 115 (H) 70 - 110 mg/dL    BUN, Bld 11 6 - 20 mg/dL    Creatinine 0.8 0.5 - 1.4 mg/dL    Calcium 8.9 8.7 - 10.5 mg/dL    Total Protein 7.4 6.0 - 8.4 g/dL    Albumin 3.0 (L) 3.5 - 5.2 g/dL    Total Bilirubin 0.2 0.1 - 1.0 mg/dL    Alkaline Phosphatase 99 55 - 135 U/L    AST 21 10 - 40 U/L    ALT 25 10 - 44 U/L    Anion Gap 13 8 - 16 mmol/L    eGFR if African American >60 >60 mL/min/1.73 m^2    eGFR if non African American >60 >60 mL/min/1.73 m^2   Comprehensive metabolic panel    Collection Time: 02/27/18  7:01 PM   Result Value Ref Range    Sodium 133 (L) 136 - 145 mmol/L    Potassium  4.3 3.5 - 5.1 mmol/L    Chloride 104 95 - 110 mmol/L    CO2 17 (L) 23 - 29 mmol/L    Glucose 115 (H) 70 - 110 mg/dL    BUN, Bld 11 6 - 20 mg/dL    Creatinine 0.8 0.5 - 1.4 mg/dL    Calcium 8.9 8.7 - 10.5 mg/dL    Total Protein 7.4 6.0 - 8.4 g/dL    Albumin 3.0 (L) 3.5 - 5.2 g/dL    Total Bilirubin 0.2 0.1 - 1.0 mg/dL    Alkaline Phosphatase 95 55 - 135 U/L    AST 19 10 - 40 U/L    ALT 22 10 - 44 U/L    Anion Gap 12 8 - 16 mmol/L    eGFR if African American >60 >60 mL/min/1.73 m^2    eGFR if non African American >60 >60 mL/min/1.73 m^2   CBC auto differential    Collection Time: 02/27/18  7:01 PM   Result Value Ref Range    WBC 11.38 3.90 - 12.70 K/uL    RBC 4.98 4.00 - 5.40 M/uL    Hemoglobin 13.8 12.0 - 16.0 g/dL    Hematocrit 41.2 37.0 - 48.5 %    MCV 83 82 - 98 fL    MCH 27.7 27.0 - 31.0 pg    MCHC 33.5 32.0 - 36.0 g/dL    RDW 15.0 (H) 11.5 - 14.5 %    Platelets 259 150 - 350 K/uL    MPV 12.4 9.2 - 12.9 fL    Gran # (ANC) 9.7 (H) 1.8 - 7.7 K/uL    Lymph # 1.5 1.0 - 4.8 K/uL    Mono # 0.2 (L) 0.3 - 1.0 K/uL    Eos # 0.0 0.0 - 0.5 K/uL    Baso # 0.01 0.00 - 0.20 K/uL    Gran% 85.3 (H) 38.0 - 73.0 %    Lymph% 12.7 (L) 18.0 - 48.0 %    Mono% 1.4 (L) 4.0 - 15.0 %    Eosinophil% 0.1 0.0 - 8.0 %    Basophil% 0.1 0.0 - 1.9 %    Differential Method Automated    CBC auto differential    Collection Time: 02/27/18  7:01 PM   Result Value Ref Range    WBC 11.18 3.90 - 12.70 K/uL    RBC 5.01 4.00 - 5.40 M/uL    Hemoglobin 13.7 12.0 - 16.0 g/dL    Hematocrit 41.4 37.0 - 48.5 %    MCV 83 82 - 98 fL    MCH 27.3 27.0 - 31.0 pg    MCHC 33.1 32.0 - 36.0 g/dL    RDW 14.9 (H) 11.5 - 14.5 %    Platelets 237 150 - 350 K/uL    MPV 12.1 9.2 - 12.9 fL    Gran # (ANC) 9.6 (H) 1.8 - 7.7 K/uL    Lymph # 1.4 1.0 - 4.8 K/uL    Mono # 0.2 (L) 0.3 - 1.0 K/uL    Eos # 0.0 0.0 - 0.5 K/uL    Baso # 0.00 0.00 - 0.20 K/uL    Gran% 85.6 (H) 38.0 - 73.0 %    Lymph% 12.7 (L) 18.0 - 48.0 %    Mono% 1.3 (L) 4.0 - 15.0 %    Eosinophil% 0.1 0.0 - 8.0 %     Basophil% 0.0 0.0 - 1.9 %    Differential Method Automated    Lactate dehydrogenase    Collection Time: 02/27/18  7:01 PM   Result Value Ref Range     110 - 260 U/L   Lactate dehydrogenase    Collection Time: 02/27/18  7:01 PM   Result Value Ref Range     110 - 260 U/L   ISTAT PROCEDURE    Collection Time: 02/28/18 12:00 AM   Result Value Ref Range    POC PH 7.223 (L) 7.35 - 7.45    POC PCO2 54.7 (H) 35 - 45 mmHg    POC PO2 10 (L) 80 - 100 mmHg    POC HCO3 22.5 (L) 24 - 28 mmol/L    POC BE -5 -2 to 2 mmol/L    POC SATURATED O2 8 (L) 95 - 100 %    Sample CRD V     Site Other     Allens Test N/A    Comprehensive metabolic panel    Collection Time: 02/28/18  5:07 AM   Result Value Ref Range    Sodium 132 (L) 136 - 145 mmol/L    Potassium 4.4 3.5 - 5.1 mmol/L    Chloride 101 95 - 110 mmol/L    CO2 19 (L) 23 - 29 mmol/L    Glucose 126 (H) 70 - 110 mg/dL    BUN, Bld 12 6 - 20 mg/dL    Creatinine 0.9 0.5 - 1.4 mg/dL    Calcium 7.9 (L) 8.7 - 10.5 mg/dL    Total Protein 6.8 6.0 - 8.4 g/dL    Albumin 2.7 (L) 3.5 - 5.2 g/dL    Total Bilirubin 0.3 0.1 - 1.0 mg/dL    Alkaline Phosphatase 85 55 - 135 U/L    AST 20 10 - 40 U/L    ALT 22 10 - 44 U/L    Anion Gap 12 8 - 16 mmol/L    eGFR if African American >60 >60 mL/min/1.73 m^2    eGFR if non African American >60 >60 mL/min/1.73 m^2   CBC auto differential    Collection Time: 02/28/18  5:08 AM   Result Value Ref Range    WBC 18.15 (H) 3.90 - 12.70 K/uL    RBC 4.63 4.00 - 5.40 M/uL    Hemoglobin 12.8 12.0 - 16.0 g/dL    Hematocrit 38.7 37.0 - 48.5 %    MCV 84 82 - 98 fL    MCH 27.6 27.0 - 31.0 pg    MCHC 33.1 32.0 - 36.0 g/dL    RDW 15.3 (H) 11.5 - 14.5 %    Platelets 234 150 - 350 K/uL    MPV 11.4 9.2 - 12.9 fL    Gran # (ANC) 15.0 (H) 1.8 - 7.7 K/uL    Lymph # 1.9 1.0 - 4.8 K/uL    Mono # 1.2 (H) 0.3 - 1.0 K/uL    Eos # 0.0 0.0 - 0.5 K/uL    Baso # 0.00 0.00 - 0.20 K/uL    Gran% 82.7 (H) 38.0 - 73.0 %    Lymph% 10.6 (L) 18.0 - 48.0 %    Mono% 6.4 4.0 - 15.0 %     Eosinophil% 0.0 0.0 - 8.0 %    Basophil% 0.0 0.0 - 1.9 %    Differential Method Automated         Assessment:     Kaylie Fuentes is a 28 y.o. female POD#1 who delivered at 31w2d via RLTCS. She was started on IV MgSO4 for chronic hypertension with superimposed preeclampsia.    Active Hospital Problems    Diagnosis  POA    *Chronic hypertension with superimposed preeclampsia [O11.9]  Unknown     delivery delivered [O82]  Unknown    Chronic hypertension with exacerbation during pregnancy in third trimester [O10.913]  Yes    Class 3 severe obesity due to excess calories with body mass index (BMI) of 40.0 to 44.9 in adult [E66.01, Z68.41]  Not Applicable      Resolved Hospital Problems    Diagnosis Date Resolved POA   No resolved problems to display.        Plan:     - Continue magnesium sulfate, no signs of toxicity at this time   - Close maternal monitoring   -  BP: (103-176)/() 146/83    - UOP: ~2.16 cc/kg/hr  - Recheck in 2-4 hours or PRN    Diane Johnson, MS4

## 2018-02-28 NOTE — PROGRESS NOTES
At bedside to evaluate patient secondary to continued elevated BP.    Patient reports mild headache, recently received compazine. Patient denies scotoma, RUQ pain, N/V, CP, SOB. Denies contractions, LOF, vaginal bleeding. Reports active FM.     Temp:  [97.9 °F (36.6 °C)-98.2 °F (36.8 °C)] 97.9 °F (36.6 °C)  Pulse:  [69-97] 93  Resp:  [18] 18  SpO2:  [96 %-99 %] 98 %  BP: (152-176)/() 170/102    Physical Exam:   General: alert and oriented, NAD   CV: regular rate and rhythm   Pulm: CTA bilaterally   ABD: soft, nontender, +BS, gravid  Ext: No edema  SVE: def    FHT: 120bpm, mod BTBV, +accels, no decels  [place]: none    A/P: 29yo  at 31w2d with CHTN with SI PreE w/SF (BP, headache)  - On MgSO4 for seizure ppx  - s/p labetalol 20/20/40, hydralazine /10 then 1-2h later 5/10  - Awaiting repeat pressure after most recent dose of hydralazine 10mg IV  - D/W Dr. Xiong, given continued elevated BP and headache despite multiple IV antihypertensive doses and po meds for headache, patient remains severe range with symptoms, recommend RLTCS tonight  - Notified charge and anesthesia  - Case request placed    Alison Evans M.D.  PGY-4 OB/GYN

## 2018-02-28 NOTE — PROGRESS NOTES
Magnesium Assessment Note    Subjective:         Kaylie Fuentes is a 28 y.o. female POD#1 who delivered at 31w2d via RLTCS. She was started on IV MgSO4 for chronic hypertension with superimposed preeclampsia.    Patient denies headaches, denies blurry vision and denies right upper quadrant pain. Denies CP, denies SOB. Patient reports no nausea/no vomiting, tolerating oral diet.     Objective:      Temp:  [95.5 °F (35.3 °C)-98.2 °F (36.8 °C)] 98 °F (36.7 °C)  Pulse:  [] 82  Resp:  [16-18] 18  SpO2:  [93 %-99 %] 98 %  BP: (103-172)/() 156/87    I/O last 3 completed shifts:  In: 1933.3 [I.V.:1933.3]  Out: 3510 [Urine:3070; Blood:440]  I/O this shift:  In: -   Out: 1180 [Urine:1180]    General:   alert, appears stated age and cooperative   Lungs:   clear to auscultation bilaterally   Heart::   regular rate and rhythm, S1, S2 normal, no murmur, click, rub or gallop   Extremities: peripheral pulses normal, no pedal edema, no clubbing or cyanosis   DTRs- 2+  bilaterally     Lab Review  Recent Results (from the past 24 hour(s))   Comprehensive metabolic panel    Collection Time: 02/27/18  7:01 PM   Result Value Ref Range    Sodium 133 (L) 136 - 145 mmol/L    Potassium 4.3 3.5 - 5.1 mmol/L    Chloride 103 95 - 110 mmol/L    CO2 17 (L) 23 - 29 mmol/L    Glucose 115 (H) 70 - 110 mg/dL    BUN, Bld 11 6 - 20 mg/dL    Creatinine 0.8 0.5 - 1.4 mg/dL    Calcium 8.9 8.7 - 10.5 mg/dL    Total Protein 7.4 6.0 - 8.4 g/dL    Albumin 3.0 (L) 3.5 - 5.2 g/dL    Total Bilirubin 0.2 0.1 - 1.0 mg/dL    Alkaline Phosphatase 99 55 - 135 U/L    AST 21 10 - 40 U/L    ALT 25 10 - 44 U/L    Anion Gap 13 8 - 16 mmol/L    eGFR if African American >60 >60 mL/min/1.73 m^2    eGFR if non African American >60 >60 mL/min/1.73 m^2   Comprehensive metabolic panel    Collection Time: 02/27/18  7:01 PM   Result Value Ref Range    Sodium 133 (L) 136 - 145 mmol/L    Potassium 4.3 3.5 - 5.1 mmol/L    Chloride 104 95 - 110 mmol/L    CO2 17 (L) 23 -  29 mmol/L    Glucose 115 (H) 70 - 110 mg/dL    BUN, Bld 11 6 - 20 mg/dL    Creatinine 0.8 0.5 - 1.4 mg/dL    Calcium 8.9 8.7 - 10.5 mg/dL    Total Protein 7.4 6.0 - 8.4 g/dL    Albumin 3.0 (L) 3.5 - 5.2 g/dL    Total Bilirubin 0.2 0.1 - 1.0 mg/dL    Alkaline Phosphatase 95 55 - 135 U/L    AST 19 10 - 40 U/L    ALT 22 10 - 44 U/L    Anion Gap 12 8 - 16 mmol/L    eGFR if African American >60 >60 mL/min/1.73 m^2    eGFR if non African American >60 >60 mL/min/1.73 m^2   CBC auto differential    Collection Time: 02/27/18  7:01 PM   Result Value Ref Range    WBC 11.38 3.90 - 12.70 K/uL    RBC 4.98 4.00 - 5.40 M/uL    Hemoglobin 13.8 12.0 - 16.0 g/dL    Hematocrit 41.2 37.0 - 48.5 %    MCV 83 82 - 98 fL    MCH 27.7 27.0 - 31.0 pg    MCHC 33.5 32.0 - 36.0 g/dL    RDW 15.0 (H) 11.5 - 14.5 %    Platelets 259 150 - 350 K/uL    MPV 12.4 9.2 - 12.9 fL    Gran # (ANC) 9.7 (H) 1.8 - 7.7 K/uL    Lymph # 1.5 1.0 - 4.8 K/uL    Mono # 0.2 (L) 0.3 - 1.0 K/uL    Eos # 0.0 0.0 - 0.5 K/uL    Baso # 0.01 0.00 - 0.20 K/uL    Gran% 85.3 (H) 38.0 - 73.0 %    Lymph% 12.7 (L) 18.0 - 48.0 %    Mono% 1.4 (L) 4.0 - 15.0 %    Eosinophil% 0.1 0.0 - 8.0 %    Basophil% 0.1 0.0 - 1.9 %    Differential Method Automated    CBC auto differential    Collection Time: 02/27/18  7:01 PM   Result Value Ref Range    WBC 11.18 3.90 - 12.70 K/uL    RBC 5.01 4.00 - 5.40 M/uL    Hemoglobin 13.7 12.0 - 16.0 g/dL    Hematocrit 41.4 37.0 - 48.5 %    MCV 83 82 - 98 fL    MCH 27.3 27.0 - 31.0 pg    MCHC 33.1 32.0 - 36.0 g/dL    RDW 14.9 (H) 11.5 - 14.5 %    Platelets 237 150 - 350 K/uL    MPV 12.1 9.2 - 12.9 fL    Gran # (ANC) 9.6 (H) 1.8 - 7.7 K/uL    Lymph # 1.4 1.0 - 4.8 K/uL    Mono # 0.2 (L) 0.3 - 1.0 K/uL    Eos # 0.0 0.0 - 0.5 K/uL    Baso # 0.00 0.00 - 0.20 K/uL    Gran% 85.6 (H) 38.0 - 73.0 %    Lymph% 12.7 (L) 18.0 - 48.0 %    Mono% 1.3 (L) 4.0 - 15.0 %    Eosinophil% 0.1 0.0 - 8.0 %    Basophil% 0.0 0.0 - 1.9 %    Differential Method Automated    Lactate  dehydrogenase    Collection Time: 02/27/18  7:01 PM   Result Value Ref Range     110 - 260 U/L   Lactate dehydrogenase    Collection Time: 02/27/18  7:01 PM   Result Value Ref Range     110 - 260 U/L   ISTAT PROCEDURE    Collection Time: 02/28/18 12:00 AM   Result Value Ref Range    POC PH 7.223 (L) 7.35 - 7.45    POC PCO2 54.7 (H) 35 - 45 mmHg    POC PO2 10 (L) 80 - 100 mmHg    POC HCO3 22.5 (L) 24 - 28 mmol/L    POC BE -5 -2 to 2 mmol/L    POC SATURATED O2 8 (L) 95 - 100 %    Sample CRD V     Site Other     Allens Test N/A    Comprehensive metabolic panel    Collection Time: 02/28/18  5:07 AM   Result Value Ref Range    Sodium 132 (L) 136 - 145 mmol/L    Potassium 4.4 3.5 - 5.1 mmol/L    Chloride 101 95 - 110 mmol/L    CO2 19 (L) 23 - 29 mmol/L    Glucose 126 (H) 70 - 110 mg/dL    BUN, Bld 12 6 - 20 mg/dL    Creatinine 0.9 0.5 - 1.4 mg/dL    Calcium 7.9 (L) 8.7 - 10.5 mg/dL    Total Protein 6.8 6.0 - 8.4 g/dL    Albumin 2.7 (L) 3.5 - 5.2 g/dL    Total Bilirubin 0.3 0.1 - 1.0 mg/dL    Alkaline Phosphatase 85 55 - 135 U/L    AST 20 10 - 40 U/L    ALT 22 10 - 44 U/L    Anion Gap 12 8 - 16 mmol/L    eGFR if African American >60 >60 mL/min/1.73 m^2    eGFR if non African American >60 >60 mL/min/1.73 m^2   CBC auto differential    Collection Time: 02/28/18  5:08 AM   Result Value Ref Range    WBC 18.15 (H) 3.90 - 12.70 K/uL    RBC 4.63 4.00 - 5.40 M/uL    Hemoglobin 12.8 12.0 - 16.0 g/dL    Hematocrit 38.7 37.0 - 48.5 %    MCV 84 82 - 98 fL    MCH 27.6 27.0 - 31.0 pg    MCHC 33.1 32.0 - 36.0 g/dL    RDW 15.3 (H) 11.5 - 14.5 %    Platelets 234 150 - 350 K/uL    MPV 11.4 9.2 - 12.9 fL    Gran # (ANC) 15.0 (H) 1.8 - 7.7 K/uL    Lymph # 1.9 1.0 - 4.8 K/uL    Mono # 1.2 (H) 0.3 - 1.0 K/uL    Eos # 0.0 0.0 - 0.5 K/uL    Baso # 0.00 0.00 - 0.20 K/uL    Gran% 82.7 (H) 38.0 - 73.0 %    Lymph% 10.6 (L) 18.0 - 48.0 %    Mono% 6.4 4.0 - 15.0 %    Eosinophil% 0.0 0.0 - 8.0 %    Basophil% 0.0 0.0 - 1.9 %    Differential  Method Automated         Assessment:     Kaylie Fuentes is a 28 y.o. female POD#1 who delivered at 31w2d via RLTCS. She was started on IV MgSO4 for chronic hypertension with superimposed preeclampsia.    Active Hospital Problems    Diagnosis  POA    *Chronic hypertension with superimposed preeclampsia [O11.9]  Unknown     delivery delivered [O82]  Unknown    Chronic hypertension with exacerbation during pregnancy in third trimester [O10.913]  Yes    Class 3 severe obesity due to excess calories with body mass index (BMI) of 40.0 to 44.9 in adult [E66.01, Z68.41]  Not Applicable      Resolved Hospital Problems    Diagnosis Date Resolved POA   No resolved problems to display.        Plan:     - Continue magnesium sulfate, no signs of toxicity at this time   - Close maternal monitoring   - BP: (103-172)/() 156/87    - UOP: 125 cc in past 2 hours. Was 300/hr for the two hours prior. Will continue to monitor.  - Recheck in 2-4 hours or PRN    Sushma K Reddy, MD

## 2018-02-28 NOTE — L&D DELIVERY NOTE
[facility]   Section   Operative Note    SUMMARY      Section Operative Note  Procedure Date: 2018    Procedure: repeat low transverse  section    Indications:   1. cHTN w SI preE w SF  2. History of  section x 1  3. Transverse fetal lie    Pre-operative Diagnosis: IUP at 31 week 2 day pregnancy    Post-operative Diagnosis:   1. cHTN w SI preE w SF  2. S/p repeat  section    Surgeon: Dr. Smith Suarez MD     Assistants:   Alison Evans MD PGY4  Marin Pratt MD PGY1    Anesthesia: Epidural anesthesia and Spinal anesthesia    Estimated Blood Loss:  450 mL           Total IV Fluids: 700 mL     UOP: 150 mL    Specimens: single viable male infant , placenta which was sent to pathology    PreOp CBC:   Lab Results   Component Value Date    WBC 18.15 (H) 2018    HGB 12.8 2018    HCT 38.7 2018    MCV 84 2018     2018                     Complications:  None; patient tolerated the procedure well.           Disposition: PACU - hemodynamically stable.           Condition: stable    Procedure Details   The patient was seen in her antepartum room. The risks, benefits, complications, treatment options, and expected outcomes were discussed with the patient.  The patient concurred with the proposed plan, giving informed consent. The patient was taken to Operating Room, identified as Kaylie Fuentes and the procedure verified as Repeat Low Transverse  Delivery. A time out was held and the above information confirmed.    After induction of anesthesia, the patient was prepped and draped in the usual sterile manner while placed in a dorsal supine position with a left lateral tilt.  A farias catheter was also placed per nursing. Preoperative antibiotics were administered and an allis test was performed yielding adequate anesthesia.  A Pfannenstiel incision was made and carried down through the subcutaneous tissue to the  fascia. Fascial incision was made and extended transversely. The fascia was grasped with Kocher clamps and  from the underlying rectus tissue superiorly and inferiorly. The rectus muscles were  using Allis clamps and a scalpel. The peritoneum was identified, found to be free of adherent bowel and entered. The omentum was found to be densely adhered to the peritoneum and was incised. Peritoneal incision was extended longitudinally. The bladder blade was inserted to keep the bladder out of the operative field. A low transverse uterine incision was made with knife and extended with finger fracture. The amniotic sac was ruptured and the infant was noted to be in transverse position. The infant was rotated so the buttocks could be elevated out of the abdominal cavity. The lower extremities were externally rotated to remove them from the uterus. The infant was delivered to the level of the scapulae. Each arm was extended out of the abdominal cavity. The head was held in flexion as the patient delivered a single viable male infant without difficulty.  Infant weighed 1260 grams with Apgar scores of 3/4 at one and five minutes respectively. At ten minutes, the Apgar score was 8. After the umbilical cord was clamped and cut cord blood was obtained for evaluation. The placenta was removed intact and appeared normal and was sent to pathology. The uterus was exteriorized. The uterine outline, tubes and ovaries appeared normal. The uterine incision was closed with running locked sutures of #1 chromic. Hemostasis was observed. The uterus was returned to the abdominal cavity. Incision was reinspected and good hemostasis was noted. The abdominal cavity was irrigated to remove clots. The fascia was then reapproximated with running sutures of #1 PDS. The subcutaneous fat and skin was reapproximated with 2-0 Vicryl and 4-0 monocryl respectively.    Instrument, sponge, and needle counts were correct prior the abdominal  closure and at the conclusion of the case.     Pt tolerated procedure well and Dr. Suarez discussed with family that pt was stable and in good condition after the procedure.         Delivery Information for  Rui Lott Foster    Birth information:  YOB: 2018   Time of birth: 11:10 PM   Sex: male   Head Delivery Date/Time: 2018 11:10 PM   Delivery type: , Low Transverse   Gestational Age: 31w2d    Delivery Providers    Delivering clinician:  TABITHA Suarez MD   Provider Role    Marin Pratt MD Resident    Lashonda Garcia RN Delivery Nurse    [facility]            Campus Measurements    Weight:  1260 g         Campus Assessment    Living status:  Living  Apgars:     1 Minute:   5 Minute:   10 Minute:   15 Minute:   20 Minute:     Skin Color:   0  0  2      Heart Rate:   1  2  2      Reflex Irritability:   0  1  1      Muscle Tone:   1  0  1      Respiratory Effort:   1  1  2      Total:   3  4  8              Apgars Assigned By:  NICU         Assisted Delivery Details:    Forceps attempted?:  No  Vacuum extractor attempted?:  No         Shoulder Dystocia    Shoulder dystocia present?:  No           Presentation and Position    Presentation:  Tyrese Breech  Position:  Right Occiput Transverse           Interventions/Resuscitation    Method:  NICU Attended       Cord    Vessels:  3 vessels  Complications:  None  Delayed Cord Clamping?:  No  Cord Clamped Date/Time:  2018 11:10 PM  Cord Blood Disposition:  Sent with Baby  Gases Sent?:  Yes  Stem Cell Collection (by MD):  No       Placenta    Date and time:  2018 11:09 PM  Removal:  Manual removal  Appearance:  Intact  Placenta disposition:  pathology           Labor Events:       labor:       Labor Onset Date/Time:         Dilation Complete Date/Time:         Start Pushing Date/Time:       Rupture Date/Time:              Rupture type:           Fluid Amount: moderate      Fluid Color: clear       Fluid Odor: none      Membrane Status (PeriCalm):        Rupture Date/Time (PeriCalm):        Fluid Amount (PeriCalm):        Fluid Color (PeriCalm):         steroids:       Antibiotics given for GBS:       Induction:       Indications for induction:        Augmentation:       Indications for augmentation:       Labor complications: None     Additional complications:          Cervical ripening:                     Delivery:      Episiotomy:       Indication for Episiotomy:       Perineal Lacerations:   Repaired:      Periurethral Laceration:   Repaired:     Labial Laceration:   Repaired:     Sulcus Laceration:   Repaired:     Vaginal Laceration:   Repaired:     Cervical Laceration:   Repaired:     Repair suture:       Repair # of packets:       Vaginal delivery QBL (mL): 0      QBL (mL): 440     Combined Blood Loss (mL): 440     Vaginal Sweep Performed:       Surgicount Correct:         Other providers:       Anesthesia    Method:  Epidural, Spinal          Details (if applicable):  Trial of Labor No    Categorization: Repeat    Priority: Routine   Indications for : Other (Add Comments);Repeat Section   Incision Type: low transverse     Additional  information:  Forceps:    Vacuum:    Breech:    Observed anomalies    Other (Comments):         Marin Pratt MD  PGY1, OBGYN [facility]

## 2018-02-28 NOTE — PLAN OF CARE
COPIED FROM INFANT'S CHART    SOCIAL WORK DISCHARGE PLANNING ASSESSMENT     Sw completed discharge planning assessment with pt's mother in mother's room 395.  Pt's mother was easily engaged. Education on the role of  was provided. Emotional support provided throughout assessment.        Legal Name: Gurpreet Adler VI             :  2018         Patient Active Problem List   Diagnosis    Prematurity, 1,250-1,499 grams, 31-32 completed weeks    Acute respiratory distress in  with surfactant disorder    Need for observation and evaluation of  for sepsis    Hypoglycemia,             [facility]irth [facility]           ANDERSON: 19     Birth Weight:   1.26 kg (2 lb 12.4 oz)              Birth Length: 38.0cm               Gestational Age: 31w2d           [place] Assessment    Living status:  Living  Apgars:      1 Minute:   5 Minute:   10 Minute:   15 Minute:   20 Minute:     Skin Color:   0  0  2        Heart Rate:   1  2  2        Reflex Irritability:   0  1  1        Muscle Tone:   1  0  1        Respiratory Effort:   1  1  2        Total:   3  4  8                      Apgars Assigned By:  NICU            Mother: Kaylie Fuentes, age 28,  1990  Address: [address] [place] [address]  Phone: [phone]  Employer: [employer]                Job Title: Line Tech  Education: some high school        Father: Gurpreet Adler, age 38,  1979  Address: [address] [place] [address]  Phone: [phone]  Employer: [employer]  Job Title: Line Tech  Education:  high school diploma  Signed Birth Certificate: Yes; parents are engaged     Support person(s): Nita Fuentes (mg) [phone] and Cristina Jaquez (pg) [phone]     Sibling(s): Roderick (age 6)     Spiritual Affiliation: Yes  [employer]     Commercial Insurance Coverage: No      [facility] Health Plan (formerly [place] Medicaid): Primary: Yes Secondary: No   [facility]       Pediatrician: Erick Godoy MD ([facility])       Nutrition: Expressed Breast Milk               Breast Pump:              No               Plans to obtain from [facility]               WIC:              Mom not certified; however will apply for         Essential Items: (includes car seat, crib/bassinet/pack-n-play, clothing, bottles, diapers, etc.)  Plans to acquire by discharge      Transportation: Personal vehicle      Education: Information given on CPR classes and Physician/NNP daily rounds.      Resources Given: [facility], [facility], Medicaid transportation, Immunizations, Glossary of Commonly Used Terms, SSI Benefits, Preparing for Your Baby's Discharge Home, Support Resources for NICU Families, Insurance Coverage of Breast Pumps and Supplies, Breast Pumps through [facility], [facility], [facility], and [facility] [place].        Potential Eligibility for SSI Benefits: Yes. Sw to provide diagnosis letter for application process.     Potential Discharge Needs:  [facility]           18 1409   Discharge Assessment   Assessment Type Discharge Planning Assessment   Confirmed/corrected address and phone number on facesheet? Yes   Assessment information obtained from? Caregiver  (mother)   Is patient able to care for self after discharge? No;Patient is of pediatric age   Discharge Plan A Home with family;[facility]      Mike Taylor [facility]  NICU   Phone [phone] Ext. [phone]  [email or web address]

## 2018-02-28 NOTE — ANESTHESIA PROCEDURE NOTES
CSE    Patient location during procedure: OR  Start time: 2/27/2018 10:31 PM  Timeout: 2/27/2018 10:30 PM  End time: 2/27/2018 10:40 PM  Staffing  Anesthesiologist: CAREY GUTIERREZ  Resident/CRNA: RANDY AN  Performed: resident/CRNA   Preanesthetic Checklist  Completed: patient identified, site marked, surgical consent, pre-op evaluation, timeout performed, IV checked, risks and benefits discussed and monitors and equipment checked  CSE  Patient position: sitting  Prep: ChloraPrep  Patient monitoring: heart rate, cardiac monitor, continuous pulse ox and frequent blood pressure checks  Approach: midline  Spinal Needle  Needle type: pencil-tip   Needle gauge: 25 G  Needle length: 5 in  Epidural Needle  Injection technique: EDA air  Needle type: Tuohy   Needle gauge: 17 G  Needle length: 3.5 in  Needle insertion depth: 8 cm  Location: L3-4  Needle localization: anatomical landmarks  Catheter  Catheter type: springwound  Catheter size: 19 G  Catheter at skin depth: 12 cm  Assessment  Sensory level: T5   Dermatomal levels determined by pinch or prick  Intrathecal Medications:  Bolus administered: 1.6 mL of 0.75 and with dextrose bupivacaine  administered: primary anesthetic and 10 mcg of  fentanyl

## 2018-02-28 NOTE — PROGRESS NOTES
[facility]  Obstetrics  Postpartum Progress Note    Patient Name: Kaylie Fuentes  MRN: [id number]  Admission Date: 2/27/2018  Hospital Length of Stay: 1 days  Attending Physician: Nancy Lamb MD  Primary Care Provider: Primary Doctor No    Subjective:     Principal Problem:Chronic hypertension with superimposed preeclampsia    Hospital course: 02/27/2018 Patient presented to [facility] with severe range BP of 172/122, repeat /100. Patient given 20/20/40 of labetalol IV push which brought BP down to 157/94. Magnesium started. preE labs collected. M notified. Admitted to antepartum for BP control.   02/28/2018 - POD #1 s/p RLTCS @ 2330, at [place] 31w2d for chronic hypertension with SI pre-E. On Mag now for seizure prophylaxis. Doing well, pain controlled, meeting Post-op milestones. Surgery was <12 hours ago, so farias is still in place. BP controlled with oral medications - BP: (103-176)/() 144/91.    Interval History:     She is doing well this morning. She is tolerating a regular diet without nausea or vomiting. Farias catheter in place. She is not voiding spontaneously. She is not ambulating. She has passed flatus, and has not a BM. Vaginal bleeding is mild. She denies fever or chills. Abdominal pain is moderate and controlled with IV/oral medications. She is pumping for infant in NICU.     Objective:     Vital Signs (Most Recent):  Temp: 96.3 °F (35.7 °C) (02/28/18 0234)  Pulse: 78 (02/28/18 0638)  Resp: 17 (02/28/18 0335)  BP: (!) 144/91 (02/28/18 0635)  SpO2: 98 % (02/28/18 0638) Vital Signs (24h Range):  Temp:  [95.5 °F (35.3 °C)-98.2 °F (36.8 °C)] 96.3 °F (35.7 °C)  Pulse:  [] 78  Resp:  [16-18] 17  SpO2:  [93 %-99 %] 98 %  BP: (103-176)/() 144/91     Weight: (!) 138.8 kg (306 lb)  Body mass index is 43.91 kg/m².      Intake/Output Summary (Last 24 hours) at 02/28/18 0644  Last data filed at 02/28/18 0592   Gross per 24 hour   Intake          1933.33 ml   Output              3260 ml   Net         -1326.67 ml       Significant Labs:  Lab Results   Component Value Date    GROUPTRYRIS O POS 2018    HEPBSAG Negative 2018       Recent Labs  Lab 18  0508   HGB 12.8   HCT 38.7       I have personallly reviewed all pertinent lab results from the last 24 hours.    Physical Exam:   Constitutional: She is oriented to person, place, and time. She appears well-developed and well-nourished. No distress.    HENT:   Head: Normocephalic and atraumatic.     Neck: Normal range of motion.    Cardiovascular: Normal rate and regular rhythm.  Exam reveals edema (mild pedal edema).     Pulmonary/Chest: Effort normal and breath sounds normal.        Abdominal: Soft. She exhibits abdominal incision (Bandage in place, clean and dry.). She exhibits no distension. There is tenderness (moderate). There is no rebound and no guarding.   Fundus firm and at the umbilicus.   Hypoactive bowel sounds.              Musculoskeletal: Normal range of motion and moves all extremeties.       Neurological: She is alert and oriented to person, place, and time.    Skin: Skin is warm and dry. She is not diaphoretic.    Psychiatric: She has a normal mood and affect. Her behavior is normal. Judgment and thought content normal.       Assessment/Plan:     28 y.o. female  for:    * Chronic hypertension with superimposed preeclampsia    - BP: (103-176)/() 144/91   - Labetalol 400 BID  - Procardia 30 XL  - Denies pre-e symptoms  - UOP: ~100mL/hr        Class 3 severe obesity due to excess calories with body mass index (BMI) of 40.0 to 44.9 in adult    - consider candidate for prevena         Chronic hypertension with exacerbation during pregnancy in third trimester    - CHTN with super imposed PreE with SF(BP)  - Patient with severe range BP in [facility] of 169/98>167/95>167/100>157/94  - s/p 20/20/40 of labetalol  - IV magnesium started 6 g load, 2g/hr, at 2330 18  - BMZ given  - continued home labetalol  400mg BID and procardia 30XL  - Labs on admission: CBC 12/38/217, CMP AST/ALT 20/23, P/C Ratio 0.25              Disposition: As patient meets milestones, will plan to discharge on POD #3-4.    Kilo Hazel MD  Obstetrics  [facility]

## 2018-02-28 NOTE — SUBJECTIVE & OBJECTIVE
Hospital course: 02/27/2018 Patient presented to [facility] with severe range BP of 172/122, repeat /100. Patient given 20/20/40 of labetalol IV push which brought BP down to 157/94. Magnesium started. preE labs collected. [facility] notified. Admitted to antepartum for BP control.   02/28/2018 - POD #1 s/p RLTCS @ 2330, at [place] 31w2d for chronic hypertension with SI pre-E. On Mag now for seizure prophylaxis. Doing well, pain controlled, meeting Post-op milestones. Surgery was <12 hours ago, so farias is still in place. BP controlled with oral medications - BP: (103-176)/() 144/91.    Interval History:     She is doing well this morning. She is tolerating a regular diet without nausea or vomiting. Farias catheter in place. She is not voiding spontaneously. She is not ambulating. She has passed flatus, and has not a BM. Vaginal bleeding is mild. She denies fever or chills. Abdominal pain is moderate and controlled with IV/oral medications. She is pumping for infant in NICU.     Objective:     Vital Signs (Most Recent):  Temp: 96.3 °F (35.7 °C) (02/28/18 0234)  Pulse: 78 (02/28/18 0638)  Resp: 17 (02/28/18 0335)  BP: (!) 144/91 (02/28/18 0635)  SpO2: 98 % (02/28/18 0638) Vital Signs (24h Range):  Temp:  [95.5 °F (35.3 °C)-98.2 °F (36.8 °C)] 96.3 °F (35.7 °C)  Pulse:  [] 78  Resp:  [16-18] 17  SpO2:  [93 %-99 %] 98 %  BP: (103-176)/() 144/91     Weight: (!) 138.8 kg (306 lb)  Body mass index is 43.91 kg/m².      Intake/Output Summary (Last 24 hours) at 02/28/18 0644  Last data filed at 02/28/18 0533   Gross per 24 hour   Intake          1933.33 ml   Output             3260 ml   Net         -1326.67 ml       Significant Labs:  Lab Results   Component Value Date    [facility] SHANNON POS 02/27/2018    HEPBSAG Negative 01/24/2018       Recent Labs  Lab 02/28/18  0508   HGB 12.8   HCT 38.7       I have personallly reviewed all pertinent lab results from the last 24 hours.    Physical Exam:   Constitutional: She is oriented  to person, place, and time. She appears well-developed and well-nourished. No distress.    HENT:   Head: Normocephalic and atraumatic.     Neck: Normal range of motion.    Cardiovascular: Normal rate and regular rhythm.  Exam reveals edema (mild pedal edema).     Pulmonary/Chest: Effort normal and breath sounds normal.        Abdominal: Soft. She exhibits abdominal incision (Bandage in place, clean and dry.). She exhibits no distension. There is tenderness (moderate). There is no rebound and no guarding.   Fundus firm and at the umbilicus.   Hypoactive bowel sounds.              Musculoskeletal: Normal range of motion and moves all extremeties.       Neurological: She is alert and oriented to person, place, and time.    Skin: Skin is warm and dry. She is not diaphoretic.    Psychiatric: She has a normal mood and affect. Her behavior is normal. Judgment and thought content normal.

## 2018-02-28 NOTE — PLAN OF CARE
Problem: Patient Care Overview  Goal: Plan of Care Review  Outcome: Ongoing (interventions implemented as appropriate)   Preparation and Hygiene:    1. Shower daily.  2. Wear a clean bra each day and wash daily in warm soapy water.  3. Change wet or moist breast pads frequently.  Moist pads can promote growth of germs.  4. Actively wash your hands, paying close attention to the area around and under your fingernails, thoroughly with soap and water for 15 seconds before pumping or handling your milk.  Re-wash your hands if you touch anything (scratching your nose, answering the phone, etc) while pumping or handling your milk.   5. Before pumping your breasts, assemble the pump collection kit and have ready the sterile container and labels.  Place these items on a clean surface next to the breastpump.  6. Each time after you have finished pumping, take apart all of the parts of the breastpump collection kit and place them in a separate cleaning container (do not place them in the sink).  Be sure to remove the yellow valve from the breastshield and separate the white membrane from the yellow valve.  Rinse all of these parts with cool water.  Then use a new sponge and/or bottle brush and dishwashing detergent to clean the parts.  Rinse off the soapy water with cool water and air dry on a clean towel covered with a clean cloth.  All parts may also be washed after each use in the top rack of a .  7. Once each day, sterilize all of the parts of the breastpump collection kit.  This can be done by boiling the kit parts for 10 minutes or by using a Quick Clean Micro-Steam Bag made by [employer]  8. If condensation appears in the tubing, continue to run the pump with the tubing attached for 1-2 minutes or until the tubing is dry.   9. Notify your babys nurse or doctor if you become ill or need to take any medication, even over-the-counter medicines.        Collection and Storage of Expressed Breastmilk:          1. Pump your breasts at least 8-10 times every 24 hours.  Double pump (both breasts at  the same time) for at least 15-20 minutes using the most suction that is comfortable.    2. Write the date and time of pumping and the name of any medications you are takingon the babys pre-printed hospital identification label.   3. Place your babys pre-printed hospital identification label on each container of breastmilk.  Additional pre-printed labels can be obtained from your babys nurse.  If your expressed breastmilk is not correctly labeled, the nurse cannot feed the milk to the baby.       4. Place a brightly colored sticker on the top of each container of milk pumped during the first 30 days.  This identifies the milk as special and having higher levels of nutrients and anti-infective properties that are so important for your baby.  Additional stickers can be obtained from the lactation consultants or your babys nurse.  5.   Do not touch the inside of the storage containers or lids.  6.      Pour the amount of expressed milk needed for 1 of your babys feedings into each   storage container. Use a new container(s) for each pumping.  Additional storage   containers can be obtained from your babys nurse.        7.       Tightly screw the lid onto the container and place immediately into the       refrigerator fordaily transportation to the hospital.   Do not freeze your milk      unless asked to do so by your babys nurse.  However, if you are not able to      visit your baby each day, place the expressed breastmilk in the freezer.  8.   Expressed breastmilk should be refrigerated or frozen within 1 hour of      pumping.  9.      Do not store expressed breastmilk on the door of your refrigerator or freezer where the temperature is warmer.         Transportation of Expressed Breastmilk:    1. Refrigerated breastmilk or frozen milk should be packed tightly together in a cooler with frozen, blue gel-packs to keep the  milk frozen.  DO NOT USE ICE CUBES (WET ICE) TO TRANSPORT FROZEN MILK.   A clean towel can be used to fill any extra space between containers of frozen milk.  2.    Bring your expressed milk from home each time you visit the baby.        Assisted with breastpumping;   Requested patient call her RN for assistance with use of breastpump;

## 2018-02-28 NOTE — PLAN OF CARE
Problem: Patient Care Overview  Goal: Plan of Care Review  Outcome: Ongoing (interventions implemented as appropriate)  Ms. Fuentes transferred upstairs to Rec3 and report given to OR nurse Lashonda JAIN prior to transfer. Transferred bed to bed, gen weakness. A&O.   No bleeding, no LOF, no ctx. HA 6/10, decreasing since administration of Fioricet x2.   Sig other at bedside.   Piercings removed and given to family.

## 2018-03-01 PROBLEM — E66.813 CLASS 3 SEVERE OBESITY DUE TO EXCESS CALORIES WITH BODY MASS INDEX (BMI) OF 40.0 TO 44.9 IN ADULT: Status: RESOLVED | Noted: 2018-02-27 | Resolved: 2018-03-01

## 2018-03-01 PROBLEM — E66.01 CLASS 3 SEVERE OBESITY DUE TO EXCESS CALORIES WITH BODY MASS INDEX (BMI) OF 40.0 TO 44.9 IN ADULT: Status: RESOLVED | Noted: 2018-02-27 | Resolved: 2018-03-01

## 2018-03-01 LAB — BACTERIA SPEC AEROBE CULT: NORMAL

## 2018-03-01 PROCEDURE — 99233 SBSQ HOSP IP/OBS HIGH 50: CPT | Mod: TH,,, | Performed by: OBSTETRICS & GYNECOLOGY

## 2018-03-01 PROCEDURE — 11000001 HC ACUTE MED/SURG PRIVATE ROOM

## 2018-03-01 PROCEDURE — 25000003 PHARM REV CODE 250: Performed by: STUDENT IN AN ORGANIZED HEALTH CARE EDUCATION/TRAINING PROGRAM

## 2018-03-01 PROCEDURE — 25000003 PHARM REV CODE 250: Performed by: OBSTETRICS & GYNECOLOGY

## 2018-03-01 RX ORDER — NIFEDIPINE 30 MG/1
60 TABLET, EXTENDED RELEASE ORAL DAILY
Status: DISCONTINUED | OUTPATIENT
Start: 2018-03-02 | End: 2018-03-03 | Stop reason: HOSPADM

## 2018-03-01 RX ORDER — NIFEDIPINE 30 MG/1
30 TABLET, EXTENDED RELEASE ORAL ONCE
Status: COMPLETED | OUTPATIENT
Start: 2018-03-01 | End: 2018-03-01

## 2018-03-01 RX ADMIN — NIFEDIPINE 30 MG: 30 TABLET, FILM COATED, EXTENDED RELEASE ORAL at 10:03

## 2018-03-01 RX ADMIN — OXYCODONE HYDROCHLORIDE AND ACETAMINOPHEN 1 TABLET: 10; 325 TABLET ORAL at 06:03

## 2018-03-01 RX ADMIN — OXYCODONE HYDROCHLORIDE AND ACETAMINOPHEN 1 TABLET: 10; 325 TABLET ORAL at 11:03

## 2018-03-01 RX ADMIN — LABETALOL HYDROCHLORIDE 400 MG: 200 TABLET, FILM COATED ORAL at 08:03

## 2018-03-01 RX ADMIN — ACETAMINOPHEN 650 MG: 325 TABLET ORAL at 12:03

## 2018-03-01 RX ADMIN — DOCUSATE SODIUM 200 MG: 100 CAPSULE, LIQUID FILLED ORAL at 08:03

## 2018-03-01 RX ADMIN — OXYCODONE HYDROCHLORIDE AND ACETAMINOPHEN 1 TABLET: 10; 325 TABLET ORAL at 01:03

## 2018-03-01 RX ADMIN — OXYCODONE HYDROCHLORIDE AND ACETAMINOPHEN 1 TABLET: 10; 325 TABLET ORAL at 04:03

## 2018-03-01 RX ADMIN — SIMETHICONE CHEW TAB 80 MG 80 MG: 80 TABLET ORAL at 06:03

## 2018-03-01 RX ADMIN — SIMETHICONE CHEW TAB 80 MG 80 MG: 80 TABLET ORAL at 04:03

## 2018-03-01 RX ADMIN — MUPIROCIN 1 G: 20 OINTMENT TOPICAL at 08:03

## 2018-03-01 RX ADMIN — OXYCODONE HYDROCHLORIDE AND ACETAMINOPHEN 1 TABLET: 10; 325 TABLET ORAL at 09:03

## 2018-03-01 RX ADMIN — NIFEDIPINE 30 MG: 30 TABLET, FILM COATED, EXTENDED RELEASE ORAL at 08:03

## 2018-03-01 NOTE — PROGRESS NOTES
[facility]  Obstetrics  Postpartum Progress Note    Patient Name: Kaylie Fuentes  MRN: [id number]  Admission Date: 2/27/2018  Hospital Length of Stay: 2 days  Attending Physician: Nancy Lamb MD  Primary Care Provider: Primary Doctor No    Subjective:     Principal Problem:Chronic hypertension with superimposed preeclampsia    Hospital course: 02/27/2018 Patient presented to [facility] with severe range BP of 172/122, repeat /100. Patient given 20/20/40 of labetalol IV push which brought BP down to 157/94. Magnesium started. preE labs collected. M notified. Admitted to antepartum for BP control.   02/28/2018 - POD #1 s/p RLTCS @ 2330, at [place] 31w2d for chronic hypertension with SI pre-E. On Mag now for seizure prophylaxis. Doing well, pain controlled, meeting Post-op milestones. Surgery was <12 hours ago, so farias is still in place. BP controlled with oral medications - BP: (103-176)/() 144/91.  03/01/2018 - POD#2 s/p RLTCS. S/p Mag for seizure prophylaxis. Procardia 30XL, 400 labetalol TID. Blood pressures are well-controlled at this time    Interval History: POD#2 s/p 1LTCS    She is doing well this morning. She is tolerating a regular diet without nausea or vomiting. She is voiding spontaneously. She is ambulating. She has passed flatus, and has not a BM. Vaginal bleeding is mild. She denies fever or chills. Abdominal pain is mild and controlled with oral medications. She is pumping for her infant in the NICU.  She denies pre-E ROS at this time. No complaints.    Objective:     Vital Signs (Most Recent):  Temp: 96.4 °F (35.8 °C) (03/01/18 0420)  Pulse: 74 (03/01/18 0427)  Resp: 16 (02/28/18 1935)  BP: (!) 153/85 (03/01/18 0427)  SpO2: 100 % (03/01/18 0420) Vital Signs (24h Range):  Temp:  [96.4 °F (35.8 °C)-98 °F (36.7 °C)] 96.4 °F (35.8 °C)  Pulse:  [69-86] 74  Resp:  [16-18] 16  SpO2:  [95 %-100 %] 100 %  BP: (128-176)/(65-99) 153/85     Weight: (!) 138.8 kg (306 lb)  Body mass  index is 43.91 kg/m².      Intake/Output Summary (Last 24 hours) at 18 0719  Last data filed at 18 0600   Gross per 24 hour   Intake                0 ml   Output             3555 ml   Net            -3555 ml       Significant Labs:  Lab Results   Component Value Date    GROUPTRH O POS 2018    HEPBSAG Negative 2018    STREPBCULT No Group B Streptococcus isolated 2018       Recent Labs  Lab 18  0508   HGB 12.8   HCT 38.7       I have personallly reviewed all pertinent lab results from the last 24 hours.    Physical Exam:   Constitutional: She is oriented to person, place, and time. She appears well-developed and well-nourished.    HENT:   Head: Normocephalic and atraumatic.    Eyes: EOM are normal.    Neck: Normal range of motion.    Cardiovascular: Normal rate.     Pulmonary/Chest: Effort normal. No respiratory distress.        Abdominal: Soft. She exhibits abdominal incision (Pfannanstiel incision, c/d/i). There is no tenderness. There is no rebound and no guarding.   Fundus firm below umbilicus             Musculoskeletal: Normal range of motion and moves all extremeties.       Neurological: She is alert and oriented to person, place, and time.    Skin: Skin is warm and dry.    Psychiatric: She has a normal mood and affect. Her behavior is normal. Judgment and thought content normal.       Assessment/Plan:     28 y.o. female  for:    * Chronic hypertension with superimposed preeclampsia    - BP: (128-176)/(65-99) 153/85  - Labetalol 400 BID  - Procardia 30 XL  - Denies pre-e symptoms        Chronic hypertension with exacerbation during pregnancy in third trimester    - CHTN with super imposed PreE with SF(BP)  - Patient with severe range BP in [facility] of 169/98>167/95>167/100>157/94  - s/p 20/20/40 of labetalol  - IV magnesium started 6 g load, 2g/hr, at 2330 18  - BMZ given  - continued home labetalol 400mg BID and procardia 30XL  - Labs on admission: CBC /217, CMP  AST/ALT 20/23, P/C Ratio 0.25  - BP: (128-176)/(65-99) 153/85              Disposition: As patient meets milestones, will plan to discharge POD#3-4 if blood pressures are well-controlled.    Sushma K Reddy, MD  Obstetrics  [facility]

## 2018-03-01 NOTE — PLAN OF CARE
Problem: Patient Care Overview  Goal: Plan of Care Review  Outcome: Ongoing (interventions implemented as appropriate)  Mag d/c'd last night, no signs of toxicity, adequate urine output. VSS. Fundus firm and midline, reports light vag bleeding. Patient was able to visit baby in NICU accompanied by fiance. C/o pain in right shoulder radiating down chest. MD notified. Simethicone given for gas pain. Showered, incision open to air. C,D,I. Plan of care reviewed with patient. All questions answered.

## 2018-03-01 NOTE — LACTATION NOTE
LC visit to the room. Mother states that she pumped and got a little milk that she will bring to NICU. She plans to get labels when she goes visit baby in NICU. Reviewed information about pumping 8 or more times a day. Will call [facility] to ask about a pump. Will call nurse with further questions.

## 2018-03-01 NOTE — SUBJECTIVE & OBJECTIVE
Hospital course: 02/27/2018 Patient presented to [facility] with severe range BP of 172/122, repeat /100. Patient given 20/20/40 of labetalol IV push which brought BP down to 157/94. Magnesium started. preE labs collected. [facility] notified. Admitted to antepartum for BP control.   02/28/2018 - POD #1 s/p RLTCS @ 2330, at [place] 31w2d for chronic hypertension with SI pre-E. On Mag now for seizure prophylaxis. Doing well, pain controlled, meeting Post-op milestones. Surgery was <12 hours ago, so farias is still in place. BP controlled with oral medications - BP: (103-176)/() 144/91.  03/01/2018 - POD#2 s/p RLTCS. S/p Mag for seizure prophylaxis. Procardia 30XL, 400 labetalol TID. Blood pressures are well-controlled at this time    Interval History: POD#2 s/p 1LTCS    She is doing well this morning. She is tolerating a regular diet without nausea or vomiting. She is voiding spontaneously. She is ambulating. She has passed flatus, and has not a BM. Vaginal bleeding is mild. She denies fever or chills. Abdominal pain is mild and controlled with oral medications. She is pumping for her infant in the NICU.  She denies pre-E ROS at this time. No complaints.    Objective:     Vital Signs (Most Recent):  Temp: 96.4 °F (35.8 °C) (03/01/18 0420)  Pulse: 74 (03/01/18 0427)  Resp: 16 (02/28/18 1935)  BP: (!) 153/85 (03/01/18 0427)  SpO2: 100 % (03/01/18 0420) Vital Signs (24h Range):  Temp:  [96.4 °F (35.8 °C)-98 °F (36.7 °C)] 96.4 °F (35.8 °C)  Pulse:  [69-86] 74  Resp:  [16-18] 16  SpO2:  [95 %-100 %] 100 %  BP: (128-176)/(65-99) 153/85     Weight: (!) 138.8 kg (306 lb)  Body mass index is 43.91 kg/m².      Intake/Output Summary (Last 24 hours) at 03/01/18 0719  Last data filed at 03/01/18 0600   Gross per 24 hour   Intake                0 ml   Output             3555 ml   Net            -3555 ml       Significant Labs:  Lab Results   Component Value Date    GROUPTRH O POS 02/27/2018    HEPBSAG Negative 01/24/2018    STREPBCULT No  Group B Streptococcus isolated 02/27/2018       Recent Labs  Lab 02/28/18  0508   HGB 12.8   HCT 38.7       I have personallly reviewed all pertinent lab results from the last 24 hours.    Physical Exam:   Constitutional: She is oriented to person, place, and time. She appears well-developed and well-nourished.    HENT:   Head: Normocephalic and atraumatic.    Eyes: EOM are normal.    Neck: Normal range of motion.    Cardiovascular: Normal rate.     Pulmonary/Chest: Effort normal. No respiratory distress.        Abdominal: Soft. She exhibits abdominal incision (Pfannanstiel incision, c/d/i). There is no tenderness. There is no rebound and no guarding.   Fundus firm below umbilicus             Musculoskeletal: Normal range of motion and moves all extremeties.       Neurological: She is alert and oriented to person, place, and time.    Skin: Skin is warm and dry.    Psychiatric: She has a normal mood and affect. Her behavior is normal. Judgment and thought content normal.

## 2018-03-01 NOTE — PROGRESS NOTES
"MAG NOTE     Kaylie Fuentes is a 28 y.o.  who is POD#1 s/p RLTCS, on MgSO4 for seizure ppx secondary to CHTN w/SI PreE w/SF (Ha, BP).    Patient reports pain is well controlled. Patient denies headaches, blurry vision, right upper quadrant pain, CP, SOB, nausea/vomiting.     Objective:       /75 (BP Location: Left arm, Patient Position: Lying)   Pulse 69   Temp 96.4 °F (35.8 °C) (Temporal)   Resp 18   Ht 5' 10" (1.778 m)   Wt (!) 138.8 kg (306 lb)   LMP 2017   SpO2 99%   Breastfeeding? Unknown   BMI 43.91 kg/m²   Vitals:    18 1126 18 1135 18 1206 18 1611   BP: (!) 148/84 (!) 146/83 (!) 156/87 131/75   BP Location:   Left arm Left arm   Patient Position:   Lying Lying   Pulse: 84 86 82 69   Resp:   18 18   Temp:   98 °F (36.7 °C) 96.4 °F (35.8 °C)   TempSrc:   Temporal Temporal   SpO2:   98% 99%   Weight:       Height:             I/O last 3 completed shifts:  In: 1933.3 [I.V.:1933.3]  Out: 5865 [Urine:5425; Blood:440]    No intake/output data recorded.      Date 18 0700 - 18 0659   Shift 8643-4156 9267-2365 6211-7796 24 Hour Total   I  N  T  A  K  E   Shift Total  (mL/kg)       O  U  T  P  U  T   Urine  (mL/kg/hr) 2405  (2.2) 200  2605    Shift Total  (mL/kg) 2405  (17.3) 200  (1.4)  2605  (18.8)   Weight (kg) 138.8 138.8 138.8 138.8         General:   alert, appears stated age and cooperative   Lungs:   clear to auscultation bilaterally   Heart:   regular rate and rhythm, S1, S2 normal, no murmur, click, rub or gallop   Abdomen:  soft, non-tender; bowel sounds normal; no masses,  no organomegaly   Uterine Size:  firm located at the umblicus.    Incision: C/d/i   Extremities: peripheral pulses normal, no pedal edema, no clubbing or cyanosis   Reflexes - 2+  bilaterally       Lab Review    Chemistries:     Recent Labs  Lab 18  1239 18  1901 18  0507    133*  133* 132*   K 4.0 4.3  4.3 4.4    104  103 101   CO2 18* 17*  " 17* 19*   BUN 13 11  11 12   CREATININE 0.8 0.8  0.8 0.9   CALCIUM 9.2 8.9  8.9 7.9*   PROT 6.8 7.4  7.4 6.8   BILITOT 0.2 0.2  0.2 0.3   ALKPHOS 92 95  99 85   ALT 23 22  25 22   AST 20 19  21 20        CBC/Anemia Labs:     Recent Labs  Lab 18  1239 18  1901 18  0508   WBC 9.76 11.38  11.18 18.15*   HGB 12.7 13.8  13.7 12.8   HCT 38.4 41.2  41.4 38.7    259  237 234   MCV 83 83  83 84   RDW 14.9* 15.0*  14.9* 15.3*           Assessment:     Kaylie Fuentes is a 28 y.o.  who is POD#1 s/p RLTCS, on MgSO4 for seizure ppx secondary to CHTN w/SI PreE w/SF (Ha, BP).     Plan:   1. Continue magnesium sulfate, no signs of toxicity at this time   -delivered 2330 , plan to DC mag at 2330 tonight  2. Monitor for s/s of worsening Pre-Eclampsia  3. Close maternal monitoring including UOP and BP   - BP: (103-171)/(51-99) 131/75   - UOP 100cc/hr  4. Recheck in 2-4 hours or PRN    Alison Evans M.D.  PGY-4 OB/GYN

## 2018-03-01 NOTE — PLAN OF CARE
Problem: Patient Care Overview  Goal: Plan of Care Review  Outcome: Ongoing (interventions implemented as appropriate)  Pt SBP in 140's, all other VS stable, pt reports pain relief post pain medication, pt denies n/v, h/a, dizziness, SOB, chest pains, pt's bleeding is WNL, incision site is clean, dry, and intact, bed at lowest position, call light within reach, side rails up x2, significant other at bedside, pt has no further questions, comments, or concerns at this time, will continue to monitor.

## 2018-03-01 NOTE — ASSESSMENT & PLAN NOTE
- CHTN with super imposed PreE with SF(BP)  - Patient with severe range BP in [facility] of 169/98>167/95>167/100>157/94  - s/p 20/20/40 of labetalol  - IV magnesium started 6 g load, 2g/hr, at 2330 2/27/18  - BMZ given  - continued home labetalol 400mg BID and procardia 30XL  - Labs on admission: CBC 12/38/217, CMP AST/ALT 20/23, P/C Ratio 0.25  - BP: (128-176)/(65-99) 153/85

## 2018-03-02 PROBLEM — O10.913 CHRONIC HYPERTENSION WITH EXACERBATION DURING PREGNANCY IN THIRD TRIMESTER: Status: RESOLVED | Noted: 2018-02-27 | Resolved: 2018-03-02

## 2018-03-02 PROCEDURE — 25000003 PHARM REV CODE 250: Performed by: STUDENT IN AN ORGANIZED HEALTH CARE EDUCATION/TRAINING PROGRAM

## 2018-03-02 PROCEDURE — 25000003 PHARM REV CODE 250: Performed by: OBSTETRICS & GYNECOLOGY

## 2018-03-02 PROCEDURE — 11000001 HC ACUTE MED/SURG PRIVATE ROOM

## 2018-03-02 PROCEDURE — 99232 SBSQ HOSP IP/OBS MODERATE 35: CPT | Mod: ,,, | Performed by: OBSTETRICS & GYNECOLOGY

## 2018-03-02 RX ORDER — IBUPROFEN 600 MG/1
600 TABLET ORAL EVERY 6 HOURS
Status: DISCONTINUED | OUTPATIENT
Start: 2018-03-02 | End: 2018-03-03 | Stop reason: HOSPADM

## 2018-03-02 RX ORDER — LABETALOL 200 MG/1
400 TABLET, FILM COATED ORAL ONCE
Status: DISCONTINUED | OUTPATIENT
Start: 2018-03-02 | End: 2018-03-03 | Stop reason: HOSPADM

## 2018-03-02 RX ORDER — NIFEDIPINE 10 MG/1
10 CAPSULE ORAL ONCE
Status: COMPLETED | OUTPATIENT
Start: 2018-03-02 | End: 2018-03-02

## 2018-03-02 RX ORDER — LABETALOL 200 MG/1
400 TABLET, FILM COATED ORAL 3 TIMES DAILY
Status: DISCONTINUED | OUTPATIENT
Start: 2018-03-02 | End: 2018-03-03 | Stop reason: HOSPADM

## 2018-03-02 RX ADMIN — IBUPROFEN 600 MG: 600 TABLET, FILM COATED ORAL at 09:03

## 2018-03-02 RX ADMIN — NIFEDIPINE 60 MG: 30 TABLET, FILM COATED, EXTENDED RELEASE ORAL at 09:03

## 2018-03-02 RX ADMIN — DOCUSATE SODIUM 200 MG: 100 CAPSULE, LIQUID FILLED ORAL at 09:03

## 2018-03-02 RX ADMIN — LABETALOL HYDROCHLORIDE 400 MG: 200 TABLET, FILM COATED ORAL at 09:03

## 2018-03-02 RX ADMIN — NIFEDIPINE 10 MG: 10 CAPSULE, LIQUID FILLED ORAL at 03:03

## 2018-03-02 RX ADMIN — IBUPROFEN 600 MG: 600 TABLET, FILM COATED ORAL at 03:03

## 2018-03-02 RX ADMIN — LABETALOL HYDROCHLORIDE 400 MG: 200 TABLET, FILM COATED ORAL at 02:03

## 2018-03-02 RX ADMIN — OXYCODONE HYDROCHLORIDE AND ACETAMINOPHEN 1 TABLET: 10; 325 TABLET ORAL at 03:03

## 2018-03-02 NOTE — PLAN OF CARE
Problem: Patient Care Overview  Goal: Plan of Care Review  Outcome: Ongoing (interventions implemented as appropriate)  BPs elevated in mild to severe range throughout the night. Dr. Pratt notified with elevations and orders followed. BP responsive to PO medication. Denies HA, vision changes, RUQ pain, SOB, and chest pain. Other VSS. Pain relieved by PRN PO medication. Uterus firm, midline, below umbilicus. Bleeding light per patient. Ambulating and voiding. Incision well-approximated, clean, and dry. SCDs in place. IV removed due to leaking and non-patency. OK per Dr. Pratt for pt to be without IV access. Pumping independently for infant in NICU. Pt with no questions or concerns at this time.

## 2018-03-02 NOTE — PROGRESS NOTES
03/01/18 2028   Vital Signs   BP (!) 161/96   MAP (mmHg) 122     Dr. Pratt notified of repeat BP. Will give scheduled nightly PO labetalol now and retake BP 1 hr after administration as ordered.

## 2018-03-02 NOTE — PROGRESS NOTES
[facility]  Obstetrics  Postpartum Progress Note    Patient Name: Kaylie Fuentes  MRN: [id number]  Admission Date: 2/27/2018  Hospital Length of Stay: 3 days  Attending Physician: Nancy Lamb MD  Primary Care Provider: Primary Doctor No    Subjective:     Principal Problem:Chronic hypertension with superimposed preeclampsia    Hospital course: 02/27/2018 Patient presented to [facility] with severe range BP of 172/122, repeat /100. Patient given 20/20/40 of labetalol IV push which brought BP down to 157/94. Magnesium started. preE labs collected. M notified. Admitted to antepartum for BP control.   02/28/2018 - POD #1 s/p RLTCS @ 2330, at [place] 31w2d for chronic hypertension with SI pre-E. On Mag now for seizure prophylaxis. Doing well, pain controlled, meeting Post-op milestones. Surgery was <12 hours ago, so farias is still in place. BP controlled with oral medications - BP: (103-176)/() 144/91.  03/01/2018 - POD#2 s/p RLTCS. S/p Mag for seizure prophylaxis. Procardia 30XL, 400 labetalol BID. Blood pressures continued to be elevated throughout the day; procardia increased to 60XL to start in the morning. Denies pre-E ROS.  03/02/2018 - POD#3 s/p RLTCS. S/p Mag for seizure prophylaxis. Blood pressure is elevated this morning; will start procardia 60XL today. Otherwise, she is doing well this morning, meeting all postpartum goals (ambulating, pain well-controlled, tolerating reg diet without n/v, passing flatus, voiding spontaneously). She denies headaches, chest pain, SOB, vision changes, or RUQ pain.    Interval History: POD#3 s/p RLTCS. S/p Mag sulfate for seizure prophylaxis.    She is doing well this morning. She is tolerating a regular diet without nausea or vomiting. She is voiding spontaneously. She is ambulating. She has passed flatus, and has a BM. Vaginal bleeding is mild. She denies fever or chills. Abdominal pain is mild and controlled with oral medications. She is pumping for  her baby in the NICU. She denies headaches, chest pain, SOB, RUQ pain, or vision changes. No other complaints.    Objective:     Vital Signs (Most Recent):  Temp: 97 °F (36.1 °C) (03/02/18 0337)  Pulse: 76 (03/02/18 0337)  Resp: 18 (03/02/18 0337)  BP: (!) 159/87 (Dr. Pratt notified, no new orders) (03/02/18 0337)  SpO2: 98 % (03/02/18 0337) Vital Signs (24h Range):  Temp:  [96.6 °F (35.9 °C)-97.9 °F (36.6 °C)] 97 °F (36.1 °C)  Pulse:  [75-93] 76  Resp:  [18] 18  SpO2:  [98 %-100 %] 98 %  BP: (141-171)/(76-96) 159/87     Weight: (!) 138.8 kg (306 lb)  Body mass index is 43.91 kg/m².      Intake/Output Summary (Last 24 hours) at 03/02/18 0717  Last data filed at 03/01/18 1900   Gross per 24 hour   Intake                0 ml   Output             1080 ml   Net            -1080 ml       Significant Labs:  Lab Results   Component Value Date    GROUPTRH O POS 02/27/2018    HEPBSAG Negative 01/24/2018    STREPBCULT No Group B Streptococcus isolated 02/27/2018     No results for input(s): HGB, HCT in the last 48 hours.    I have personallly reviewed all pertinent lab results from the last 24 hours.    Physical Exam:   Constitutional: She is oriented to person, place, and time. She appears well-developed and well-nourished.    HENT:   Head: Normocephalic and atraumatic.    Eyes: EOM are normal.    Neck: Normal range of motion.    Cardiovascular: Normal rate.     Pulmonary/Chest: Effort normal. No respiratory distress.        Abdominal: Soft. She exhibits abdominal incision (Pfannanstiel incision, c/d/i, pad placed over incision). There is no tenderness. There is no rebound and no guarding.   Fundus firm below umbilicus             Musculoskeletal: Normal range of motion and moves all extremeties.       Neurological: She is alert and oriented to person, place, and time.    Skin: Skin is warm and dry.    Psychiatric: She has a normal mood and affect. Her behavior is normal. Judgment and thought content normal.        Assessment/Plan:     28 y.o. female  for:    * Chronic hypertension with superimposed preeclampsia    - CHTN with super imposed PreE with SF(BP)  - Patient with severe range BP in [facility] of 169/98>167/95>167/100>157/94  - s/p 20/20/40 of labetalol  - s/p IV magnesium with 6 g load, 2g/hr  - BMZ given  - now s/p RLTCS  - Monitoring BP: (141-171)/(76-96) 159/87. Elevated yesterday and overnight   - Continued home labetalol 400mg BID and procardia 30XL    - procardia now increased to 60XL to start this morning.   - denies pre-E ROS at this time   - Labs on admission: CBC /, CMP AST/ALT , P/C Ratio 0.25         delivery delivered    Postpartum care:  - Patient doing well. Continue routine management and advances.  - Continue PO pain meds. Pain well controlled.  - Encourage ambulation.   - Heme: Pre Delivery h/h  --> Post Delivery h/h   - Circumcision - male infant is in the NICU   - Contraception - per primary OB  - Lactation - The patient is pumping for her baby in the NICU. Lactation nurse following along PRN  - Rh Status - pos            Disposition: As patient meets milestones, will plan to discharge POD#3-4 if blood pressures are controlled.    Sushma K Reddy, MD  Obstetrics  [facility]

## 2018-03-02 NOTE — ASSESSMENT & PLAN NOTE
Postpartum care:  - Patient doing well. Continue routine management and advances.  - Continue PO pain meds. Pain well controlled.  - Encourage ambulation.   - Heme: Pre Delivery h/h 12/38 --> Post Delivery h/h 12/38  - Circumcision - male infant is in the NICU   - Contraception - per primary OB  - Lactation - The patient is pumping for her baby in the NICU. Lactation nurse following along PRN  - Rh Status - pos

## 2018-03-02 NOTE — SUBJECTIVE & OBJECTIVE
Hospital course: 02/27/2018 Patient presented to [facility] with severe range BP of 172/122, repeat /100. Patient given 20/20/40 of labetalol IV push which brought BP down to 157/94. Magnesium started. preE labs collected. [facility] notified. Admitted to antepartum for BP control.   02/28/2018 - POD #1 s/p RLTCS @ 2330, at [place] 31w2d for chronic hypertension with SI pre-E. On Mag now for seizure prophylaxis. Doing well, pain controlled, meeting Post-op milestones. Surgery was <12 hours ago, so farias is still in place. BP controlled with oral medications - BP: (103-176)/() 144/91.  03/01/2018 - POD#2 s/p RLTCS. S/p Mag for seizure prophylaxis. Procardia 30XL, 400 labetalol BID. Blood pressures continued to be elevated throughout the day; procardia increased to 60XL to start in the morning. Denies pre-E ROS.  03/02/2018 - POD#3 s/p RLTCS. S/p Mag for seizure prophylaxis. Blood pressure is elevated this morning; will start procardia 60XL today. Otherwise, she is doing well this morning, meeting all postpartum goals (ambulating, pain well-controlled, tolerating reg diet without n/v, passing flatus, voiding spontaneously). She denies headaches, chest pain, SOB, vision changes, or RUQ pain.    Interval History: POD#3 s/p RLTCS. S/p Mag sulfate for seizure prophylaxis.    She is doing well this morning. She is tolerating a regular diet without nausea or vomiting. She is voiding spontaneously. She is ambulating. She has passed flatus, and has a BM. Vaginal bleeding is mild. She denies fever or chills. Abdominal pain is mild and controlled with oral medications. She is pumping for her baby in the NICU. She denies headaches, chest pain, SOB, RUQ pain, or vision changes. No other complaints.    Objective:     Vital Signs (Most Recent):  Temp: 97 °F (36.1 °C) (03/02/18 0337)  Pulse: 76 (03/02/18 0337)  Resp: 18 (03/02/18 0337)  BP: (!) 159/87 (Dr. Pratt notified, no new orders) (03/02/18 0337)  SpO2: 98 % (03/02/18 0337) Vital  Signs (24h Range):  Temp:  [96.6 °F (35.9 °C)-97.9 °F (36.6 °C)] 97 °F (36.1 °C)  Pulse:  [75-93] 76  Resp:  [18] 18  SpO2:  [98 %-100 %] 98 %  BP: (141-171)/(76-96) 159/87     Weight: (!) 138.8 kg (306 lb)  Body mass index is 43.91 kg/m².      Intake/Output Summary (Last 24 hours) at 03/02/18 0717  Last data filed at 03/01/18 1900   Gross per 24 hour   Intake                0 ml   Output             1080 ml   Net            -1080 ml       Significant Labs:  Lab Results   Component Value Date    GROUPTRH O POS 02/27/2018    HEPBSAG Negative 01/24/2018    STREPBCULT No Group B Streptococcus isolated 02/27/2018     No results for input(s): HGB, HCT in the last 48 hours.    I have personallly reviewed all pertinent lab results from the last 24 hours.    Physical Exam:   Constitutional: She is oriented to person, place, and time. She appears well-developed and well-nourished.    HENT:   Head: Normocephalic and atraumatic.    Eyes: EOM are normal.    Neck: Normal range of motion.    Cardiovascular: Normal rate.     Pulmonary/Chest: Effort normal. No respiratory distress.        Abdominal: Soft. She exhibits abdominal incision (Pfannanstiel incision, c/d/i, pad placed over incision). There is no tenderness. There is no rebound and no guarding.   Fundus firm below umbilicus             Musculoskeletal: Normal range of motion and moves all extremeties.       Neurological: She is alert and oriented to person, place, and time.    Skin: Skin is warm and dry.    Psychiatric: She has a normal mood and affect. Her behavior is normal. Judgment and thought content normal.

## 2018-03-02 NOTE — ASSESSMENT & PLAN NOTE
- CHTN with super imposed PreE with SF(BP)  - Patient with severe range BP in [facility] of 169/98>167/95>167/100>157/94  - s/p 20/20/40 of labetalol  - s/p IV magnesium with 6 g load, 2g/hr  - BMZ given  - now s/p RLTCS  - Monitoring BP: (141-171)/(76-96) 159/87. Elevated yesterday and overnight   - Continued home labetalol 400mg BID and procardia 30XL    - procardia now increased to 60XL to start this morning.   - denies pre-E ROS at this time   - Labs on admission: CBC 12/38/217, CMP AST/ALT 20/23, P/C Ratio 0.25     See other result msg.   Spoke with pt.

## 2018-03-02 NOTE — ASSESSMENT & PLAN NOTE
- CHTN with super imposed PreE with SF(BP)  - Patient with severe range BP in [facility] of 169/98>167/95>167/100>157/94  - s/p 20/20/40 of labetalol  - s/p IV magnesium with 6 g load, 2g/hr  - BMZ given  - now s/p RLTCS  - Monitoring BP: (141-171)/(76-96) 159/87. Elevated yesterday and overnight   - Continued home labetalol 400mg BID and procardia 30XL    - procardia now increased to 60XL to start this morning.   - denies pre-E ROS at this time   - Labs on admission: CBC 12/38/217, CMP AST/ALT 20/23, P/C Ratio 0.25

## 2018-03-03 VITALS
OXYGEN SATURATION: 100 % | WEIGHT: 293 LBS | DIASTOLIC BLOOD PRESSURE: 94 MMHG | HEIGHT: 70 IN | RESPIRATION RATE: 16 BRPM | HEART RATE: 67 BPM | TEMPERATURE: 97 F | SYSTOLIC BLOOD PRESSURE: 155 MMHG | BODY MASS INDEX: 41.95 KG/M2

## 2018-03-03 PROCEDURE — 25000003 PHARM REV CODE 250: Performed by: STUDENT IN AN ORGANIZED HEALTH CARE EDUCATION/TRAINING PROGRAM

## 2018-03-03 PROCEDURE — 90471 IMMUNIZATION ADMIN: CPT | Performed by: OBSTETRICS & GYNECOLOGY

## 2018-03-03 PROCEDURE — 63600175 PHARM REV CODE 636 W HCPCS: Performed by: OBSTETRICS & GYNECOLOGY

## 2018-03-03 PROCEDURE — 99238 HOSP IP/OBS DSCHRG MGMT 30/<: CPT | Mod: ,,, | Performed by: OBSTETRICS & GYNECOLOGY

## 2018-03-03 PROCEDURE — 25000003 PHARM REV CODE 250: Performed by: OBSTETRICS & GYNECOLOGY

## 2018-03-03 PROCEDURE — 90715 TDAP VACCINE 7 YRS/> IM: CPT | Performed by: OBSTETRICS & GYNECOLOGY

## 2018-03-03 RX ORDER — OXYCODONE AND ACETAMINOPHEN 5; 325 MG/1; MG/1
1 TABLET ORAL EVERY 4 HOURS PRN
Qty: 30 TABLET | Refills: 0 | Status: SHIPPED | OUTPATIENT
Start: 2018-03-03 | End: 2018-04-09

## 2018-03-03 RX ORDER — SODIUM CHLORIDE, SODIUM LACTATE, POTASSIUM CHLORIDE, CALCIUM CHLORIDE 600; 310; 30; 20 MG/100ML; MG/100ML; MG/100ML; MG/100ML
INJECTION, SOLUTION INTRAVENOUS CONTINUOUS
Status: DISCONTINUED | OUTPATIENT
Start: 2018-03-03 | End: 2018-03-03 | Stop reason: HOSPADM

## 2018-03-03 RX ORDER — NIFEDIPINE 30 MG/1
30 TABLET, EXTENDED RELEASE ORAL ONCE
Status: COMPLETED | OUTPATIENT
Start: 2018-03-03 | End: 2018-03-03

## 2018-03-03 RX ORDER — OXYTOCIN/RINGER'S LACTATE 20/1000 ML
41.65 PLASTIC BAG, INJECTION (ML) INTRAVENOUS CONTINUOUS
Status: DISCONTINUED | OUTPATIENT
Start: 2018-03-03 | End: 2018-03-03 | Stop reason: HOSPADM

## 2018-03-03 RX ORDER — NIFEDIPINE 30 MG/1
90 TABLET, EXTENDED RELEASE ORAL DAILY
Qty: 90 TABLET | Refills: 11 | Status: SHIPPED | OUTPATIENT
Start: 2018-03-03 | End: 2018-04-09

## 2018-03-03 RX ORDER — IBUPROFEN 600 MG/1
600 TABLET ORAL EVERY 6 HOURS
Qty: 30 TABLET | Refills: 2 | Status: SHIPPED | OUTPATIENT
Start: 2018-03-03 | End: 2019-09-13

## 2018-03-03 RX ADMIN — CLOSTRIDIUM TETANI TOXOID ANTIGEN (FORMALDEHYDE INACTIVATED), CORYNEBACTERIUM DIPHTHERIAE TOXOID ANTIGEN (FORMALDEHYDE INACTIVATED), BORDETELLA PERTUSSIS TOXOID ANTIGEN (GLUTARALDEHYDE INACTIVATED), BORDETELLA PERTUSSIS FILAMENTOUS HEMAGGLUTININ ANTIGEN (FORMALDEHYDE INACTIVATED), BORDETELLA PERTUSSIS PERTACTIN ANTIGEN, AND BORDETELLA PERTUSSIS FIMBRIAE 2/3 ANTIGEN 0.5 ML: 5; 2; 2.5; 5; 3; 5 INJECTION, SUSPENSION INTRAMUSCULAR at 09:03

## 2018-03-03 RX ADMIN — OXYCODONE HYDROCHLORIDE AND ACETAMINOPHEN 1 TABLET: 10; 325 TABLET ORAL at 12:03

## 2018-03-03 RX ADMIN — IBUPROFEN 600 MG: 600 TABLET, FILM COATED ORAL at 05:03

## 2018-03-03 RX ADMIN — LABETALOL HYDROCHLORIDE 400 MG: 200 TABLET, FILM COATED ORAL at 08:03

## 2018-03-03 RX ADMIN — NIFEDIPINE 60 MG: 30 TABLET, FILM COATED, EXTENDED RELEASE ORAL at 08:03

## 2018-03-03 RX ADMIN — NIFEDIPINE 30 MG: 30 TABLET, FILM COATED, EXTENDED RELEASE ORAL at 09:03

## 2018-03-03 RX ADMIN — OXYCODONE HYDROCHLORIDE AND ACETAMINOPHEN 1 TABLET: 5; 325 TABLET ORAL at 07:03

## 2018-03-03 NOTE — DISCHARGE SUMMARY
[facility]  Obstetrics  Discharge Summary      Patient Name: Kaylie Fuentes  MRN: [id number]  Admission Date: 2018  Hospital Length of Stay: 4 days  Discharge Date and Time:  2018 8:52 AM  Attending Physician: Nancy Lamb MD   Discharging Provider: Ravi Lees MD  Primary Care Provider: Primary Doctor No    HPI: Kaylie Fuentes is a 28 y.o. [id number] at 31w2d presents from T with elevated BP of 170s/120s in clinic. Patient denies any Ha, vision changes, RUQ pain, CP or SOB. Patient has CHTN. Prior to pregnancy was on lisinopril 25mg daily. At the beginning of pregnancy she was started on procardia 30XL. She has titrated up to procardia 30XL daily and labetalol 400mg BID. Her previous pregnancy was complicated by preEclampsia with  SF (BP) resulting in a LTCS 2/2 to NFHTS at 38wks gestation.  This IUP is complicated by uncontrolled CHTN, morbid obesity, h/o of LTCS.  Patient denies contractions, denies vaginal bleeding, denies LOF. Confirms good fetal movement.     Procedure(s) (LRB):  DELIVERY- SECTION (N/A)     Hospital Course:   2018 Patient presented to [facility] with severe range BP of 172/122, repeat /100. Patient given 20/20/40 of labetalol IV push which brought BP down to 157/94. Magnesium started. preE labs collected. [facility] notified. Admitted to antepartum for BP control.   2018 - POD #1 s/p RLTCS @ 2330, at [place] 31w2d for chronic hypertension with SI pre-E. On Mag now for seizure prophylaxis. Doing well, pain controlled, meeting Post-op milestones. Surgery was <12 hours ago, so farias is still in place. BP controlled with oral medications - BP: (103-176)/() 144/91.  2018 - POD#2 s/p RLTCS. S/p Mag for seizure prophylaxis. Procardia 30XL, 400 labetalol BID. Blood pressures continued to be elevated throughout the day; procardia increased to 60XL to start in the morning. Denies pre-E ROS.  2018 - POD#3 s/p RLTCS. S/p Mag for seizure  prophylaxis. Blood pressure is elevated this morning; will start procardia 60XL today. Otherwise, she is doing well this morning, meeting all postpartum goals (ambulating, pain well-controlled, tolerating reg diet without n/v, passing flatus, voiding spontaneously). She denies headaches, chest pain, SOB, vision changes, or RUQ pain.  2018 - POD #4. Stable on 90 Procardia XL and 400 mg labetalol BID. No new complaints. Meeting mitchell rogers, stable for discharge        Final Active Diagnoses:    Diagnosis Date Noted POA    PRINCIPAL PROBLEM:  Chronic hypertension with superimposed preeclampsia [O11.9] 2018 Unknown     delivery delivered [O82] 2018 Unknown      Problems Resolved During this Admission:    Diagnosis Date Noted Date Resolved POA    Chronic hypertension with exacerbation during pregnancy in third trimester [O10.913] 2018 Yes        Labs: All labs within the past 24 hours have been reviewed    Feeding Method: both breast and bottle    Immunizations     Date Immunization Status Dose Route/Site Given by    18 MMR Incomplete 0.5 mL Subcutaneous/Left deltoid     18 Tdap Incomplete 0.5 mL Intramuscular/Left deltoid           Delivery:    Episiotomy:     Lacerations:     Repair suture:     Repair # of packets:     Blood loss (ml): 0     Birth information:  YOB: 2018   Time of birth: 11:10 PM   Sex: male   Delivery type: , Low Transverse   Gestational Age: 31w2d    Delivery Clinician:      Other providers:       Additional  information:  Forceps:    Vacuum:    Breech:    Observed anomalies      Living?:           APGARS  One minute Five minutes Ten minutes   Skin color:         Heart rate:         Grimace:         Muscle tone:         Breathing:         Totals: 3  4  8      Placenta: Delivered:       appearance    Pending Diagnostic Studies:     None          Discharged Condition: good    Disposition: Home or Self  Care    Follow Up:  Follow-up Information     Nancy Lamb MD In 1 week.    Specialties:  Obstetrics, Obstetrics and Gynecology  Why:  Blood Pressure Check  Contact information:  [address] [id number] [phone]                 Patient Instructions:     Activity as tolerated     Notify your health care provider if you experience any of the following:  temperature >100.4     Notify your health care provider if you experience any of the following:  persistent nausea and vomiting or diarrhea     Notify your health care provider if you experience any of the following:  severe uncontrolled pain     Notify your health care provider if you experience any of the following:  redness, tenderness, or signs of infection (pain, swelling, redness, odor or green/yellow discharge around incision site)     Notify your health care provider if you experience any of the following:   Order Comments: HA, vision changes, difficulty breathing, right upper abdominal pain       Medications:  Current Discharge Medication List      CONTINUE these medications which have CHANGED    Details   NIFEdipine (PROCARDIA-XL) 30 MG (OSM) 24 hr tablet Take 3 tablets (90 mg total) by mouth once daily.  Qty: 90 tablet, Refills: 11    Associated Diagnoses: Hypertension affecting pregnancy in first trimester         CONTINUE these medications which have NOT CHANGED    Details   guaifenesin 100 mg/5 ml (ROBITUSSIN) 100 mg/5 mL syrup Take 200 mg by mouth 3 (three) times daily as needed for Cough.      labetalol (NORMODYNE) 200 MG tablet Take 2 tablets (400 mg total) by mouth 2 (two) times daily.  Qty: 120 tablet, Refills: 11    Associated Diagnoses: Hypertension, unspecified type      oseltamivir (TAMIFLU) 30 MG capsule Take 75 mg by mouth 2 (two) times daily.      PRENATAL VIT/IRON FUM/FOLIC AC (PRENATAL 1+1 ORAL) Take by mouth.      promethazine (PHENERGAN) 6.25 mg/5 mL syrup Take 25 mg by mouth every 6 (six) hours as needed  for Nausea.             Ravi Lees MD  Obstetrics  [facility]    SEEN AND EXAMINED BY ME  AGREE WITH ABOVE    Carson Leavitt MD

## 2018-03-03 NOTE — LACTATION NOTE
03/02/18 1735   Equipment Type/Education   Pump Type Symphony   Breast Pump Type double electric, hospital grade   Breast Pump Flange Type hard   Breast Pump Flange Size 24 mm   Lactation Interventions   Attachment Promotion counseling provided;role responsibility promoted   Breastfeeding Assistance support offered   Maternal Breastfeeding Support encouragement offered;lactation counseling provided;maternal hydration promoted   Praised patient for providing breastmilk for her baby; requested she call lactation for assistance with breastpumping; guests at bedside;

## 2018-03-03 NOTE — DISCHARGE INSTRUCTIONS
Basic lactation discharge instructions for NICU baby    Pump at least 8-10 times daily for 15-20 minutes  Wash kit after every use by rinsing with cold water, wash with hot soapy water, rinse with cold water and air dry on paper towels.  Label each bottle with baby label . Write on label date, time and medications taken.  Refrigerate breastmilk if visiting baby daily. Transport breastmilk in an insulated lunch bag or small ice chest using gel packs to keep breastmilk cold and or frozen.  If you unable to visit baby daily , freeze breastmilk and bring to hospital frozen.    Bring pumping kit to hospital while visiting baby to pump at bedside and leave with nurses.  If you need more labels or containers ask nurse taking care of baby.    Questions Call lactation consultant: [phone]

## 2018-03-03 NOTE — LACTATION NOTE
03/03/18 1140   Maternal Infant Assessment   Breast Density Bilateral:;filling   Breasts WDL   Breasts WDL WDL       Number Scale   Presence of Pain denies   Location breast   Pain Rating: Rest 0   Pain Rating: Activity 0   Maternal Infant Feeding   Maternal Emotional State independent;relaxed   Time Spent (min) 15-30 min   Engorgement Measures (reviewed)   Breastfeeding Education adequate milk volume;diet;importance of skin-to-skin contact;increasing milk supply;label/storage of breast milk;medication effects;milk expression, electric pump   Equipment Type/Education   Pump Type Symphony   Breast Pump Type double electric, hospital grade   Breast Pump Flange Type hard   Breast Pump Flange Size 24 mm   Breast Pumping Bilateral Breasts:   Pumping Frequency (times) (8 or more in 24 hours)   Lactation Referrals   Lactation Consult Follow up;Pump teaching   Lactation Interventions   Breastfeeding Assistance electric breast pump used;milk expression/pumping   Maternal Breastfeeding Support diary/feeding log utilized;encouragement offered;lactation counseling provided;maternal hydration promoted;maternal nutrition promoted;maternal rest encouraged   discharge education reviewed

## 2018-03-03 NOTE — SUBJECTIVE & OBJECTIVE
Hospital course: 02/27/2018 Patient presented to [facility] with severe range BP of 172/122, repeat /100. Patient given 20/20/40 of labetalol IV push which brought BP down to 157/94. Magnesium started. preE labs collected. [facility] notified. Admitted to antepartum for BP control.   02/28/2018 - POD #1 s/p RLTCS @ 2330, at [place] 31w2d for chronic hypertension with SI pre-E. On Mag now for seizure prophylaxis. Doing well, pain controlled, meeting Post-op milestones. Surgery was <12 hours ago, so farias is still in place. BP controlled with oral medications - BP: (103-176)/() 144/91.  03/01/2018 - POD#2 s/p RLTCS. S/p Mag for seizure prophylaxis. Procardia 30XL, 400 labetalol BID. Blood pressures continued to be elevated throughout the day; procardia increased to 60XL to start in the morning. Denies pre-E ROS.  03/02/2018 - POD#3 s/p RLTCS. S/p Mag for seizure prophylaxis. Blood pressure is elevated this morning; will start procardia 60XL today. Otherwise, she is doing well this morning, meeting all postpartum goals (ambulating, pain well-controlled, tolerating reg diet without n/v, passing flatus, voiding spontaneously). She denies headaches, chest pain, SOB, vision changes, or RUQ pain.  03/03/2018 - POD #4. Stable on 90 Procardia XL and 400 mg labetalol BID. No new complaints. Meeting pp raquel rogers, stable for discharge    Interval History: POD#4 s/p RLTCS. S/p Mag sulfate for seizure prophylaxis. Stable on 90 Procardia XL and 400 mg labetalol BID. No new complaints. Meeting pp mile stones, stable for discharge    She is doing well this morning. She is tolerating a regular diet without nausea or vomiting. She is voiding spontaneously. She is ambulating. She has passed flatus, and has a BM. Vaginal bleeding is mild. She denies fever or chills. Abdominal pain is mild and controlled with oral medications. She is pumping for her baby in the NICU. She denies headaches, chest pain, SOB, RUQ pain, or vision changes. No other  complaints.    Objective:     Vital Signs (Most Recent):  Temp: 97.2 °F (36.2 °C) (03/03/18 0409)  Pulse: 76 (03/03/18 0415)  Resp: 18 (03/03/18 0005)  BP: (!) 155/84 (03/03/18 0415)  SpO2: 99 % (03/03/18 0005) Vital Signs (24h Range):  Temp:  [96.6 °F (35.9 °C)-97.2 °F (36.2 °C)] 97.2 °F (36.2 °C)  Pulse:  [71-94] 76  Resp:  [18] 18  SpO2:  [97 %-99 %] 99 %  BP: (143-183)/() 155/84     Weight: (!) 138.8 kg (306 lb)  Body mass index is 43.91 kg/m².    No intake or output data in the 24 hours ending 03/03/18 0848    Significant Labs:  Lab Results   Component Value Date    GROUPTRH O POS 02/27/2018    HEPBSAG Negative 01/24/2018    STREPBCULT No Group B Streptococcus isolated 02/27/2018     No results for input(s): HGB, HCT in the last 48 hours.    I have personallly reviewed all pertinent lab results from the last 24 hours.    Physical Exam:   Constitutional: She is oriented to person, place, and time. She appears well-developed and well-nourished.    HENT:   Head: Normocephalic and atraumatic.    Eyes: EOM are normal.    Neck: Normal range of motion.    Cardiovascular: Normal rate.     Pulmonary/Chest: Effort normal. No respiratory distress.        Abdominal: Soft. She exhibits abdominal incision (Pfannanstiel incision, c/d/i, pad placed over incision). There is no tenderness. There is no rebound and no guarding.   Fundus firm below umbilicus             Musculoskeletal: Normal range of motion and moves all extremeties.       Neurological: She is alert and oriented to person, place, and time.    Skin: Skin is warm and dry.    Psychiatric: She has a normal mood and affect. Her behavior is normal. Judgment and thought content normal.

## 2018-03-03 NOTE — NURSING
Discharge instructions reviewed and given to pt. Pt denies any questions; verbalizes understanding. Pt states she will call on Monday to set up BP check appt with Dr. Lamb. Pt states she will walk to the NICU with FOB.

## 2018-03-03 NOTE — PROGRESS NOTES
[facility]  Obstetrics  Postpartum Progress Note    Patient Name: Kaylie Fuentes  MRN: [id number]  Admission Date: 2/27/2018  Hospital Length of Stay: 4 days  Attending Physician: Nancy Lamb MD  Primary Care Provider: Primary Doctor No    Subjective:     Principal Problem:Chronic hypertension with superimposed preeclampsia    Hospital course: 02/27/2018 Patient presented to [facility] with severe range BP of 172/122, repeat /100. Patient given 20/20/40 of labetalol IV push which brought BP down to 157/94. Magnesium started. preE labs collected. M notified. Admitted to antepartum for BP control.   02/28/2018 - POD #1 s/p RLTCS @ 2330, at GA 31w2d for chronic hypertension with SI pre-E. On Mag now for seizure prophylaxis. Doing well, pain controlled, meeting Post-op milestones. Surgery was <12 hours ago, so farias is still in place. BP controlled with oral medications - BP: (103-176)/() 144/91.  03/01/2018 - POD#2 s/p RLTCS. S/p Mag for seizure prophylaxis. Procardia 30XL, 400 labetalol BID. Blood pressures continued to be elevated throughout the day; procardia increased to 60XL to start in the morning. Denies pre-E ROS.  03/02/2018 - POD#3 s/p RLTCS. S/p Mag for seizure prophylaxis. Blood pressure is elevated this morning; will start procardia 60XL today. Otherwise, she is doing well this morning, meeting all postpartum goals (ambulating, pain well-controlled, tolerating reg diet without n/v, passing flatus, voiding spontaneously). She denies headaches, chest pain, SOB, vision changes, or RUQ pain.  03/03/2018 - POD #4. Stable on 90 Procardia XL and 400 mg labetalol BID. No new complaints. Meeting pp mile stones, stable for discharge    Interval History: POD#4 s/p RLTCS. S/p Mag sulfate for seizure prophylaxis. Stable on 90 Procardia XL and 400 mg labetalol BID. No new complaints. Meeting pp mile stones, stable for discharge    She is doing well this morning. She is tolerating a  regular diet without nausea or vomiting. She is voiding spontaneously. She is ambulating. She has passed flatus, and has a BM. Vaginal bleeding is mild. She denies fever or chills. Abdominal pain is mild and controlled with oral medications. She is pumping for her baby in the NICU. She denies headaches, chest pain, SOB, RUQ pain, or vision changes. No other complaints.    Objective:     Vital Signs (Most Recent):  Temp: 97.2 °F (36.2 °C) (03/03/18 0409)  Pulse: 76 (03/03/18 0415)  Resp: 18 (03/03/18 0005)  BP: (!) 155/84 (03/03/18 0415)  SpO2: 99 % (03/03/18 0005) Vital Signs (24h Range):  Temp:  [96.6 °F (35.9 °C)-97.2 °F (36.2 °C)] 97.2 °F (36.2 °C)  Pulse:  [71-94] 76  Resp:  [18] 18  SpO2:  [97 %-99 %] 99 %  BP: (143-183)/() 155/84     Weight: (!) 138.8 kg (306 lb)  Body mass index is 43.91 kg/m².    No intake or output data in the 24 hours ending 03/03/18 0848    Significant Labs:  Lab Results   Component Value Date    GROUPTRH O POS 02/27/2018    HEPBSAG Negative 01/24/2018    STREPBCULT No Group B Streptococcus isolated 02/27/2018     No results for input(s): HGB, HCT in the last 48 hours.    I have personallly reviewed all pertinent lab results from the last 24 hours.    Physical Exam:   Constitutional: She is oriented to person, place, and time. She appears well-developed and well-nourished.    HENT:   Head: Normocephalic and atraumatic.    Eyes: EOM are normal.    Neck: Normal range of motion.    Cardiovascular: Normal rate.     Pulmonary/Chest: Effort normal. No respiratory distress.        Abdominal: Soft. She exhibits abdominal incision (Pfannanstiel incision, c/d/i, pad placed over incision). There is no tenderness. There is no rebound and no guarding.   Fundus firm below umbilicus             Musculoskeletal: Normal range of motion and moves all extremeties.       Neurological: She is alert and oriented to person, place, and time.    Skin: Skin is warm and dry.    Psychiatric: She has a normal  mood and affect. Her behavior is normal. Judgment and thought content normal.       Assessment/Plan:     28 y.o. female  for:    * Chronic hypertension with superimposed preeclampsia    - CHTN with super imposed PreE with SF(BP)  - Patient with severe range BP in [facility] of 169/98>167/95>167/100>157/94  - s/p 20/20/40 of labetalol  - s/p IV magnesium with 6 g load, 2g/hr  - BMZ given  - now s/p RLTCS  BP: (143-183)/() 157/88   - Continued home labetalol 400mg BID and procardia increased to 90XL   - denies pre-E ROS at this time   - Labs on admission: CBC /217, CMP AST/ALT , P/C Ratio 0.25  - Will need follow up within one week for BP check         delivery delivered    Postpartum care:  - Patient doing well. Continue routine management and advances.  - Continue PO pain meds. Pain well controlled.  - Encourage ambulation.   - Heme: Pre Delivery h/h  --> Post Delivery h/h   - Circumcision - male infant is in the NICU   - Contraception - per primary OB  - Lactation - The patient is pumping for her baby in the NICU. Lactation nurse following along PRN  - Rh Status - pos            Disposition: As patient meets milestones, will plan to discharge today.    Ravi Lees MD  Obstetrics  [facility]    SEEN AND EXAMINED BY ME  AGREE WITH ABOVE    Carson Leavitt MD

## 2018-03-03 NOTE — ASSESSMENT & PLAN NOTE
- CHTN with super imposed PreE with SF(BP)  - Patient with severe range BP in [facility] of 169/98>167/95>167/100>157/94  - s/p 20/20/40 of labetalol  - s/p IV magnesium with 6 g load, 2g/hr  - BMZ given  - now s/p RLTCS  BP: (143-183)/() 157/88   - Continued home labetalol 400mg BID and procardia increased to 90XL   - denies pre-E ROS at this time   - Labs on admission: CBC 12/38/217, CMP AST/ALT 20/23, P/C Ratio 0.25  - Will need follow up within one week for BP check

## 2018-03-03 NOTE — PLAN OF CARE
Problem: Patient Care Overview  Goal: Plan of Care Review  Outcome: Ongoing (interventions implemented as appropriate)  Preparation and Hygiene:    1. Shower daily.  2. Wear a clean bra each day and wash daily in warm soapy water.  3. Change wet or moist breast pads frequently.  Moist pads can promote growth of germs.  4. Actively wash your hands, paying close attention to the area around and under your fingernails, thoroughly with soap and water for 15 seconds before pumping or handling your milk.  Re-wash your hands if you touch anything (scratching your nose, answering the phone, etc) while pumping or handling your milk.   5. Before pumping your breasts, assemble the pump collection kit and have ready the sterile container and labels.  Place these items on a clean surface next to the breastpump.  6. Each time after you have finished pumping, take apart all of the parts of the breastpump collection kit and place them in a separate cleaning container (do not place them in the sink).  Be sure to remove the yellow valve from the breastshield and separate the white membrane from the yellow valve.  Rinse all of these parts with cool water.  Then use a new sponge and/or bottle brush and dishwashing detergent to clean the parts.  Rinse off the soapy water with cool water and air dry on a clean towel covered with a clean cloth.  All parts may also be washed after each use in the top rack of a .  7. Once each day, sterilize all of the parts of the breastpump collection kit.  This can be done by boiling the kit parts for 10 minutes or by using a Quick Clean Micro-Steam Bag made by [employer]  8. If condensation appears in the tubing, continue to run the pump with the tubing attached for 1-2 minutes or until the tubing is dry.   9. Notify your babys nurse or doctor if you become ill or need to take any medication, even over-the-counter medicines.        Collection and Storage of Expressed Breastmilk:          1. Pump your breasts at least 8-10 times every 24 hours.  Double pump (both breasts at  the same time) for at least 15-20 minutes using the most suction that is comfortable.    2. Write the date and time of pumping and the name of any medications you are takingon the babys pre-printed hospital identification label.   3. Place your babys pre-printed hospital identification label on each container of breastmilk.  Additional pre-printed labels can be obtained from your babys nurse.  If your expressed breastmilk is not correctly labeled, the nurse cannot feed the milk to the baby.       4. Place a brightly colored sticker on the top of each container of milk pumped during the first 30 days.  This identifies the milk as special and having higher levels of nutrients and anti-infective properties that are so important for your baby.  Additional stickers can be obtained from the lactation consultants or your babys nurse.  5.   Do not touch the inside of the storage containers or lids.  6.      Pour the amount of expressed milk needed for 1 of your babys feedings into each   storage container. Use a new container(s) for each pumping.  Additional storage   containers can be obtained from your babys nurse.        7.       Tightly screw the lid onto the container and place immediately into the       refrigerator fordaily transportation to the hospital.   Do not freeze your milk      unless asked to do so by your babys nurse.  However, if you are not able to      visit your baby each day, place the expressed breastmilk in the freezer.  8.   Expressed breastmilk should be refrigerated or frozen within 1 hour of      pumping.  9.      Do not store expressed breastmilk on the door of your refrigerator or freezer where the temperature is warmer.         Transportation of Expressed Breastmilk:    1. Refrigerated breastmilk or frozen milk should be packed tightly together in a cooler with frozen, blue gel-packs to keep the  milk frozen.  DO NOT USE ICE CUBES (WET ICE) TO TRANSPORT FROZEN MILK.   A clean towel can be used to fill any extra space between containers of frozen milk.  2.    Bring your expressed milk from home each time you visit the baby.

## 2018-03-04 ENCOUNTER — PATIENT MESSAGE (OUTPATIENT)
Dept: OBSTETRICS AND GYNECOLOGY | Facility: CLINIC | Age: 28
End: 2018-03-04

## 2018-03-05 ENCOUNTER — TELEPHONE (OUTPATIENT)
Dept: OBSTETRICS AND GYNECOLOGY | Facility: CLINIC | Age: 28
End: 2018-03-05

## 2018-03-05 NOTE — TELEPHONE ENCOUNTER
----- Message from Ashley Mc sent at 3/5/2018  2:04 PM CST -----  Contact: Tyrese Corona  x_ 1st Request  _ 2nd Request  _ 3rd Request    Who: Tyrese Corona    Why: is needing clarity on the patient type of delivery    What Number to Call Back: [phone]    When to Expect a call back: (Before the end of the day)  -- if call after 3:00 call back will be tomorrow.

## 2018-03-07 ENCOUNTER — TELEPHONE (OUTPATIENT)
Dept: OBSTETRICS AND GYNECOLOGY | Facility: CLINIC | Age: 28
End: 2018-03-07

## 2018-03-07 NOTE — TELEPHONE ENCOUNTER
----- Message from Stephanie Skaggs MA sent at 3/5/2018  3:55 PM CST -----  ----- Message from Ashley Mc sent at 3/5/2018  2:04 PM CST -----  Contact: Tyrese Corona  x_ 1st Request  _ 2nd Request  _ 3rd Request     Who: Tyrese Corona     Why: is needing clarity on the patient type of delivery     What Number to Call Back: [phone]     When to Expect a call back: (Before the end of the day)  -- if call after 3:00 call back will be tomorrow.

## 2018-03-10 ENCOUNTER — PATIENT MESSAGE (OUTPATIENT)
Dept: OBSTETRICS AND GYNECOLOGY | Facility: CLINIC | Age: 28
End: 2018-03-10

## 2018-03-12 ENCOUNTER — PATIENT MESSAGE (OUTPATIENT)
Dept: OBSTETRICS AND GYNECOLOGY | Facility: CLINIC | Age: 28
End: 2018-03-12

## 2018-03-14 ENCOUNTER — POSTPARTUM VISIT (OUTPATIENT)
Dept: OBSTETRICS AND GYNECOLOGY | Facility: CLINIC | Age: 28
End: 2018-03-14
Payer: MEDICAID

## 2018-03-14 VITALS
WEIGHT: 293 LBS | SYSTOLIC BLOOD PRESSURE: 136 MMHG | HEIGHT: 70 IN | DIASTOLIC BLOOD PRESSURE: 96 MMHG | BODY MASS INDEX: 41.95 KG/M2

## 2018-03-14 PROCEDURE — 99999 PR PBB SHADOW E&M-EST. PATIENT-LVL II: CPT | Mod: PBBFAC,,, | Performed by: NURSE PRACTITIONER

## 2018-03-14 PROCEDURE — 99212 OFFICE O/P EST SF 10 MIN: CPT | Mod: PBBFAC | Performed by: NURSE PRACTITIONER

## 2018-03-15 NOTE — PROGRESS NOTES
HPI: Patient is here for a postpartum visit/ incision check. She is 2 weeks postpartum following a low cervical transverse  section, of a female infant weighinlb 12.4oz.   The delivery was at 31w 2d.  Pregnancy was complicated by: history of previous , chronic HTN, preE. Pt presents today complaining of clear drainage one week ago that has since stopped. Pt denies associated redness, warmth, and no associated fever.    OB History    Para Term  AB Living   2 2 1 1   2   SAB TAB Ectopic Multiple Live Births         0 2      # Outcome Date GA Lbr Aiden/2nd Weight Sex Delivery Anes PTL Lv   2  18 31w2d  1.26 kg (2 lb 12.4 oz) M CS-LTranv EPI, Spinal  [facility]   1 Term 11 39w0d  2.722 kg (6 lb) M CS-LTranv  N [facility]AL[facility]        ROS:  GENERAL: No fever, chills, fatigability.  ABDOMEN: No abdominal pain. Denies nausea. Denies vomiting. No diarrhea. No constipation.  Reports Incision has been oozing clear drainage  BREAST: Denies pain. No lumps. No discharge.  URINARY: No incontinence, no nocturia, no frequency and no dysuria.  CARDIOVASCULAR: No chest pain. No shortness of breath. No leg cramps.  NEUROLOGICAL: No headaches. No vision changes.      General appearance - alert, well appearing, and in no distress  Skin - coloration normal for race, good turgor, warm to touch, no rashes  Abdomen - soft, nontender, nondistended, no masses or organomegaly  Pfannenstiel incision: Clean, dry, intact - healing well. Wound edges approximately. No wound opening. No drainage or erythema       PLAN:  Discussed signs and symptoms of infection including increased redness, warmth, pain, oozing, and fever. Discussed to keep incision clean and dry.   Return for regular post partum in 4-6 weeks

## 2018-03-26 ENCOUNTER — PATIENT MESSAGE (OUTPATIENT)
Dept: OBSTETRICS AND GYNECOLOGY | Facility: CLINIC | Age: 28
End: 2018-03-26

## 2018-04-04 ENCOUNTER — PATIENT MESSAGE (OUTPATIENT)
Dept: OBSTETRICS AND GYNECOLOGY | Facility: CLINIC | Age: 28
End: 2018-04-04

## 2018-04-09 ENCOUNTER — ROUTINE PRENATAL (OUTPATIENT)
Dept: OBSTETRICS AND GYNECOLOGY | Facility: CLINIC | Age: 28
End: 2018-04-09
Payer: MEDICAID

## 2018-04-09 VITALS — BODY MASS INDEX: 42.32 KG/M2 | WEIGHT: 293 LBS | DIASTOLIC BLOOD PRESSURE: 90 MMHG | SYSTOLIC BLOOD PRESSURE: 140 MMHG

## 2018-04-09 DIAGNOSIS — N92.6 IRREGULAR BLEEDING: ICD-10-CM

## 2018-04-09 PROCEDURE — 0503F POSTPARTUM CARE VISIT: CPT | Mod: TH,,, | Performed by: OBSTETRICS & GYNECOLOGY

## 2018-04-09 PROCEDURE — 99999 PR PBB SHADOW E&M-EST. PATIENT-LVL III: CPT | Mod: PBBFAC,,, | Performed by: OBSTETRICS & GYNECOLOGY

## 2018-04-09 PROCEDURE — 99213 OFFICE O/P EST LOW 20 MIN: CPT | Mod: PBBFAC | Performed by: OBSTETRICS & GYNECOLOGY

## 2018-04-09 RX ORDER — ACETAMINOPHEN AND CODEINE PHOSPHATE 120; 12 MG/5ML; MG/5ML
1 SOLUTION ORAL DAILY
Qty: 28 TABLET | Refills: 11 | Status: ON HOLD | OUTPATIENT
Start: 2018-04-09 | End: 2019-09-16 | Stop reason: HOSPADM

## 2018-04-09 NOTE — PROGRESS NOTES
CC: Post-partum follow-up    Kaylie Fuentes is a 28 y.o. female  who presents for post-partum visit.  She is S/P a Repeat CS for severe PRE E and transverse lie at 31w 2d.  She and the baby are doing well.  No pain.  No fever.   No bowel / bladder complaints.  She is having vaginal bleeding.  Trying to breast feed.  She is on labetalol for CHTN.  NO PRE E sx.  BP under control      Breast Feeding: YES  Depression: NO  Contraception:  micronor Rx    Pregnancy was complicated by:  CHTN    BP (!) 140/90   Wt 133.8 kg (294 lb 15.6 oz)   LMP 2017   BMI 42.32 kg/m²     ROS:  GENERAL: No fever, chills, fatigability.  VULVAR: No pain, no lesions and no itching.  VAGINAL: No relaxation, no itching, no discharge, no abnormal bleeding and no lesions.  ABDOMEN: No abdominal pain. Denies nausea. Denies vomiting. No diarrhea. No constipation  BREAST: Denies pain. No lumps.  URINARY: No incontinence, no nocturia, no frequency and no dysuria.  CARDIOVASCULAR: No chest pain. No shortness of breath. No leg cramps.  NEUROLOGICAL: No headaches. No vision changes.    PHYSICAL EXAM:  Exam chaperoned by nurse  ABDOMEN:  Soft, non-tender, non-distended  VULVA:  Normal, no lesions  CERVIX:  Without lesions, polyps or tenderness.  UTERUS:  Normal size, shape, consistency, no mass or tenderness.  ADNEXA:  Normal in size without mass or tenderness    IMP:  Doing well S/P REPEAT CS  Instructions / precautions reviewed  Contraceptive counseling    Rx micronor      PLAN:PELVIC US for irregular bleeding  May resume normal activities  Return: F/U after US  NEEDS PCP F/U for HTN

## 2018-10-28 NOTE — TELEPHONE ENCOUNTER
Called patient. No answer. Message stated the the person I am trying to reach is out of the office.    room air

## 2019-06-15 NOTE — ANESTHESIA POSTPROCEDURE EVALUATION
"Anesthesia Post Evaluation    Patient: Kaylie Fuentes    Procedure(s) Performed: Procedure(s) (LRB):  DELIVERY- SECTION (N/A)    Final Anesthesia Type: CSE  Patient location during evaluation: floor  Patient participation: Yes- Able to Participate  Level of consciousness: awake and alert and oriented  Post-procedure vital signs: reviewed and stable  Pain management: adequate  Airway patency: patent  PONV status at discharge: No PONV  Anesthetic complications: no      Cardiovascular status: hemodynamically stable, blood pressure returned to baseline and stable  Respiratory status: unassisted, spontaneous ventilation and room air  Hydration status: euvolemic  Follow-up not needed.        Visit Vitals  BP (!) 156/87 (BP Location: Left arm, Patient Position: Lying)   Pulse 82   Temp 36.7 °C (98 °F) (Temporal)   Resp 18   Ht 5' 10" (1.778 m)   Wt (!) 138.8 kg (306 lb)   LMP 2017   SpO2 98%   Breastfeeding? Unknown   BMI 43.91 kg/m²       Pain/Bryan Score: Pain Rating Prior to Med Admin: 0 (2018 12:46 PM)  Pain Rating Post Med Admin: 6 (2018  7:17 PM)      "
[id number] Detailed

## 2019-09-13 ENCOUNTER — HOSPITAL ENCOUNTER (INPATIENT)
Facility: HOSPITAL | Age: 29
LOS: 3 days | Discharge: HOME | DRG: 305 | End: 2019-09-16
Attending: EMERGENCY MEDICINE | Admitting: EMERGENCY MEDICINE
Payer: MEDICAID

## 2019-09-13 DIAGNOSIS — L01.00 IMPETIGO: ICD-10-CM

## 2019-09-13 DIAGNOSIS — N17.9 AKI (ACUTE KIDNEY INJURY): ICD-10-CM

## 2019-09-13 DIAGNOSIS — T14.8XXA HEMATOMA: ICD-10-CM
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
## 2019-09-13 DIAGNOSIS — I10 HYPERTENSION: ICD-10-CM

## 2019-09-13 DIAGNOSIS — S62.357A CLOSED NONDISPLACED FRACTURE OF SHAFT OF FIFTH METACARPAL BONE OF LEFT HAND, INITIAL ENCOUNTER: Primary | ICD-10-CM
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
## 2019-09-13 DIAGNOSIS — S09.90XA CLOSED HEAD INJURY, INITIAL ENCOUNTER: ICD-10-CM

## 2019-09-13 DIAGNOSIS — S52.572A OTHER CLOSED INTRA-ARTICULAR FRACTURE OF DISTAL END OF LEFT RADIUS, INITIAL ENCOUNTER: ICD-10-CM

## 2019-09-13 DIAGNOSIS — I10 HYPERTENSION, UNSPECIFIED TYPE: ICD-10-CM

## 2019-09-13 PROBLEM — S52.502A CLOSED FRACTURE OF DISTAL END OF LEFT RADIUS: Status: ACTIVE | Noted: 2019-09-13

## 2019-09-13 PROBLEM — I16.1 HYPERTENSIVE EMERGENCY: Status: ACTIVE | Noted: 2019-09-13

## 2019-09-13 PROBLEM — E66.01 CLASS 2 SEVERE OBESITY DUE TO EXCESS CALORIES WITH SERIOUS COMORBIDITY AND BODY MASS INDEX (BMI) OF 38.0 TO 38.9 IN ADULT: Chronic | Status: ACTIVE | Noted: 2019-09-13

## 2019-09-13 PROBLEM — E66.812 CLASS 2 SEVERE OBESITY DUE TO EXCESS CALORIES WITH SERIOUS COMORBIDITY AND BODY MASS INDEX (BMI) OF 38.0 TO 38.9 IN ADULT: Chronic | Status: ACTIVE | Noted: 2019-09-13

## 2019-09-13 PROBLEM — S62.307A CLOSED FRACTURE OF FIFTH METACARPAL BONE OF LEFT HAND: Status: ACTIVE | Noted: 2019-09-13

## 2019-09-13 LAB
AMORPH CRY URNS QL MICRO: NORMAL
ANION GAP SERPL CALC-SCNC: 11 MMOL/L (ref 8–16)
AORTIC ROOT ANNULUS: 3.31 CM
AORTIC VALVE CUSP SEPERATION: 1.88 CM
ASCENDING AORTA: 3.31 CM
AV INDEX (PROSTH): 0.92
AV MEAN GRADIENT: 3 MMHG
AV PEAK GRADIENT: 5 MMHG
AV VALVE AREA: 3.91 CM2
AV VELOCITY RATIO: 0.99
B-HCG UR QL: NEGATIVE
BACTERIA #/AREA URNS HPF: NORMAL /HPF
BILIRUB UR QL STRIP: NEGATIVE
BSA FOR ECHO PROCEDURE: 2.44 M2
BUN SERPL-MCNC: 18 MG/DL (ref 6–20)
CALCIUM SERPL-MCNC: 9.3 MG/DL (ref 8.7–10.5)
CHLORIDE SERPL-SCNC: 102 MMOL/L (ref 95–110)
CLARITY UR: ABNORMAL
CO2 SERPL-SCNC: 26 MMOL/L (ref 23–29)
COLOR UR: YELLOW
CREAT SERPL-MCNC: 1.6 MG/DL (ref 0.5–1.4)
CTP QC/QA: YES
CV ECHO LV RWT: 0.79 CM
DOP CALC AO PEAK VEL: 1.16 M/S
DOP CALC AO VTI: 24.55 CM
DOP CALC LVOT AREA: 4.3 CM2
DOP CALC LVOT DIAMETER: 2.33 CM
DOP CALC LVOT PEAK VEL: 1.15 M/S
DOP CALC LVOT STROKE VOLUME: 95.93 CM3
DOP CALCLVOT PEAK VEL VTI: 22.51 CM
E WAVE DECELERATION TIME: 136.17 MSEC
E/A RATIO: 1.02
ECHO LV POSTERIOR WALL: 1.73 CM (ref 0.6–1.1)
EST. GFR  (AFRICAN AMERICAN): 50 ML/MIN/1.73 M^2
EST. GFR  (NON AFRICAN AMERICAN): 43 ML/MIN/1.73 M^2
FRACTIONAL SHORTENING: 36 % (ref 28–44)
GLUCOSE SERPL-MCNC: 93 MG/DL (ref 70–110)
GLUCOSE UR QL STRIP: ABNORMAL
HGB UR QL STRIP: NEGATIVE
HYALINE CASTS #/AREA URNS LPF: 0 /LPF
INTERVENTRICULAR SEPTUM: 1.71 CM (ref 0.6–1.1)
IVRT: 0.1 MSEC
KETONES UR QL STRIP: NEGATIVE
LA MAJOR: 5.82 CM
LA MINOR: 5.81 CM
LA WIDTH: 5.01 CM
LEFT ATRIUM SIZE: 4.04 CM
LEFT ATRIUM VOLUME INDEX: 42.5 ML/M2
LEFT ATRIUM VOLUME: 100.04 CM3
LEFT INTERNAL DIMENSION IN SYSTOLE: 2.77 CM (ref 2.1–4)
LEFT VENTRICLE DIASTOLIC VOLUME INDEX: 36.39 ML/M2
LEFT VENTRICLE DIASTOLIC VOLUME: 85.6 ML
LEFT VENTRICLE MASS INDEX: 139 G/M2
LEFT VENTRICLE SYSTOLIC VOLUME INDEX: 12.3 ML/M2
LEFT VENTRICLE SYSTOLIC VOLUME: 28.87 ML
LEFT VENTRICULAR INTERNAL DIMENSION IN DIASTOLE: 4.36 CM (ref 3.5–6)
LEFT VENTRICULAR MASS: 326.47 G
LEUKOCYTE ESTERASE UR QL STRIP: NEGATIVE
MICROSCOPIC COMMENT: NORMAL
MV PEAK A VEL: 1.23 M/S
MV PEAK E VEL: 1.25 M/S
NITRITE UR QL STRIP: NEGATIVE
PH UR STRIP: 6 [PH] (ref 5–8)
PISA TR MAX VEL: 2.02 M/S
POTASSIUM SERPL-SCNC: 3.3 MMOL/L (ref 3.5–5.1)
PROT UR QL STRIP: ABNORMAL
PV PEAK VELOCITY: 1.12 CM/S
RA MAJOR: 5.08 CM
RA PRESSURE: 8 MMHG
RA WIDTH: 3.27 CM
RBC #/AREA URNS HPF: 1 /HPF (ref 0–4)
RIGHT VENTRICULAR END-DIASTOLIC DIMENSION: 3 CM
RV TISSUE DOPPLER FREE WALL SYSTOLIC VELOCITY 1 (APICAL 4 CHAMBER VIEW): 11.63 CM/S
SINUS: 3.5 CM
SODIUM SERPL-SCNC: 139 MMOL/L (ref 136–145)
SP GR UR STRIP: 1.01 (ref 1–1.03)
SQUAMOUS #/AREA URNS HPF: 20 /HPF
STJ: 3.18 CM
TR MAX PG: 16 MMHG
TRICUSPID ANNULAR PLANE SYSTOLIC EXCURSION: 2.24 CM
TV REST PULMONARY ARTERY PRESSURE: 24 MMHG
URN SPEC COLLECT METH UR: ABNORMAL
UROBILINOGEN UR STRIP-ACNC: NEGATIVE EU/DL
WBC #/AREA URNS HPF: 3 /HPF (ref 0–5)

## 2019-09-13 PROCEDURE — 25000003 PHARM REV CODE 250: Performed by: NURSE PRACTITIONER

## 2019-09-13 PROCEDURE — 25000003 PHARM REV CODE 250: Performed by: EMERGENCY MEDICINE

## 2019-09-13 PROCEDURE — 29125 APPL SHORT ARM SPLINT STATIC: CPT | Mod: LT

## 2019-09-13 PROCEDURE — 80048 BASIC METABOLIC PNL TOTAL CA: CPT

## 2019-09-13 PROCEDURE — 96374 THER/PROPH/DIAG INJ IV PUSH: CPT

## 2019-09-13 PROCEDURE — 20000000 HC ICU ROOM

## 2019-09-13 PROCEDURE — 96376 TX/PRO/DX INJ SAME DRUG ADON: CPT

## 2019-09-13 PROCEDURE — 96375 TX/PRO/DX INJ NEW DRUG ADDON: CPT

## 2019-09-13 PROCEDURE — 63600175 PHARM REV CODE 636 W HCPCS: Performed by: HOSPITALIST

## 2019-09-13 PROCEDURE — 93010 ELECTROCARDIOGRAM REPORT: CPT | Mod: ,,, | Performed by: INTERNAL MEDICINE

## 2019-09-13 PROCEDURE — 25000003 PHARM REV CODE 250: Performed by: HOSPITALIST

## 2019-09-13 PROCEDURE — 80307 DRUG TEST PRSMV CHEM ANLYZR: CPT

## 2019-09-13 PROCEDURE — 93010 EKG 12-LEAD: ICD-10-PCS | Mod: ,,, | Performed by: INTERNAL MEDICINE

## 2019-09-13 PROCEDURE — 99285 EMERGENCY DEPT VISIT HI MDM: CPT | Mod: 25

## 2019-09-13 PROCEDURE — 81000 URINALYSIS NONAUTO W/SCOPE: CPT | Mod: 59

## 2019-09-13 PROCEDURE — 81025 URINE PREGNANCY TEST: CPT | Performed by: EMERGENCY MEDICINE

## 2019-09-13 PROCEDURE — 63600175 PHARM REV CODE 636 W HCPCS: Performed by: EMERGENCY MEDICINE

## 2019-09-13 PROCEDURE — 63600175 PHARM REV CODE 636 W HCPCS: Performed by: NURSE PRACTITIONER

## 2019-09-13 PROCEDURE — 93005 ELECTROCARDIOGRAM TRACING: CPT

## 2019-09-13 RX ORDER — HYDROCHLOROTHIAZIDE 25 MG/1
25 TABLET ORAL
Status: COMPLETED | OUTPATIENT
Start: 2019-09-13 | End: 2019-09-13

## 2019-09-13 RX ORDER — ONDANSETRON 2 MG/ML
4 INJECTION INTRAMUSCULAR; INTRAVENOUS
Status: COMPLETED | OUTPATIENT
Start: 2019-09-13 | End: 2019-09-13

## 2019-09-13 RX ORDER — LABETALOL 100 MG/1
200 TABLET, FILM COATED ORAL
Status: COMPLETED | OUTPATIENT
Start: 2019-09-13 | End: 2019-09-13

## 2019-09-13 RX ORDER — IBUPROFEN 600 MG/1
800 TABLET ORAL EVERY 6 HOURS PRN
Qty: 20 TABLET | Refills: 0 | Status: ON HOLD | OUTPATIENT
Start: 2019-09-13 | End: 2019-09-16 | Stop reason: HOSPADM

## 2019-09-13 RX ORDER — PROCHLORPERAZINE EDISYLATE 5 MG/ML
10 INJECTION INTRAMUSCULAR; INTRAVENOUS EVERY 6 HOURS PRN
Status: DISCONTINUED | OUTPATIENT
Start: 2019-09-13 | End: 2019-09-16 | Stop reason: HOSPADM

## 2019-09-13 RX ORDER — ACETAMINOPHEN 325 MG/1
650 TABLET ORAL EVERY 8 HOURS PRN
Status: DISCONTINUED | OUTPATIENT
Start: 2019-09-13 | End: 2019-09-13

## 2019-09-13 RX ORDER — SODIUM CHLORIDE, SODIUM LACTATE, POTASSIUM CHLORIDE, CALCIUM CHLORIDE 600; 310; 30; 20 MG/100ML; MG/100ML; MG/100ML; MG/100ML
1000 INJECTION, SOLUTION INTRAVENOUS
Status: COMPLETED | OUTPATIENT
Start: 2019-09-13 | End: 2019-09-13

## 2019-09-13 RX ORDER — HYDRALAZINE HYDROCHLORIDE 20 MG/ML
10 INJECTION INTRAMUSCULAR; INTRAVENOUS
Status: COMPLETED | OUTPATIENT
Start: 2019-09-13 | End: 2019-09-13

## 2019-09-13 RX ORDER — LOSARTAN POTASSIUM 25 MG/1
100 TABLET ORAL DAILY
Status: DISCONTINUED | OUTPATIENT
Start: 2019-09-13 | End: 2019-09-15

## 2019-09-13 RX ORDER — HYDROCODONE BITARTRATE AND ACETAMINOPHEN 5; 325 MG/1; MG/1
1 TABLET ORAL EVERY 4 HOURS PRN
Status: DISCONTINUED | OUTPATIENT
Start: 2019-09-13 | End: 2019-09-16 | Stop reason: HOSPADM

## 2019-09-13 RX ORDER — ACETAMINOPHEN 325 MG/1
650 TABLET ORAL EVERY 6 HOURS PRN
Status: DISCONTINUED | OUTPATIENT
Start: 2019-09-13 | End: 2019-09-16 | Stop reason: HOSPADM

## 2019-09-13 RX ORDER — LABETALOL 100 MG/1
400 TABLET, FILM COATED ORAL 2 TIMES DAILY
Status: DISCONTINUED | OUTPATIENT
Start: 2019-09-13 | End: 2019-09-14

## 2019-09-13 RX ORDER — HYDRALAZINE HYDROCHLORIDE 20 MG/ML
10 INJECTION INTRAMUSCULAR; INTRAVENOUS EVERY 8 HOURS PRN
Status: DISCONTINUED | OUTPATIENT
Start: 2019-09-13 | End: 2019-09-15

## 2019-09-13 RX ORDER — ONDANSETRON 2 MG/ML
4 INJECTION INTRAMUSCULAR; INTRAVENOUS EVERY 8 HOURS PRN
Status: DISCONTINUED | OUTPATIENT
Start: 2019-09-13 | End: 2019-09-13

## 2019-09-13 RX ORDER — HYDRALAZINE HYDROCHLORIDE 20 MG/ML
10 INJECTION INTRAMUSCULAR; INTRAVENOUS EVERY 4 HOURS PRN
Status: DISCONTINUED | OUTPATIENT
Start: 2019-09-13 | End: 2019-09-13 | Stop reason: SDUPTHER

## 2019-09-13 RX ORDER — HYDROCODONE BITARTRATE AND ACETAMINOPHEN 5; 325 MG/1; MG/1
1 TABLET ORAL EVERY 4 HOURS PRN
Qty: 18 TABLET | Refills: 0 | Status: ON HOLD | OUTPATIENT
Start: 2019-09-13 | End: 2019-09-16 | Stop reason: HOSPADM

## 2019-09-13 RX ORDER — NICARDIPINE HYDROCHLORIDE 0.2 MG/ML
5 INJECTION INTRAVENOUS CONTINUOUS
Status: DISCONTINUED | OUTPATIENT
Start: 2019-09-13 | End: 2019-09-14

## 2019-09-13 RX ORDER — AMLODIPINE BESYLATE 5 MG/1
10 TABLET ORAL DAILY
Status: DISCONTINUED | OUTPATIENT
Start: 2019-09-13 | End: 2019-09-14

## 2019-09-13 RX ORDER — MORPHINE SULFATE 10 MG/ML
4 INJECTION INTRAMUSCULAR; INTRAVENOUS; SUBCUTANEOUS
Status: COMPLETED | OUTPATIENT
Start: 2019-09-13 | End: 2019-09-13

## 2019-09-13 RX ORDER — CLONIDINE 0.2 MG/24H
1 PATCH, EXTENDED RELEASE TRANSDERMAL
Status: DISCONTINUED | OUTPATIENT
Start: 2019-09-13 | End: 2019-09-16 | Stop reason: HOSPADM

## 2019-09-13 RX ORDER — MUPIROCIN 20 MG/G
OINTMENT TOPICAL 3 TIMES DAILY
Qty: 2 G | Refills: 0 | Status: ON HOLD | OUTPATIENT
Start: 2019-09-13 | End: 2019-09-16 | Stop reason: HOSPADM

## 2019-09-13 RX ORDER — ONDANSETRON 2 MG/ML
4 INJECTION INTRAMUSCULAR; INTRAVENOUS EVERY 6 HOURS PRN
Status: DISCONTINUED | OUTPATIENT
Start: 2019-09-13 | End: 2019-09-16 | Stop reason: HOSPADM

## 2019-09-13 RX ORDER — MORPHINE SULFATE 10 MG/ML
6 INJECTION INTRAMUSCULAR; INTRAVENOUS; SUBCUTANEOUS
Status: COMPLETED | OUTPATIENT
Start: 2019-09-13 | End: 2019-09-13

## 2019-09-13 RX ORDER — CLONIDINE HYDROCHLORIDE 0.1 MG/1
0.2 TABLET ORAL
Status: COMPLETED | OUTPATIENT
Start: 2019-09-13 | End: 2019-09-13

## 2019-09-13 RX ORDER — SODIUM CHLORIDE 0.9 % (FLUSH) 0.9 %
10 SYRINGE (ML) INJECTION
Status: DISCONTINUED | OUTPATIENT
Start: 2019-09-13 | End: 2019-09-16 | Stop reason: HOSPADM

## 2019-09-13 RX ADMIN — ONDANSETRON 4 MG: 2 INJECTION INTRAMUSCULAR; INTRAVENOUS at 11:09

## 2019-09-13 RX ADMIN — SODIUM CHLORIDE, SODIUM LACTATE, POTASSIUM CHLORIDE, AND CALCIUM CHLORIDE 1000 ML: .6; .31; .03; .02 INJECTION, SOLUTION INTRAVENOUS at 02:09

## 2019-09-13 RX ADMIN — HYDRALAZINE HYDROCHLORIDE 10 MG: 20 INJECTION INTRAMUSCULAR; INTRAVENOUS at 10:09

## 2019-09-13 RX ADMIN — HYDRALAZINE HYDROCHLORIDE 10 MG: 20 INJECTION INTRAMUSCULAR; INTRAVENOUS at 02:09

## 2019-09-13 RX ADMIN — NICARDIPINE HYDROCHLORIDE 5 MG/HR: 0.2 INJECTION, SOLUTION INTRAVENOUS at 05:09

## 2019-09-13 RX ADMIN — MORPHINE SULFATE 4 MG: 10 INJECTION INTRAVENOUS at 07:09

## 2019-09-13 RX ADMIN — CLONIDINE HYDROCHLORIDE 0.2 MG: 0.1 TABLET ORAL at 10:09

## 2019-09-13 RX ADMIN — LOSARTAN POTASSIUM 100 MG: 25 TABLET, FILM COATED ORAL at 01:09

## 2019-09-13 RX ADMIN — LABETALOL HYDROCHLORIDE 400 MG: 100 TABLET, FILM COATED ORAL at 09:09

## 2019-09-13 RX ADMIN — AMLODIPINE BESYLATE 10 MG: 5 TABLET ORAL at 01:09

## 2019-09-13 RX ADMIN — NICARDIPINE HYDROCHLORIDE 12.5 MG/HR: 0.2 INJECTION, SOLUTION INTRAVENOUS at 08:09

## 2019-09-13 RX ADMIN — HYDRALAZINE HYDROCHLORIDE 10 MG: 20 INJECTION INTRAMUSCULAR; INTRAVENOUS at 11:09

## 2019-09-13 RX ADMIN — LABETALOL HYDROCHLORIDE 200 MG: 100 TABLET, FILM COATED ORAL at 09:09

## 2019-09-13 RX ADMIN — PROCHLORPERAZINE EDISYLATE 10 MG: 5 INJECTION INTRAMUSCULAR; INTRAVENOUS at 02:09

## 2019-09-13 RX ADMIN — HYDROCHLOROTHIAZIDE 25 MG: 25 TABLET ORAL at 11:09

## 2019-09-13 RX ADMIN — MORPHINE SULFATE 4 MG: 10 INJECTION INTRAVENOUS at 01:09

## 2019-09-13 RX ADMIN — CLONIDINE 1 PATCH: 0.2 PATCH TRANSDERMAL at 11:09

## 2019-09-13 RX ADMIN — MORPHINE SULFATE 6 MG: 10 INJECTION INTRAVENOUS at 08:09

## 2019-09-13 NOTE — PROGRESS NOTES
WRITTEN HEALTHCARE DISCHARGE INFORMATION      Things that YOU are RESPONSIBLE for to Manage Your Care At Home:     1. Getting your prescriptions filled.  2. Taking you medications as directed. DO NOT MISS ANY DOSES!  3. Going to your follow-up doctor appointments. This is important because it allows the doctor to monitor your progress and to determine if any changes need to be made to your treatment plan.     If you are unable to make your follow up appointments, please call the number listed and reschedule this appointment.      ____________HELP AT HOME____________________     Experiencing any SIGNS or SYMPTOMS: YOU CAN     Schedule a same day appopintment with your Primary Care Doctor or  you can call [facility] On Call Nurse Care Line for 24/7 assistance at [phone]     If you are experience any signs or symptoms that have become severe, Call 911 and come to your nearest Emergency Room.     Thank you for choosing [facility] and allowing us to care for you.   From your care management team:      You should receive a call from [facility] within 48-72 hours to help manage your care after discharge. Please try to make sure that you answer your phone for this important phone call.    Follow-up Information     [facility]. Schedule an appointment as soon as possible for a visit in 1 week.    Specialties:  Surgical Oncology, Orthopedic Surgery, Genetics, Physical Medicine and Rehabilitation, Occupational Therapy, Radiology  Why:  To recheck your symptoms  Contact information:  [address] [facility] [address]  [phone]             [facility].    Specialty:  Emergency Medicine  Why:  As needed, If symptoms worsen  Contact information:  [address]  [facility] [id number] [phone]           Josefina Crowe MD On 9/20/2019.    Specialty:  Family Medicine  Why:  Appointment scheduled for Friday September 20th at 10am  Contact  information:  [address] [place] [address]  [phone]
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
## 2019-09-13 NOTE — NURSING
Pt arrived on unit from the ED dept [facility] w/c accompanied per transport and family. Pt AAOx4 with no c/o pain. Saline lock in place to site is clear. Pt  Admission assessment in progress, pt informed of md orders, oriented to environment and safety maintained with bed low side rails up x2 with nurse call bell within reach

## 2019-09-13 NOTE — ASSESSMENT & PLAN NOTE
BMI Body mass index is 38.02 kg/m².  Patient counseled on risks obesity imparts on overall health. Urged toward diet and lifestyle modifications to aid in weight reduction.

## 2019-09-13 NOTE — ED NOTES
Patient is resting comfortably. After pain medication of Morphine 4 mg IV given.  Pt reports pain of 10/10 prior pain medication administration and reports pain 6/10 post pain medication.  Family at bedside. Will continue to monitor.

## 2019-09-13 NOTE — ASSESSMENT & PLAN NOTE
Noted to be markedly hypertensive with evidence of end-organ damage, acute kidney injury, proteinurea.  Takes only labetalol at home.  Reports her blood pressures usually in the 160s.    -SBP still greater than 200 despite multiple antihypertensives  -p.r.n.  IV hydralazine 10 mg given x1, if ineffective will initiate nicardipine drip and transfer to ICU
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
## 2019-09-13 NOTE — ASSESSMENT & PLAN NOTE
Creatinine 1.6 with protein urea, last known baseline 0.8 to 0.9.  -blood pressure control  -consult Nephrology

## 2019-09-13 NOTE — PLAN OF CARE
09/13/19 1424   Discharge Assessment   Assessment Type Discharge Planning Assessment   Assessment information obtained from? Medical Record   Prior to hospitilization cognitive status: Alert/Oriented   Prior to hospitalization functional status: Independent   Current cognitive status: Alert/Oriented   Current Functional Status: Independent   Facility Arrived From: home   Lives With other (see comments)   Able to Return to Prior Arrangements yes   Is patient able to care for self after discharge? Yes   Who are your caregiver(s) and their phone number(s)? [facility] [phone]   Patient's perception of discharge disposition home or selfcare   Readmission Within the Last 30 Days no previous admission in last 30 days   Patient currently being followed by outpatient case management? No   Patient currently receives any other outside agency services? No   Equipment Currently Used at Home none   Do you have any problems affording any of your prescribed medications? No   Is the patient taking medications as prescribed? yes   Does the patient have transportation home? Yes   Transportation Anticipated family or friend will provide   Does the patient receive services at the [facility]? No   Discharge Plan A Home with family  (with follow up )   Discharge Plan B Home with family   DME Needed Upon Discharge  none   Patient/Family in Agreement with Plan yes     [id number] - [place] - [address] AT [facility] [address]  [address]  [place] [address]  Phone: [phone] Fax: [phone]
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
## 2019-09-14 PROBLEM — S62.357A CLOSED NONDISPLACED FRACTURE OF SHAFT OF FIFTH METACARPAL BONE OF LEFT HAND: Status: ACTIVE | Noted: 2019-09-14

## 2019-09-14 PROBLEM — N17.9 AKI (ACUTE KIDNEY INJURY): Status: RESOLVED | Noted: 2019-09-13 | Resolved: 2019-09-14

## 2019-09-14 PROBLEM — I16.1 HYPERTENSIVE EMERGENCY: Status: RESOLVED | Noted: 2019-09-13 | Resolved: 2019-09-14

## 2019-09-14 LAB
AMPHET+METHAMPHET UR QL: NEGATIVE
ANION GAP SERPL CALC-SCNC: 8 MMOL/L (ref 8–16)
BARBITURATES UR QL SCN>200 NG/ML: NEGATIVE
BENZODIAZ UR QL SCN>200 NG/ML: NEGATIVE
BUN SERPL-MCNC: 14 MG/DL (ref 6–20)
BZE UR QL SCN: NEGATIVE
CALCIUM SERPL-MCNC: 9 MG/DL (ref 8.7–10.5)
CANNABINOIDS UR QL SCN: NORMAL
CHLORIDE SERPL-SCNC: 102 MMOL/L (ref 95–110)
CO2 SERPL-SCNC: 28 MMOL/L (ref 23–29)
CREAT SERPL-MCNC: 1.4 MG/DL (ref 0.5–1.4)
CREAT UR-MCNC: 151.3 MG/DL (ref 15–325)
EST. GFR  (AFRICAN AMERICAN): 59 ML/MIN/1.73 M^2
EST. GFR  (NON AFRICAN AMERICAN): 51 ML/MIN/1.73 M^2
GLUCOSE SERPL-MCNC: 102 MG/DL (ref 70–110)
METHADONE UR QL SCN>300 NG/ML: NEGATIVE
OPIATES UR QL SCN: NEGATIVE
PCP UR QL SCN>25 NG/ML: NEGATIVE
POTASSIUM SERPL-SCNC: 3.2 MMOL/L (ref 3.5–5.1)
SODIUM SERPL-SCNC: 138 MMOL/L (ref 136–145)
TOXICOLOGY INFORMATION: NORMAL

## 2019-09-14 PROCEDURE — 80048 BASIC METABOLIC PNL TOTAL CA: CPT

## 2019-09-14 PROCEDURE — 25000003 PHARM REV CODE 250: Performed by: NURSE PRACTITIONER

## 2019-09-14 PROCEDURE — 25000003 PHARM REV CODE 250: Performed by: HOSPITALIST

## 2019-09-14 PROCEDURE — 11000001 HC ACUTE MED/SURG PRIVATE ROOM

## 2019-09-14 PROCEDURE — 36415 COLL VENOUS BLD VENIPUNCTURE: CPT

## 2019-09-14 PROCEDURE — 94761 N-INVAS EAR/PLS OXIMETRY MLT: CPT

## 2019-09-14 RX ORDER — POTASSIUM CHLORIDE 20 MEQ/1
40 TABLET, EXTENDED RELEASE ORAL ONCE
Status: COMPLETED | OUTPATIENT
Start: 2019-09-14 | End: 2019-09-14

## 2019-09-14 RX ORDER — IBUPROFEN 600 MG/1
600 TABLET ORAL EVERY 6 HOURS PRN
Status: DISCONTINUED | OUTPATIENT
Start: 2019-09-14 | End: 2019-09-16 | Stop reason: HOSPADM

## 2019-09-14 RX ORDER — LABETALOL 100 MG/1
200 TABLET, FILM COATED ORAL 2 TIMES DAILY
Status: DISCONTINUED | OUTPATIENT
Start: 2019-09-14 | End: 2019-09-16 | Stop reason: HOSPADM

## 2019-09-14 RX ADMIN — IBUPROFEN 600 MG: 600 TABLET ORAL at 08:09

## 2019-09-14 RX ADMIN — LOSARTAN POTASSIUM 25 MG: 25 TABLET, FILM COATED ORAL at 09:09

## 2019-09-14 RX ADMIN — IBUPROFEN 600 MG: 600 TABLET ORAL at 09:09

## 2019-09-14 RX ADMIN — POTASSIUM CHLORIDE 40 MEQ: 1500 TABLET, EXTENDED RELEASE ORAL at 04:09

## 2019-09-14 RX ADMIN — LABETALOL HYDROCHLORIDE 200 MG: 100 TABLET, FILM COATED ORAL at 09:09
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
## 2019-09-14 NOTE — PLAN OF CARE
Problem: Adult Inpatient Plan of Care  Goal: Plan of Care Review  Outcome: Ongoing (interventions implemented as appropriate)  Plan of care reviewed. Has Started Losartan this shift. Has been off of Cardene since last night. Transfer orders obtained for [facility]. To be transferred after shift change. HR has been in the 60's-70's and SR per cardiac monitor. Has had Ibuprofen x1 for left hand pain. No falls or injuries this shift.

## 2019-09-14 NOTE — ASSESSMENT & PLAN NOTE
Noted to be markedly hypertensive with evidence of end-organ damage, acute kidney injury, proteinurea.  Takes only labetalol at home.  Reports her blood pressures usually in the 160s.    -SBP still greater than 200 despite multiple antihypertensives  -p.r.n.  IV hydralazine 10 mg given x1, if ineffective will initiate nicardipine drip and transfer to ICU      Off cardene 9/13  Losartan   labetolol  Resume norvasc if HR tolerates

## 2019-09-14 NOTE — SUBJECTIVE & OBJECTIVE
Interval History: no new complaints   Review of Systems   Constitutional: Negative for chills, fatigue and fever.   Eyes: Negative for photophobia and visual disturbance.   Respiratory: Negative for cough and shortness of breath.    Cardiovascular: Negative for chest pain, palpitations and leg swelling.   Gastrointestinal: Negative for abdominal pain, diarrhea, nausea and vomiting.   Genitourinary: Negative for dysuria, frequency and urgency.   Musculoskeletal: Positive for arthralgias.   Skin: Negative for pallor, rash and wound.   Neurological: Positive for headaches. Negative for light-headedness.   Psychiatric/Behavioral: Negative for confusion and decreased concentration.     Objective:     Vital Signs (Most Recent):  Temp: 97.8 °F (36.6 °C) (09/14/19 1200)  Pulse: 64 (09/14/19 1600)  Resp: (!) 23 (09/14/19 1600)  BP: (!) 159/87 (09/14/19 1600)  SpO2: 99 % (09/14/19 1600) Vital Signs (24h Range):  Temp:  [97.7 °F (36.5 °C)-98.1 °F (36.7 °C)] 97.8 °F (36.6 °C)  Pulse:  [] 64  Resp:  [14-50] 23  SpO2:  [91 %-100 %] 99 %  BP: (125-221)/(8-119) 159/87     Weight: 121.9 kg (268 lb 11.9 oz)  Body mass index is 38.56 kg/m².    Intake/Output Summary (Last 24 hours) at 9/14/2019 1831  Last data filed at 9/14/2019 1200  Gross per 24 hour   Intake 728.17 ml   Output --   Net 728.17 ml      Physical Exam   Constitutional: She is oriented to person, place, and time. She appears well-developed and well-nourished. No distress.   HENT:   Head: Normocephalic and atraumatic.   Right Ear: External ear normal.   Left Ear: External ear normal.   Nose: Nose normal.   Mouth/Throat: Oropharynx is clear and moist.   Eyes: Pupils are equal, round, and reactive to light. Conjunctivae and EOM are normal.   Neck: Normal range of motion. Neck supple.   Cardiovascular: Normal rate, regular rhythm and intact distal pulses.   Pulmonary/Chest: Effort normal and breath sounds normal. No respiratory distress. She has no wheezes.    Abdominal: Soft. Bowel sounds are normal. She exhibits no distension. There is no tenderness.   No palpable hepatomegaly or splenomegaly    Musculoskeletal: Normal range of motion. She exhibits no edema or tenderness.   Left upper extremity splint in place, neurovascularly intact   Neurological: She is alert and oriented to person, place, and time.   Skin: Skin is warm and dry.   Psychiatric: She has a normal mood and affect. Thought content normal.   Nursing note and vitals reviewed.      Significant Labs: All pertinent labs within the past 24 hours have been reviewed.    Significant Imaging: I have reviewed and interpreted all pertinent imaging results/findings within the past 24 hours.

## 2019-09-14 NOTE — CARE UPDATE
[facility]  ICU Multidisciplinary Bedside Rounds   SUMMARY     Date: 9/14/2019    Prehospitalization: Home  Admit Date / LOS : 9/13/2019/ 1 days    Diagnosis: Hypertensive emergency    Consults:        Active: N/A       Needed: N/A     Code Status: Full Code   Advanced Directive: <no information>    LDA: PIV       Central Lines/Site/Justification:Patient Does Not Have Central Line       Urinary Cath/Order/Justification:Patient Does Not Have Urinary Catheter    Vasopressors/Infusions:    niCARdipine Stopped (09/13/19 2300)          GOALS: Volume/ Hemodynamic: MAP<135                     RASS: 0  alert and calm    CAM ICU: Negative  Pain Management: PO       Pain Controlled: yes     Rhythm: NSR    Respiratory Device: Room Air                   VTE Prophylaxis: Mechanical  Mobility: Ambulatory  Stress Ulcer Prophylaxis: No    Dietary: PO  Tolerance: yes  /  Advancement: n/a    Isolation: No active isolations    Restraints: No    Significant Dates:  Post Op Date: N/A  Rescue Date: N/A  Imaging/ Diagnostics: N/A    Noteworthy Labs:  none    CBC/Anemia Labs: Coags:    No results for input(s): WBC, HGB, HCT, PLT, MCV, RDW, IRON, FERRITIN, RETIC, FOLATE, SSDUWGYA50, OCCULTBLOOD in the last 168 hours.    Invalid input(s): IRONSATURATED No results for input(s): PT, INR, APTT in the last 168 hours.     Chemistries:   Recent Labs   Lab 09/13/19  0730 09/14/19  0339    138   K 3.3* 3.2*    102   CO2 26 28   BUN 18 14   CREATININE 1.6* 1.4   CALCIUM 9.3 9.0        Cardiac Enzymes: Ejection Fractions:    No results for input(s): CPK, CPKMB, MB, TROPONINI in the last 72 hours. No results found for: EF     POCT Glucose: HbA1c:    No results for input(s): POCTGLUCOSE in the last 168 hours. Hemoglobin A1C   Date Value Ref Range Status   05/06/2016 5.9 4.5 - 6.2 % Final        Needs from Care Team: None     ICU LOS 11h  Level of Care: OK to Transfer
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
## 2019-09-14 NOTE — PLAN OF CARE
Problem: Adult Inpatient Plan of Care  Goal: Plan of Care Review  Outcome: Ongoing (interventions implemented as appropriate)  Pt remains in ICU. Nicardipine gtt stopped at 2330 per order parameters. VSS, AAOx4. Pt reports being able to tolerate pain and verbalizes that she would rather not take pain medication but will ask for it when pain becomes intolerable. No pain medication given this shift. NSR on monitor, pt remains on RA. Pt got up to bathroom once this shift. Plan of care reviewed with pt at bedside. Pt remains free of falls, injuries and skin breakdown, precautions maintained for all.

## 2019-09-15 PROBLEM — N17.9 AKI (ACUTE KIDNEY INJURY): Status: ACTIVE | Noted: 2019-09-15

## 2019-09-15 LAB
ANION GAP SERPL CALC-SCNC: 8 MMOL/L (ref 8–16)
BASOPHILS # BLD AUTO: 0.04 K/UL (ref 0–0.2)
BASOPHILS NFR BLD: 0.6 % (ref 0–1.9)
BUN SERPL-MCNC: 24 MG/DL (ref 6–20)
CALCIUM SERPL-MCNC: 8.8 MG/DL (ref 8.7–10.5)
CHLORIDE SERPL-SCNC: 105 MMOL/L (ref 95–110)
CO2 SERPL-SCNC: 23 MMOL/L (ref 23–29)
CREAT SERPL-MCNC: 1.6 MG/DL (ref 0.5–1.4)
DIFFERENTIAL METHOD: ABNORMAL
EOSINOPHIL # BLD AUTO: 0.5 K/UL (ref 0–0.5)
EOSINOPHIL NFR BLD: 6.4 % (ref 0–8)
ERYTHROCYTE [DISTWIDTH] IN BLOOD BY AUTOMATED COUNT: 16.7 % (ref 11.5–14.5)
EST. GFR  (AFRICAN AMERICAN): 50 ML/MIN/1.73 M^2
EST. GFR  (NON AFRICAN AMERICAN): 43 ML/MIN/1.73 M^2
GLUCOSE SERPL-MCNC: 111 MG/DL (ref 70–110)
HCT VFR BLD AUTO: 35.2 % (ref 37–48.5)
HGB BLD-MCNC: 10.7 G/DL (ref 12–16)
LYMPHOCYTES # BLD AUTO: 2.2 K/UL (ref 1–4.8)
LYMPHOCYTES NFR BLD: 32 % (ref 18–48)
MCH RBC QN AUTO: 25.9 PG (ref 27–31)
MCHC RBC AUTO-ENTMCNC: 30.4 G/DL (ref 32–36)
MCV RBC AUTO: 85 FL (ref 82–98)
MONOCYTES # BLD AUTO: 0.5 K/UL (ref 0.3–1)
MONOCYTES NFR BLD: 7.1 % (ref 4–15)
NEUTROPHILS # BLD AUTO: 3.8 K/UL (ref 1.8–7.7)
NEUTROPHILS NFR BLD: 54 % (ref 38–73)
PLATELET # BLD AUTO: 212 K/UL (ref 150–350)
PLATELET BLD QL SMEAR: ABNORMAL
PMV BLD AUTO: 12.7 FL (ref 9.2–12.9)
POTASSIUM SERPL-SCNC: 4.1 MMOL/L (ref 3.5–5.1)
RBC # BLD AUTO: 4.13 M/UL (ref 4–5.4)
SODIUM SERPL-SCNC: 136 MMOL/L (ref 136–145)
WBC # BLD AUTO: 7 K/UL (ref 3.9–12.7)

## 2019-09-15 PROCEDURE — 11000001 HC ACUTE MED/SURG PRIVATE ROOM

## 2019-09-15 PROCEDURE — 36415 COLL VENOUS BLD VENIPUNCTURE: CPT

## 2019-09-15 PROCEDURE — 63600175 PHARM REV CODE 636 W HCPCS: Performed by: HOSPITALIST

## 2019-09-15 PROCEDURE — 85025 COMPLETE CBC W/AUTO DIFF WBC: CPT

## 2019-09-15 PROCEDURE — 25000003 PHARM REV CODE 250: Performed by: HOSPITALIST

## 2019-09-15 PROCEDURE — 80048 BASIC METABOLIC PNL TOTAL CA: CPT

## 2019-09-15 RX ORDER — ENOXAPARIN SODIUM 100 MG/ML
40 INJECTION SUBCUTANEOUS EVERY 24 HOURS
Status: DISCONTINUED | OUTPATIENT
Start: 2019-09-15 | End: 2019-09-16 | Stop reason: HOSPADM

## 2019-09-15 RX ORDER — HYDRALAZINE HYDROCHLORIDE 20 MG/ML
10 INJECTION INTRAMUSCULAR; INTRAVENOUS EVERY 4 HOURS PRN
Status: DISCONTINUED | OUTPATIENT
Start: 2019-09-15 | End: 2019-09-16 | Stop reason: HOSPADM

## 2019-09-15 RX ORDER — SODIUM CHLORIDE 9 MG/ML
INJECTION, SOLUTION INTRAVENOUS CONTINUOUS
Status: DISCONTINUED | OUTPATIENT
Start: 2019-09-15 | End: 2019-09-16

## 2019-09-15 RX ORDER — AMLODIPINE BESYLATE 5 MG/1
10 TABLET ORAL DAILY
Status: DISCONTINUED | OUTPATIENT
Start: 2019-09-15 | End: 2019-09-16 | Stop reason: HOSPADM

## 2019-09-15 RX ADMIN — SODIUM CHLORIDE: 0.9 INJECTION, SOLUTION INTRAVENOUS at 09:09

## 2019-09-15 RX ADMIN — IBUPROFEN 600 MG: 600 TABLET ORAL at 08:09

## 2019-09-15 RX ADMIN — LABETALOL HYDROCHLORIDE 200 MG: 100 TABLET, FILM COATED ORAL at 08:09

## 2019-09-15 RX ADMIN — AMLODIPINE BESYLATE 10 MG: 5 TABLET ORAL at 10:09

## 2019-09-15 RX ADMIN — LABETALOL HYDROCHLORIDE 200 MG: 100 TABLET, FILM COATED ORAL at 10:09

## 2019-09-15 RX ADMIN — IBUPROFEN 600 MG: 600 TABLET ORAL at 05:09

## 2019-09-15 RX ADMIN — IBUPROFEN 600 MG: 600 TABLET ORAL at 12:09

## 2019-09-15 RX ADMIN — ENOXAPARIN SODIUM 40 MG: 100 INJECTION SUBCUTANEOUS at 04:09

## 2019-09-15 NOTE — NURSING
Patient rested quietly this shift. No s/sx distress noted and no c/o voiced at this time. Call light in reach.
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
## 2019-09-15 NOTE — ASSESSMENT & PLAN NOTE
Noted to be markedly hypertensive with evidence of end-organ damage, acute kidney injury, proteinurea.  Takes only labetalol at home.  Reports her blood pressures usually in the 160s.    -SBP still greater than 200 despite multiple antihypertensives  -p.r.n.  IV hydralazine 10 mg given x1, if ineffective will initiate nicardipine drip and transfer to ICU      Off cardene 9/13  CRT did not improved yet,resumed IVF,changed ARB to Norvasc,

## 2019-09-15 NOTE — PLAN OF CARE
Problem: Adult Inpatient Plan of Care  Goal: Plan of Care Review  Outcome: Ongoing (interventions implemented as appropriate)     09/15/19 0454   Plan of Care Review   Plan of Care Reviewed With patient   Progress improving     Goal: Absence of Hospital-Acquired Illness or Injury  Outcome: Ongoing (interventions implemented as appropriate)  Intervention: Identify and Manage Fall Risk     09/15/19 0454   Optimize Balance and Safe Activity   Safety Promotion/Fall Prevention assistive device/personal item within reach;Fall Risk reviewed with patient/family;lighting adjusted;medications reviewed;nonskid shoes/socks when out of bed;room near unit station;side rails raised x 2;instructed to call staff for mobility     Intervention: Prevent VTE (venous thromboembolism)     09/15/19 0454   Prevent or Manage Embolism   VTE Prevention/Management remove, assess skin and reapply sequential compression de   09/15/19 0454   Prevent or Manage Embolism   VTE Prevention/Management remove, assess skin and reapply sequential compression device;ROM (active) performed   vice       Goal: Optimal Comfort and Wellbeing  Outcome: Ongoing (interventions implemented as appropriate)  Intervention: Provide Person-Centered Care     09/15/19 0454   Support Dyspnea Relief   Trust Relationship/Rapport care explained;choices provided;emotional support provided;empathic listening provided;questions answered;questions encouraged;reassurance provided;thoughts/feelings acknowledged         Problem: Fall Injury Risk  Goal: Absence of Fall and Fall-Related Injury  Outcome: Ongoing (interventions implemented as appropriate)  Intervention: Identify and Manage Contributors to Fall Injury Risk     09/15/19 0454   Manage Acute Allergic Reaction   Medication Review/Management medications reviewed   Identify and Manage Contributors to Fall Injury Risk   Self-Care Promotion independence encouraged;BADL personal objects within reach;BADL personal routines  maintained

## 2019-09-15 NOTE — NURSING
Patient received as transfer from ICU. Report received from LATOYA Avelar. Patient arrived to floor via wheelchair, accompanied by transport. Patient independent with ambulation and bathroom. Patient has dssg intact to LUE. No c/o voiced at present. Call light in reach.
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
## 2019-09-15 NOTE — ASSESSMENT & PLAN NOTE
Maintain splint, as needed analgesics,  Consulted ortho for evolution of left arm fracture and follow up.

## 2019-09-16 VITALS
HEIGHT: 70 IN | SYSTOLIC BLOOD PRESSURE: 170 MMHG | DIASTOLIC BLOOD PRESSURE: 93 MMHG | HEART RATE: 66 BPM | BODY MASS INDEX: 38.47 KG/M2 | WEIGHT: 268.75 LBS | TEMPERATURE: 98 F | OXYGEN SATURATION: 99 % | RESPIRATION RATE: 17 BRPM

## 2019-09-16 LAB
ANION GAP SERPL CALC-SCNC: 7 MMOL/L (ref 8–16)
BASOPHILS # BLD AUTO: 0.04 K/UL (ref 0–0.2)
BASOPHILS NFR BLD: 0.6 % (ref 0–1.9)
BUN SERPL-MCNC: 18 MG/DL (ref 6–20)
CALCIUM SERPL-MCNC: 8.5 MG/DL (ref 8.7–10.5)
CHLORIDE SERPL-SCNC: 106 MMOL/L (ref 95–110)
CO2 SERPL-SCNC: 24 MMOL/L (ref 23–29)
CREAT SERPL-MCNC: 1.3 MG/DL (ref 0.5–1.4)
DIFFERENTIAL METHOD: ABNORMAL
EOSINOPHIL # BLD AUTO: 0.5 K/UL (ref 0–0.5)
EOSINOPHIL NFR BLD: 7 % (ref 0–8)
ERYTHROCYTE [DISTWIDTH] IN BLOOD BY AUTOMATED COUNT: 16.3 % (ref 11.5–14.5)
EST. GFR  (AFRICAN AMERICAN): >60 ML/MIN/1.73 M^2
EST. GFR  (NON AFRICAN AMERICAN): 56 ML/MIN/1.73 M^2
GLUCOSE SERPL-MCNC: 100 MG/DL (ref 70–110)
HCT VFR BLD AUTO: 34.8 % (ref 37–48.5)
HGB BLD-MCNC: 10.2 G/DL (ref 12–16)
LYMPHOCYTES # BLD AUTO: 2.3 K/UL (ref 1–4.8)
LYMPHOCYTES NFR BLD: 32.2 % (ref 18–48)
MCH RBC QN AUTO: 24.4 PG (ref 27–31)
MCHC RBC AUTO-ENTMCNC: 29.3 G/DL (ref 32–36)
MCV RBC AUTO: 83 FL (ref 82–98)
MONOCYTES # BLD AUTO: 0.6 K/UL (ref 0.3–1)
MONOCYTES NFR BLD: 8.6 % (ref 4–15)
NEUTROPHILS # BLD AUTO: 3.6 K/UL (ref 1.8–7.7)
NEUTROPHILS NFR BLD: 51.6 % (ref 38–73)
PLATELET # BLD AUTO: 258 K/UL (ref 150–350)
PMV BLD AUTO: 11.7 FL (ref 9.2–12.9)
POTASSIUM SERPL-SCNC: 4.4 MMOL/L (ref 3.5–5.1)
RBC # BLD AUTO: 4.18 M/UL (ref 4–5.4)
SODIUM SERPL-SCNC: 137 MMOL/L (ref 136–145)
WBC # BLD AUTO: 7.01 K/UL (ref 3.9–12.7)

## 2019-09-16 PROCEDURE — 25000003 PHARM REV CODE 250: Performed by: HOSPITALIST

## 2019-09-16 PROCEDURE — 80048 BASIC METABOLIC PNL TOTAL CA: CPT

## 2019-09-16 PROCEDURE — 85025 COMPLETE CBC W/AUTO DIFF WBC: CPT

## 2019-09-16 PROCEDURE — 36415 COLL VENOUS BLD VENIPUNCTURE: CPT

## 2019-09-16 PROCEDURE — 63600175 PHARM REV CODE 636 W HCPCS: Performed by: HOSPITALIST

## 2019-09-16 RX ORDER — LABETALOL 200 MG/1
400 TABLET, FILM COATED ORAL 2 TIMES DAILY
Qty: 120 TABLET | Refills: 0 | Status: SHIPPED | OUTPATIENT
Start: 2019-09-16 | End: 2023-05-02

## 2019-09-16 RX ORDER — HYDRALAZINE HYDROCHLORIDE 25 MG/1
25 TABLET, FILM COATED ORAL EVERY 8 HOURS
Qty: 90 TABLET | Refills: 0 | Status: SHIPPED | OUTPATIENT
Start: 2019-09-16 | End: 2022-09-07 | Stop reason: SDUPTHER

## 2019-09-16 RX ORDER — AMLODIPINE BESYLATE 10 MG/1
10 TABLET ORAL DAILY
Qty: 30 TABLET | Refills: 0 | Status: SHIPPED | OUTPATIENT
Start: 2019-09-17 | End: 2021-11-10

## 2019-09-16 RX ORDER — HYDROCODONE BITARTRATE AND ACETAMINOPHEN 5; 325 MG/1; MG/1
1 TABLET ORAL EVERY 4 HOURS PRN
Qty: 20 TABLET | Refills: 0 | Status: SHIPPED | OUTPATIENT
Start: 2019-09-16 | End: 2019-09-23

## 2019-09-16 RX ORDER — HYDRALAZINE HYDROCHLORIDE 25 MG/1
25 TABLET, FILM COATED ORAL EVERY 8 HOURS
Status: DISCONTINUED | OUTPATIENT
Start: 2019-09-16 | End: 2019-09-16 | Stop reason: HOSPADM

## 2019-09-16 RX ADMIN — HYDRALAZINE HYDROCHLORIDE 10 MG: 20 INJECTION INTRAMUSCULAR; INTRAVENOUS at 06:09

## 2019-09-16 RX ADMIN — LABETALOL HYDROCHLORIDE 200 MG: 100 TABLET, FILM COATED ORAL at 08:09

## 2019-09-16 RX ADMIN — ACETAMINOPHEN 650 MG: 325 TABLET, FILM COATED ORAL at 10:09

## 2019-09-16 RX ADMIN — AMLODIPINE BESYLATE 10 MG: 5 TABLET ORAL at 08:09

## 2019-09-16 RX ADMIN — IBUPROFEN 600 MG: 600 TABLET ORAL at 06:09

## 2019-09-16 RX ADMIN — HYDRALAZINE HYDROCHLORIDE 25 MG: 25 TABLET ORAL at 08:09

## 2019-09-16 RX ADMIN — SODIUM CHLORIDE: 0.9 INJECTION, SOLUTION INTRAVENOUS at 02:09

## 2019-09-16 NOTE — CONSULTS
TN sent patient's clinicals (Facesheet, HP, Labs, MAR, MD note, Ortho note, and Ortho referral) to [facility] for Ortho appt at [phone].

## 2019-09-16 NOTE — CONSULTS
TN will send a referral to [facility] for an Ortho appt. Dr. Mathew does not accept patient's insurance.

## 2019-09-16 NOTE — PLAN OF CARE
Problem: Adult Inpatient Plan of Care  Goal: Plan of Care Review  Outcome: Ongoing (interventions implemented as appropriate)     09/16/19 0407   Plan of Care Review   Plan of Care Reviewed With patient   Progress no change     Goal: Optimal Comfort and Wellbeing  Outcome: Ongoing (interventions implemented as appropriate)  Intervention: Provide Person-Centered Care     09/16/19 0407   Support Dyspnea Relief   Trust Relationship/Rapport care explained;choices provided;emotional support provided;empathic listening provided;questions answered;questions encouraged;reassurance provided;thoughts/feelings acknowledged         Problem: Fall Injury Risk  Goal: Absence of Fall and Fall-Related Injury  Outcome: Ongoing (interventions implemented as appropriate)  Intervention: Identify and Manage Contributors to Fall Injury Risk     09/16/19 0407   Manage Acute Allergic Reaction   Medication Review/Management medications reviewed   Identify and Manage Contributors to Fall Injury Risk   Self-Care Promotion independence encouraged;BADL personal objects within reach;BADL personal routines maintained     Intervention: Promote Injury-Free Environment     09/16/19 0407   Optimize Balance and Safe Activity   Safety Promotion/Fall Prevention assistive device/personal item within reach;Fall Risk reviewed with patient/family;lighting adjusted;medications reviewed;nonskid shoes/socks when out of bed;room near unit station;side rails raised x 2   Optimize [place] and Functional Mobility   Environmental Safety Modification assistive device/personal items within reach;clutter free environment maintained;lighting adjusted;room near unit station;room organization consistent

## 2019-09-16 NOTE — PLAN OF CARE
TN reviewed follow up appointment information as well as discharge instructions with patient using teach back. Patient stated she will notify the doctor if she has redness and warmth to her arms and trouble breathing. Patient is in agreement and verbalized an understanding. Placed discharge information in blue discharge folder. TN also reviewed patient responsibility checklist with her using teach back. Patient was able to verbalize her responsibilities after discharge to manage her care at home being   1. Going to follow up appointments   2. Picking up rx from the pharmacy when discharged  3. Taking her medications as prescribed     Patient's nurse,Dinora, informed that patient can discharge from  standpoint.     Patient will speak with her PCP (Dr. Crowe) to obtain and Ortho referral.        09/16/19 1316   Final Note   Assessment Type Final Discharge Note   Anticipated Discharge Disposition Home   What phone number can be called within the next 1-3 days to see how you are doing after discharge?   ([phone])   Hospital Follow Up  Appt(s) scheduled? Yes   Discharge plans and expectations educations in teach back method with documentation complete? Yes   Right [facility] Referral Info   Post Acute Recommendation No Care

## 2019-09-16 NOTE — PROGRESS NOTES
[facility] CASE MANAGEMENT                  WRITTEN DISCHARGE INFORMATION      APPOINTMENTS AND RESOURCES TO HELP YOU MANAGE YOUR CARE AT HOME BASED ON YOUR PREFERENCES:  (If an appointment is not scheduled for you when you leave the hospital, call your doctor to schedule a follow up visit within a week)    Follow-up Information     Josefina Crowe MD On 9/20/2019.    Specialty:  Family Medicine  Why:  Appointment scheduled for Friday September 20th at 10am  Contact information:  [address] [place] [address]  [phone]             [facility] In 1 week.    Why:  Please call to schedule your PCP appt in 1 week   Contact information:  [address] [address] [place] [address]  [phone]             [facility] APPOINTMENT LINE ALL SPECIALTIES.    Why:  A  will call you to arrange your ORTHO appt. Please call if you do not receive a call in 1 week   Contact information:  [address] [facility] [address]  [phone]                 Healthy Living Instructions to HELP MANAGE YOUR CARE AT HOME:  Things You are responsible for:  1.    Getting your prescriptions filled   2.    Taking your medications as directed, DO NOT MISS ANY DOSES!  3.    Following the diet and exercise recommended by your doctor  4.    Going to your follow-up doctor appointment. This is important because it allows the doctor to monitor your progress and determine if any changes need to made to your treatment plan.  5. If you have any questions about MANAGING YOUR CARE AT HOME Call the Nurse Care Line for 24/7 Assistance [phone]       Please answer any calls you may receive from [facility]. We want to continue to support you as you manage your healthcare needs. [facility] is happy to have the opportunity to serve you.      Thank you for choosing [facility] for your healthcare needs!  Your [facility] Case Management Team,
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
## 2019-09-18 ENCOUNTER — PATIENT OUTREACH (OUTPATIENT)
Dept: ADMINISTRATIVE | Facility: CLINIC | Age: 29
End: 2019-09-18

## 2019-09-18 NOTE — PATIENT INSTRUCTIONS
Discharge Instructions for High Blood Pressure (Hypertension)    You have been diagnosed with high blood pressure (also called hypertension). This means the force of blood against your artery walls is too strong. It also means your heart is working hard to move blood. High blood pressure usually has no symptoms, but over time, it can damage your heart, blood vessels, eyes, kidneys, and other organs. With help from your doctor, you can manage your blood pressure and protect your health.    Taking Medications    Learn to take your own blood pressure. Keep a record of your results. Ask your doctor which readings mean that you need medical attention.  Take your blood pressure medication exactly as directed. Dont skip doses. Missing doses can cause your blood pressure to get out of control.  Avoid medications that contain heart stimulants, including over-the-counter drugs. Check for warnings about high blood pressure on the label.  Check with your doctor before taking a decongestant. Some decongestants can worsen high blood pressure.    Lifestyle Changes    Maintain a healthy weight. Get help to lose any extra pounds.  Cut back on salt.  Limit canned, dried, packaged, and fast foods.  Dont add salt to your food at the table.  Season foods with herbs instead of salt when you cook.  Follow the DASH (Dietary Approaches to Stop Hypertension) eating plan. This plan recommends vegetables, fruits, whole gains, and other heart healthy foods.  Begin an exercise program. Ask your doctor how to get started. You can benefit from simple activities like walking or gardening.  Break the smoking habit. Enroll in a stop-smoking program to improve your chances of success. Ask your health care provider about programs and medications to help you stop smoking.  Limit drinks that contain caffeine (coffee, black or green tea, cola) to 2 per day.  Never take stimulants such as amphetamines or cocaine; these drugs can be deadly for someone  with high blood pressure.  Control your stress. Learn stress-management techniques.  Limit alcohol to no more than 1 drink a day for women and 2 drinks a day for men.    Follow-Up  Make a follow-up appointment as directed by our staff.    When to Call Your Doctor  Call your doctor immediately if you have any of the following:  Chest pain or shortness of breath (call 911)  Moderate to severe headache  Weakness in the muscles of your face, arms, or legs  Trouble speaking  Extreme drowsiness  Confusion  Fainting or dizziness  Pulsating or rushing sound in your ears  Unexplained nosebleed  Weakness, tingling, or numbness of your face, arms, or legs  Change in vision  Blood pressure measured at home that is greater than 180/110    © 8282-6515 [facility], [address] [place] [address], [place] [address]. All rights reserved. This information is not intended as a substitute for professional medical care. Always follow your healthcare professional's instructions.

## 2021-01-17 ENCOUNTER — CLINICAL SUPPORT (OUTPATIENT)
Dept: URGENT CARE | Facility: CLINIC | Age: 31
End: 2021-01-17
Payer: MEDICAID

## 2021-01-17 DIAGNOSIS — Z11.9 ENCOUNTER FOR SCREENING EXAMINATION FOR INFECTIOUS DISEASE: Primary | ICD-10-CM

## 2021-01-17 LAB
CTP QC/QA: YES
SARS-COV-2 RDRP RESP QL NAA+PROBE: NEGATIVE

## 2021-01-17 PROCEDURE — U0002 COVID-19 LAB TEST NON-CDC: HCPCS | Mod: QW,S$GLB,, | Performed by: PHYSICIAN ASSISTANT

## 2021-01-17 PROCEDURE — U0002: ICD-10-PCS | Mod: QW,S$GLB,, | Performed by: PHYSICIAN ASSISTANT

## 2021-06-30 ENCOUNTER — LAB VISIT (OUTPATIENT)
Dept: LAB | Facility: HOSPITAL | Age: 31
End: 2021-06-30
Attending: INTERNAL MEDICINE
Payer: MEDICAID

## 2021-06-30 DIAGNOSIS — D64.9 ANEMIA, UNSPECIFIED TYPE: ICD-10-CM

## 2021-06-30 DIAGNOSIS — N17.9 ACUTE RENAL FAILURE, UNSPECIFIED ACUTE RENAL FAILURE TYPE: ICD-10-CM

## 2021-06-30 DIAGNOSIS — E55.9 VITAMIN D DEFICIENCY: ICD-10-CM

## 2021-06-30 LAB
25(OH)D3+25(OH)D2 SERPL-MCNC: 17 NG/ML (ref 30–96)
ALBUMIN SERPL BCP-MCNC: 3.8 G/DL (ref 3.5–5.2)
ALP SERPL-CCNC: 88 U/L (ref 55–135)
ALT SERPL W/O P-5'-P-CCNC: 7 U/L (ref 10–44)
ANION GAP SERPL CALC-SCNC: 7 MMOL/L (ref 8–16)
AST SERPL-CCNC: 10 U/L (ref 10–40)
BACTERIA #/AREA URNS HPF: ABNORMAL /HPF
BASOPHILS # BLD AUTO: 0.04 K/UL (ref 0–0.2)
BASOPHILS NFR BLD: 0.4 % (ref 0–1.9)
BILIRUB SERPL-MCNC: 0.3 MG/DL (ref 0.1–1)
BILIRUB UR QL STRIP: NEGATIVE
BUN SERPL-MCNC: 27 MG/DL (ref 6–20)
CALCIUM SERPL-MCNC: 9.3 MG/DL (ref 8.7–10.5)
CHLORIDE SERPL-SCNC: 109 MMOL/L (ref 95–110)
CLARITY UR: ABNORMAL
CO2 SERPL-SCNC: 21 MMOL/L (ref 23–29)
COLOR UR: YELLOW
CREAT SERPL-MCNC: 2.9 MG/DL (ref 0.5–1.4)
CREAT UR-MCNC: >450 MG/DL (ref 15–325)
DIFFERENTIAL METHOD: ABNORMAL
EOSINOPHIL # BLD AUTO: 0.4 K/UL (ref 0–0.5)
EOSINOPHIL NFR BLD: 4 % (ref 0–8)
ERYTHROCYTE [DISTWIDTH] IN BLOOD BY AUTOMATED COUNT: 16.8 % (ref 11.5–14.5)
EST. GFR  (AFRICAN AMERICAN): 24 ML/MIN/1.73 M^2
EST. GFR  (NON AFRICAN AMERICAN): 21 ML/MIN/1.73 M^2
FERRITIN SERPL-MCNC: 6 NG/ML (ref 20–300)
GLUCOSE SERPL-MCNC: 118 MG/DL (ref 70–110)
GLUCOSE UR QL STRIP: NEGATIVE
HCT VFR BLD AUTO: 34.9 % (ref 37–48.5)
HGB BLD-MCNC: 10.5 G/DL (ref 12–16)
HGB UR QL STRIP: NEGATIVE
HYALINE CASTS #/AREA URNS LPF: 7 /LPF
IMM GRANULOCYTES # BLD AUTO: 0.02 K/UL (ref 0–0.04)
IMM GRANULOCYTES NFR BLD AUTO: 0.2 % (ref 0–0.5)
IRON SERPL-MCNC: 24 UG/DL (ref 30–160)
KETONES UR QL STRIP: ABNORMAL
LEUKOCYTE ESTERASE UR QL STRIP: NEGATIVE
LYMPHOCYTES # BLD AUTO: 1.9 K/UL (ref 1–4.8)
LYMPHOCYTES NFR BLD: 19.3 % (ref 18–48)
MAGNESIUM SERPL-MCNC: 2 MG/DL (ref 1.6–2.6)
MCH RBC QN AUTO: 23.3 PG (ref 27–31)
MCHC RBC AUTO-ENTMCNC: 30.1 G/DL (ref 32–36)
MCV RBC AUTO: 77 FL (ref 82–98)
MICROSCOPIC COMMENT: ABNORMAL
MONOCYTES # BLD AUTO: 0.7 K/UL (ref 0.3–1)
MONOCYTES NFR BLD: 7.3 % (ref 4–15)
NEUTROPHILS # BLD AUTO: 6.8 K/UL (ref 1.8–7.7)
NEUTROPHILS NFR BLD: 68.8 % (ref 38–73)
NITRITE UR QL STRIP: NEGATIVE
NON-SQ EPI CELLS #/AREA URNS HPF: 0 /HPF
NRBC BLD-RTO: 0 /100 WBC
PH UR STRIP: 6 [PH] (ref 5–8)
PHOSPHATE SERPL-MCNC: 2.8 MG/DL (ref 2.7–4.5)
PLATELET # BLD AUTO: 417 K/UL (ref 150–450)
PMV BLD AUTO: 10.9 FL (ref 9.2–12.9)
POTASSIUM SERPL-SCNC: 4.5 MMOL/L (ref 3.5–5.1)
PROT SERPL-MCNC: 8.2 G/DL (ref 6–8.4)
PROT UR QL STRIP: ABNORMAL
PROT UR-MCNC: 103 MG/DL
PROT/CREAT UR: ABNORMAL MG/G{CREAT} (ref 0–0.2)
PTH-INTACT SERPL-MCNC: 403.3 PG/ML (ref 9–77)
RBC # BLD AUTO: 4.51 M/UL (ref 4–5.4)
RBC #/AREA URNS HPF: 4 /HPF (ref 0–4)
SATURATED IRON: 6 % (ref 20–50)
SODIUM SERPL-SCNC: 137 MMOL/L (ref 136–145)
SP GR UR STRIP: 1.02 (ref 1–1.03)
SQUAMOUS #/AREA URNS HPF: 5 /HPF
TOTAL IRON BINDING CAPACITY: 386 UG/DL (ref 250–450)
TRANSFERRIN SERPL-MCNC: 261 MG/DL (ref 200–375)
URATE SERPL-MCNC: 9.1 MG/DL (ref 2.4–5.7)
URN SPEC COLLECT METH UR: ABNORMAL
UROBILINOGEN UR STRIP-ACNC: NEGATIVE EU/DL
WBC # BLD AUTO: 9.88 K/UL (ref 3.9–12.7)
WBC #/AREA URNS HPF: 4 /HPF (ref 0–5)

## 2021-06-30 PROCEDURE — 80053 COMPREHEN METABOLIC PANEL: CPT | Performed by: INTERNAL MEDICINE

## 2021-06-30 PROCEDURE — 81000 URINALYSIS NONAUTO W/SCOPE: CPT | Performed by: INTERNAL MEDICINE

## 2021-06-30 PROCEDURE — 36415 COLL VENOUS BLD VENIPUNCTURE: CPT | Performed by: INTERNAL MEDICINE

## 2021-06-30 PROCEDURE — 83735 ASSAY OF MAGNESIUM: CPT | Performed by: INTERNAL MEDICINE

## 2021-06-30 PROCEDURE — 82728 ASSAY OF FERRITIN: CPT | Performed by: INTERNAL MEDICINE

## 2021-06-30 PROCEDURE — 84550 ASSAY OF BLOOD/URIC ACID: CPT | Performed by: INTERNAL MEDICINE

## 2021-06-30 PROCEDURE — 82306 VITAMIN D 25 HYDROXY: CPT | Performed by: INTERNAL MEDICINE

## 2021-06-30 PROCEDURE — 83540 ASSAY OF IRON: CPT | Performed by: INTERNAL MEDICINE

## 2021-06-30 PROCEDURE — 83970 ASSAY OF PARATHORMONE: CPT | Performed by: INTERNAL MEDICINE

## 2021-06-30 PROCEDURE — 82570 ASSAY OF URINE CREATININE: CPT | Performed by: INTERNAL MEDICINE

## 2021-06-30 PROCEDURE — 85025 COMPLETE CBC W/AUTO DIFF WBC: CPT | Performed by: INTERNAL MEDICINE

## 2021-06-30 PROCEDURE — 84100 ASSAY OF PHOSPHORUS: CPT | Performed by: INTERNAL MEDICINE

## 2021-08-09 RX ORDER — HEPARIN 100 UNIT/ML
500 SYRINGE INTRAVENOUS
Status: CANCELLED | OUTPATIENT
Start: 2021-08-13

## 2021-08-09 RX ORDER — SODIUM CHLORIDE 0.9 % (FLUSH) 0.9 %
10 SYRINGE (ML) INJECTION
Status: CANCELLED | OUTPATIENT
Start: 2021-08-13

## 2021-09-14 ENCOUNTER — CLINICAL SUPPORT (OUTPATIENT)
Dept: URGENT CARE | Facility: CLINIC | Age: 31
End: 2021-09-14
Payer: MEDICAID

## 2021-09-14 DIAGNOSIS — Z20.822 ENCOUNTER FOR LABORATORY TESTING FOR COVID-19 VIRUS: Primary | ICD-10-CM

## 2021-09-14 LAB
CTP QC/QA: YES
SARS-COV-2 RDRP RESP QL NAA+PROBE: NEGATIVE

## 2021-09-14 PROCEDURE — 87635 SARS-COV-2 COVID-19 AMP PRB: CPT | Mod: QW,S$GLB,, | Performed by: PHYSICIAN ASSISTANT

## 2021-09-14 PROCEDURE — 87635: ICD-10-PCS | Mod: QW,S$GLB,, | Performed by: PHYSICIAN ASSISTANT

## 2021-09-22 NOTE — SUBJECTIVE & OBJECTIVE
Interval History: no new complaints   Review of Systems   Constitutional: Negative for chills, fatigue and fever.   Eyes: Negative for photophobia and visual disturbance.   Respiratory: Negative for cough and shortness of breath.    Cardiovascular: Negative for chest pain, palpitations and leg swelling.   Gastrointestinal: Negative for abdominal pain, diarrhea, nausea and vomiting.   Genitourinary: Negative for dysuria, frequency and urgency.   Musculoskeletal: Positive for arthralgias.   Skin: Negative for pallor, rash and wound.   Neurological: Positive for headaches. Negative for light-headedness.   Psychiatric/Behavioral: Negative for confusion and decreased concentration.     Objective:     Vital Signs (Most Recent):  Temp: 97.5 °F (36.4 °C) (09/15/19 0800)  Pulse: 60 (09/15/19 0800)  Resp: 18 (09/15/19 0800)  BP: (!) 157/92 (09/15/19 0800)  SpO2: 100 % (09/15/19 0800) Vital Signs (24h Range):  Temp:  [97.5 °F (36.4 °C)-98.5 °F (36.9 °C)] 97.5 °F (36.4 °C)  Pulse:  [56-77] 60  Resp:  [15-41] 18  SpO2:  [93 %-100 %] 100 %  BP: (123-164)/() 157/92     Weight: 121.9 kg (268 lb 11.9 oz)  Body mass index is 38.56 kg/m².    Intake/Output Summary (Last 24 hours) at 9/15/2019 1014  Last data filed at 9/15/2019 0900  Gross per 24 hour   Intake 360 ml   Output --   Net 360 ml      Physical Exam   Constitutional: She is oriented to person, place, and time. She appears well-developed and well-nourished. No distress.   HENT:   Head: Normocephalic and atraumatic.   Right Ear: External ear normal.   Left Ear: External ear normal.   Nose: Nose normal.   Mouth/Throat: Oropharynx is clear and moist.   Eyes: Pupils are equal, round, and reactive to light. Conjunctivae and EOM are normal.   Neck: Normal range of motion. Neck supple.   Cardiovascular: Normal rate, regular rhythm and intact distal pulses.   Pulmonary/Chest: Effort normal and breath sounds normal. No respiratory distress. She has no wheezes.   Abdominal: Soft.  Bowel sounds are normal. She exhibits no distension. There is no tenderness.   No palpable hepatomegaly or splenomegaly    Musculoskeletal: Normal range of motion. She exhibits no edema or tenderness.   Left upper extremity splint in place, neurovascularly intact   Neurological: She is alert and oriented to person, place, and time.   Skin: Skin is warm and dry.   Psychiatric: She has a normal mood and affect. Thought content normal.   Nursing note and vitals reviewed.      Significant Labs: All pertinent labs within the past 24 hours have been reviewed.    Significant Imaging: I have reviewed and interpreted all pertinent imaging results/findings within the past 24 hours.   Karolina Mendez

## 2021-09-24 ENCOUNTER — CLINICAL SUPPORT (OUTPATIENT)
Dept: URGENT CARE | Facility: CLINIC | Age: 31
End: 2021-09-24
Payer: MEDICAID

## 2021-09-24 DIAGNOSIS — Z20.822 ENCOUNTER FOR LABORATORY TESTING FOR COVID-19 VIRUS: Primary | ICD-10-CM

## 2021-09-24 LAB
CTP QC/QA: YES
SARS-COV-2 RDRP RESP QL NAA+PROBE: NEGATIVE

## 2021-09-24 PROCEDURE — 87635 SARS-COV-2 COVID-19 AMP PRB: CPT | Mod: QW,S$GLB,, | Performed by: PHYSICIAN ASSISTANT

## 2021-09-24 PROCEDURE — 87635: ICD-10-PCS | Mod: QW,S$GLB,, | Performed by: PHYSICIAN ASSISTANT

## 2021-09-30 ENCOUNTER — INFUSION (OUTPATIENT)
Dept: INFUSION THERAPY | Facility: HOSPITAL | Age: 31
End: 2021-09-30
Attending: INTERNAL MEDICINE
Payer: MEDICAID

## 2021-09-30 VITALS
OXYGEN SATURATION: 98 % | TEMPERATURE: 99 F | RESPIRATION RATE: 16 BRPM | DIASTOLIC BLOOD PRESSURE: 70 MMHG | SYSTOLIC BLOOD PRESSURE: 124 MMHG | HEART RATE: 66 BPM

## 2021-09-30 DIAGNOSIS — N92.0 HEAVY PERIODS: Primary | ICD-10-CM

## 2021-09-30 PROCEDURE — 25000003 PHARM REV CODE 250: Performed by: INTERNAL MEDICINE

## 2021-09-30 PROCEDURE — 96365 THER/PROPH/DIAG IV INF INIT: CPT

## 2021-09-30 PROCEDURE — 63600175 PHARM REV CODE 636 W HCPCS: Performed by: INTERNAL MEDICINE

## 2021-09-30 RX ORDER — SODIUM CHLORIDE 0.9 % (FLUSH) 0.9 %
10 SYRINGE (ML) INJECTION
Status: CANCELLED | OUTPATIENT
Start: 2021-10-07

## 2021-09-30 RX ORDER — HEPARIN 100 UNIT/ML
500 SYRINGE INTRAVENOUS
Status: CANCELLED | OUTPATIENT
Start: 2021-10-07

## 2021-09-30 RX ADMIN — IRON SUCROSE 200 MG: 20 INJECTION, SOLUTION INTRAVENOUS at 11:09

## 2021-10-06 ENCOUNTER — INFUSION (OUTPATIENT)
Dept: INFUSION THERAPY | Facility: HOSPITAL | Age: 31
End: 2021-10-06
Attending: INTERNAL MEDICINE
Payer: MEDICAID

## 2021-10-06 VITALS
HEART RATE: 65 BPM | TEMPERATURE: 99 F | OXYGEN SATURATION: 99 % | SYSTOLIC BLOOD PRESSURE: 131 MMHG | RESPIRATION RATE: 16 BRPM | DIASTOLIC BLOOD PRESSURE: 83 MMHG

## 2021-10-06 DIAGNOSIS — N92.0 HEAVY PERIODS: Primary | ICD-10-CM

## 2021-10-06 PROCEDURE — 63600175 PHARM REV CODE 636 W HCPCS: Performed by: INTERNAL MEDICINE

## 2021-10-06 PROCEDURE — 96367 TX/PROPH/DG ADDL SEQ IV INF: CPT

## 2021-10-06 PROCEDURE — 96365 THER/PROPH/DIAG IV INF INIT: CPT

## 2021-10-06 PROCEDURE — 25000003 PHARM REV CODE 250: Performed by: INTERNAL MEDICINE

## 2021-10-06 RX ORDER — SODIUM CHLORIDE 0.9 % (FLUSH) 0.9 %
10 SYRINGE (ML) INJECTION
Status: CANCELLED | OUTPATIENT
Start: 2021-10-13

## 2021-10-06 RX ORDER — HEPARIN 100 UNIT/ML
500 SYRINGE INTRAVENOUS
Status: CANCELLED | OUTPATIENT
Start: 2021-10-13

## 2021-10-06 RX ADMIN — IRON SUCROSE 200 MG: 20 INJECTION, SOLUTION INTRAVENOUS at 12:10

## 2021-10-14 ENCOUNTER — INFUSION (OUTPATIENT)
Dept: INFUSION THERAPY | Facility: HOSPITAL | Age: 31
End: 2021-10-14
Attending: INTERNAL MEDICINE
Payer: MEDICAID

## 2021-10-14 VITALS
SYSTOLIC BLOOD PRESSURE: 121 MMHG | DIASTOLIC BLOOD PRESSURE: 73 MMHG | OXYGEN SATURATION: 100 % | TEMPERATURE: 98 F | RESPIRATION RATE: 16 BRPM | HEART RATE: 67 BPM

## 2021-10-14 DIAGNOSIS — N92.0 HEAVY PERIODS: Primary | ICD-10-CM

## 2021-10-14 PROCEDURE — 63600175 PHARM REV CODE 636 W HCPCS: Performed by: INTERNAL MEDICINE

## 2021-10-14 PROCEDURE — 25000003 PHARM REV CODE 250: Performed by: INTERNAL MEDICINE

## 2021-10-14 PROCEDURE — 96365 THER/PROPH/DIAG IV INF INIT: CPT

## 2021-10-14 RX ADMIN — IRON SUCROSE 200 MG: 20 INJECTION, SOLUTION INTRAVENOUS at 10:10

## 2021-10-21 ENCOUNTER — INFUSION (OUTPATIENT)
Dept: INFUSION THERAPY | Facility: HOSPITAL | Age: 31
End: 2021-10-21
Attending: INTERNAL MEDICINE
Payer: MEDICAID

## 2021-10-21 VITALS
SYSTOLIC BLOOD PRESSURE: 140 MMHG | DIASTOLIC BLOOD PRESSURE: 97 MMHG | HEART RATE: 75 BPM | OXYGEN SATURATION: 100 % | RESPIRATION RATE: 16 BRPM | TEMPERATURE: 99 F

## 2021-10-21 DIAGNOSIS — N92.0 HEAVY PERIODS: Primary | ICD-10-CM

## 2021-10-21 PROCEDURE — 63600175 PHARM REV CODE 636 W HCPCS: Performed by: INTERNAL MEDICINE

## 2021-10-21 PROCEDURE — 25000003 PHARM REV CODE 250: Performed by: INTERNAL MEDICINE

## 2021-10-21 PROCEDURE — 96365 THER/PROPH/DIAG IV INF INIT: CPT

## 2021-10-21 RX ADMIN — IRON SUCROSE 200 MG: 20 INJECTION, SOLUTION INTRAVENOUS at 11:10

## 2021-10-28 ENCOUNTER — INFUSION (OUTPATIENT)
Dept: INFUSION THERAPY | Facility: HOSPITAL | Age: 31
End: 2021-10-28
Attending: INTERNAL MEDICINE
Payer: MEDICAID

## 2021-10-28 VITALS
DIASTOLIC BLOOD PRESSURE: 99 MMHG | RESPIRATION RATE: 16 BRPM | HEART RATE: 67 BPM | OXYGEN SATURATION: 98 % | SYSTOLIC BLOOD PRESSURE: 146 MMHG

## 2021-10-28 DIAGNOSIS — N92.0 HEAVY PERIODS: Primary | ICD-10-CM

## 2021-10-28 PROCEDURE — 25000003 PHARM REV CODE 250: Performed by: INTERNAL MEDICINE

## 2021-10-28 PROCEDURE — 63600175 PHARM REV CODE 636 W HCPCS: Performed by: INTERNAL MEDICINE

## 2021-10-28 PROCEDURE — 96365 THER/PROPH/DIAG IV INF INIT: CPT

## 2021-10-28 RX ADMIN — IRON SUCROSE 200 MG: 20 INJECTION, SOLUTION INTRAVENOUS at 11:10

## 2021-11-05 ENCOUNTER — CLINICAL SUPPORT (OUTPATIENT)
Dept: URGENT CARE | Facility: CLINIC | Age: 31
End: 2021-11-05
Payer: MEDICAID

## 2021-11-05 DIAGNOSIS — Z20.822 ENCOUNTER FOR LABORATORY TESTING FOR COVID-19 VIRUS: Primary | ICD-10-CM

## 2021-11-05 LAB
CTP QC/QA: YES
SARS-COV-2 RDRP RESP QL NAA+PROBE: NEGATIVE

## 2021-11-05 PROCEDURE — U0002 COVID-19 LAB TEST NON-CDC: HCPCS | Mod: QW,S$GLB,, | Performed by: FAMILY MEDICINE

## 2021-11-05 PROCEDURE — U0002: ICD-10-PCS | Mod: QW,S$GLB,, | Performed by: FAMILY MEDICINE

## 2022-03-03 ENCOUNTER — LAB VISIT (OUTPATIENT)
Dept: LAB | Facility: HOSPITAL | Age: 32
End: 2022-03-03
Attending: INTERNAL MEDICINE
Payer: MEDICAID

## 2022-03-03 DIAGNOSIS — N25.81 SECONDARY HYPERPARATHYROIDISM: ICD-10-CM

## 2022-03-03 DIAGNOSIS — Z11.4 ENCOUNTER FOR SCREENING FOR HIV: ICD-10-CM

## 2022-03-03 DIAGNOSIS — N18.4 CHRONIC KIDNEY DISEASE, STAGE IV (SEVERE): ICD-10-CM

## 2022-03-03 DIAGNOSIS — E55.9 VITAMIN D DEFICIENCY: ICD-10-CM

## 2022-03-03 DIAGNOSIS — E79.0 HYPERURICEMIA: ICD-10-CM

## 2022-03-03 DIAGNOSIS — I10 HYPERTENSION, ESSENTIAL: ICD-10-CM

## 2022-03-03 DIAGNOSIS — R80.9 MICROALBUMINURIA: ICD-10-CM

## 2022-03-03 DIAGNOSIS — N18.4 ANEMIA OF CHRONIC RENAL FAILURE, STAGE 4 (SEVERE): ICD-10-CM

## 2022-03-03 DIAGNOSIS — N25.81 HYPERPARATHYROIDISM, SECONDARY: ICD-10-CM

## 2022-03-03 DIAGNOSIS — D50.9 IRON DEFICIENCY ANEMIA, UNSPECIFIED IRON DEFICIENCY ANEMIA TYPE: ICD-10-CM

## 2022-03-03 DIAGNOSIS — D63.1 ANEMIA OF CHRONIC RENAL FAILURE, STAGE 4 (SEVERE): ICD-10-CM

## 2022-03-03 LAB
ALBUMIN SERPL BCP-MCNC: 3.9 G/DL (ref 3.5–5.2)
ALP SERPL-CCNC: 78 U/L (ref 55–135)
ALT SERPL W/O P-5'-P-CCNC: 12 U/L (ref 10–44)
ANION GAP SERPL CALC-SCNC: 8 MMOL/L (ref 8–16)
AST SERPL-CCNC: 10 U/L (ref 10–40)
BASOPHILS # BLD AUTO: 0.04 K/UL (ref 0–0.2)
BASOPHILS NFR BLD: 0.5 % (ref 0–1.9)
BILIRUB SERPL-MCNC: 0.4 MG/DL (ref 0.1–1)
BUN SERPL-MCNC: 29 MG/DL (ref 6–20)
C3 SERPL-MCNC: 137 MG/DL (ref 50–180)
C4 SERPL-MCNC: 36 MG/DL (ref 11–44)
CALCIUM SERPL-MCNC: 9.5 MG/DL (ref 8.7–10.5)
CHLORIDE SERPL-SCNC: 105 MMOL/L (ref 95–110)
CK SERPL-CCNC: 81 U/L (ref 20–180)
CO2 SERPL-SCNC: 23 MMOL/L (ref 23–29)
CREAT SERPL-MCNC: 2.7 MG/DL (ref 0.5–1.4)
DIFFERENTIAL METHOD: ABNORMAL
EOSINOPHIL # BLD AUTO: 0.5 K/UL (ref 0–0.5)
EOSINOPHIL NFR BLD: 5.4 % (ref 0–8)
ERYTHROCYTE [DISTWIDTH] IN BLOOD BY AUTOMATED COUNT: 14.6 % (ref 11.5–14.5)
EST. GFR  (AFRICAN AMERICAN): 26 ML/MIN/1.73 M^2
EST. GFR  (NON AFRICAN AMERICAN): 22 ML/MIN/1.73 M^2
FERRITIN SERPL-MCNC: 20 NG/ML (ref 20–300)
GLUCOSE SERPL-MCNC: 93 MG/DL (ref 70–110)
HCT VFR BLD AUTO: 40.2 % (ref 37–48.5)
HGB BLD-MCNC: 12.4 G/DL (ref 12–16)
IMM GRANULOCYTES # BLD AUTO: 0.02 K/UL (ref 0–0.04)
IMM GRANULOCYTES NFR BLD AUTO: 0.2 % (ref 0–0.5)
IRON SERPL-MCNC: 51 UG/DL (ref 30–160)
LYMPHOCYTES # BLD AUTO: 2.2 K/UL (ref 1–4.8)
LYMPHOCYTES NFR BLD: 24.8 % (ref 18–48)
MAGNESIUM SERPL-MCNC: 2.1 MG/DL (ref 1.6–2.6)
MCH RBC QN AUTO: 27 PG (ref 27–31)
MCHC RBC AUTO-ENTMCNC: 30.8 G/DL (ref 32–36)
MCV RBC AUTO: 88 FL (ref 82–98)
MONOCYTES # BLD AUTO: 0.6 K/UL (ref 0.3–1)
MONOCYTES NFR BLD: 6.6 % (ref 4–15)
NEUTROPHILS # BLD AUTO: 5.4 K/UL (ref 1.8–7.7)
NEUTROPHILS NFR BLD: 62.5 % (ref 38–73)
NRBC BLD-RTO: 0 /100 WBC
PHOSPHATE SERPL-MCNC: 3.7 MG/DL (ref 2.7–4.5)
PLATELET # BLD AUTO: 369 K/UL (ref 150–450)
PMV BLD AUTO: 11.1 FL (ref 9.2–12.9)
POTASSIUM SERPL-SCNC: 5.4 MMOL/L (ref 3.5–5.1)
PROT SERPL-MCNC: 8.5 G/DL (ref 6–8.4)
RBC # BLD AUTO: 4.59 M/UL (ref 4–5.4)
SATURATED IRON: 15 % (ref 20–50)
SODIUM SERPL-SCNC: 136 MMOL/L (ref 136–145)
TOTAL IRON BINDING CAPACITY: 342 UG/DL (ref 250–450)
TRANSFERRIN SERPL-MCNC: 231 MG/DL (ref 200–375)
WBC # BLD AUTO: 8.67 K/UL (ref 3.9–12.7)

## 2022-03-03 PROCEDURE — 86225 DNA ANTIBODY NATIVE: CPT | Performed by: INTERNAL MEDICINE

## 2022-03-03 PROCEDURE — 86235 NUCLEAR ANTIGEN ANTIBODY: CPT | Mod: 91 | Performed by: INTERNAL MEDICINE

## 2022-03-03 PROCEDURE — 36415 COLL VENOUS BLD VENIPUNCTURE: CPT | Performed by: INTERNAL MEDICINE

## 2022-03-03 PROCEDURE — 83520 IMMUNOASSAY QUANT NOS NONAB: CPT | Performed by: INTERNAL MEDICINE

## 2022-03-03 PROCEDURE — 83520 IMMUNOASSAY QUANT NOS NONAB: CPT | Mod: 91 | Performed by: INTERNAL MEDICINE

## 2022-03-03 PROCEDURE — 86255 FLUORESCENT ANTIBODY SCREEN: CPT | Performed by: INTERNAL MEDICINE

## 2022-03-03 PROCEDURE — 86235 NUCLEAR ANTIGEN ANTIBODY: CPT | Performed by: INTERNAL MEDICINE

## 2022-03-03 PROCEDURE — 86256 FLUORESCENT ANTIBODY TITER: CPT | Performed by: INTERNAL MEDICINE

## 2022-03-03 PROCEDURE — 84100 ASSAY OF PHOSPHORUS: CPT | Performed by: INTERNAL MEDICINE

## 2022-03-03 PROCEDURE — 87389 HIV-1 AG W/HIV-1&-2 AB AG IA: CPT | Performed by: INTERNAL MEDICINE

## 2022-03-03 PROCEDURE — 84244 ASSAY OF RENIN: CPT | Performed by: INTERNAL MEDICINE

## 2022-03-03 PROCEDURE — 86160 COMPLEMENT ANTIGEN: CPT | Mod: 59 | Performed by: INTERNAL MEDICINE

## 2022-03-03 PROCEDURE — 82728 ASSAY OF FERRITIN: CPT | Performed by: INTERNAL MEDICINE

## 2022-03-03 PROCEDURE — 80053 COMPREHEN METABOLIC PANEL: CPT | Performed by: INTERNAL MEDICINE

## 2022-03-03 PROCEDURE — 82550 ASSAY OF CK (CPK): CPT | Performed by: INTERNAL MEDICINE

## 2022-03-03 PROCEDURE — 84466 ASSAY OF TRANSFERRIN: CPT | Performed by: INTERNAL MEDICINE

## 2022-03-03 PROCEDURE — 86235 NUCLEAR ANTIGEN ANTIBODY: CPT | Mod: 59 | Performed by: INTERNAL MEDICINE

## 2022-03-03 PROCEDURE — 85025 COMPLETE CBC W/AUTO DIFF WBC: CPT | Performed by: INTERNAL MEDICINE

## 2022-03-03 PROCEDURE — 83735 ASSAY OF MAGNESIUM: CPT | Performed by: INTERNAL MEDICINE

## 2022-03-03 PROCEDURE — 86160 COMPLEMENT ANTIGEN: CPT | Performed by: INTERNAL MEDICINE

## 2022-03-04 LAB
ANTI SM/RNP ANTIBODY: 0.07 RATIO (ref 0–0.99)
ANTI-SM/RNP INTERPRETATION: NEGATIVE
ANTI-SSA ANTIBODY: 0.07 RATIO (ref 0–0.99)
ANTI-SSA INTERPRETATION: NEGATIVE
ANTI-SSB ANTIBODY: 0.06 RATIO (ref 0–0.99)
ANTI-SSB INTERPRETATION: NEGATIVE
DSDNA AB SER-ACNC: NORMAL [IU]/ML
HIV 1+2 AB+HIV1 P24 AG SERPL QL IA: NEGATIVE

## 2022-03-05 LAB — ENA JO1 IGG SER-ACNC: <0.2 U

## 2022-03-07 LAB
ANCA AB TITR SER IF: NORMAL TITER
ANTI-CENTROMERE ANTIBODY: NEGATIVE
CENTROMERE AB TITR SER IF: NORMAL TITER
ENA SCL70 AB SER-ACNC: 8 UNITS
P-ANCA TITR SER IF: NORMAL TITER

## 2022-03-09 LAB
ALDOST SERPL-MCNC: 115 NG/DL
ALDOST/RENIN PLAS-RTO: 38.3 RATIO
PHOSPHOLIPASE A2 RECEPTOR, ELISA: <2 RU/ML
PHOSPHOLIPASE A2 RECEPTOR, IFA: NEGATIVE
RENIN PLAS-CCNC: 3 NG/ML/HR

## 2022-03-11 LAB — RNAP III AB SER-ACNC: <20 UNITS

## 2022-09-01 ENCOUNTER — LAB VISIT (OUTPATIENT)
Dept: LAB | Facility: HOSPITAL | Age: 32
End: 2022-09-01
Attending: INTERNAL MEDICINE
Payer: MEDICAID

## 2022-09-01 DIAGNOSIS — N18.4 ANEMIA OF CHRONIC RENAL FAILURE, STAGE 4 (SEVERE): ICD-10-CM

## 2022-09-01 DIAGNOSIS — E79.0 HYPERURICEMIA: ICD-10-CM

## 2022-09-01 DIAGNOSIS — N18.4 CHRONIC KIDNEY DISEASE, STAGE IV (SEVERE): ICD-10-CM

## 2022-09-01 DIAGNOSIS — E55.9 VITAMIN D DEFICIENCY: ICD-10-CM

## 2022-09-01 DIAGNOSIS — R80.9 MICROALBUMINURIA: ICD-10-CM

## 2022-09-01 DIAGNOSIS — I10 HYPERTENSION, ESSENTIAL: ICD-10-CM

## 2022-09-01 DIAGNOSIS — D63.1 ANEMIA OF CHRONIC RENAL FAILURE, STAGE 4 (SEVERE): ICD-10-CM

## 2022-09-01 DIAGNOSIS — N25.81 HYPERPARATHYROIDISM, SECONDARY: ICD-10-CM

## 2022-09-01 DIAGNOSIS — D50.9 IRON DEFICIENCY ANEMIA, UNSPECIFIED IRON DEFICIENCY ANEMIA TYPE: ICD-10-CM

## 2022-09-01 LAB
ALBUMIN SERPL BCP-MCNC: 3.9 G/DL (ref 3.5–5.2)
ALP SERPL-CCNC: 79 U/L (ref 55–135)
ALT SERPL W/O P-5'-P-CCNC: 14 U/L (ref 10–44)
ANION GAP SERPL CALC-SCNC: 9 MMOL/L (ref 8–16)
AST SERPL-CCNC: 15 U/L (ref 10–40)
BACTERIA #/AREA URNS HPF: ABNORMAL /HPF
BASOPHILS # BLD AUTO: 0.04 K/UL (ref 0–0.2)
BASOPHILS NFR BLD: 0.4 % (ref 0–1.9)
BILIRUB SERPL-MCNC: 0.3 MG/DL (ref 0.1–1)
BILIRUB UR QL STRIP: NEGATIVE
BUN SERPL-MCNC: 17 MG/DL (ref 6–20)
CALCIUM SERPL-MCNC: 9.4 MG/DL (ref 8.7–10.5)
CHLORIDE SERPL-SCNC: 106 MMOL/L (ref 95–110)
CLARITY UR: CLEAR
CO2 SERPL-SCNC: 22 MMOL/L (ref 23–29)
COLLECT DURATION TIME UR: 1 H
COLLECT DURATION TIME UR: 1 H
COLOR UR: YELLOW
CREAT SERPL-MCNC: 2.7 MG/DL (ref 0.5–1.4)
CREAT UR-MCNC: 303.6 MG/DL (ref 15–325)
DIFFERENTIAL METHOD: ABNORMAL
EOSINOPHIL # BLD AUTO: 0.3 K/UL (ref 0–0.5)
EOSINOPHIL NFR BLD: 2.8 % (ref 0–8)
ERYTHROCYTE [DISTWIDTH] IN BLOOD BY AUTOMATED COUNT: 15.3 % (ref 11.5–14.5)
EST. GFR  (NO RACE VARIABLE): 23 ML/MIN/1.73 M^2
GLUCOSE SERPL-MCNC: 100 MG/DL (ref 70–110)
GLUCOSE UR QL STRIP: NEGATIVE
HCT VFR BLD AUTO: 37.8 % (ref 37–48.5)
HGB BLD-MCNC: 12 G/DL (ref 12–16)
HGB UR QL STRIP: NEGATIVE
HYALINE CASTS #/AREA URNS LPF: 2 /LPF
IMM GRANULOCYTES # BLD AUTO: 0.02 K/UL (ref 0–0.04)
IMM GRANULOCYTES NFR BLD AUTO: 0.2 % (ref 0–0.5)
KETONES UR QL STRIP: NEGATIVE
LEUKOCYTE ESTERASE UR QL STRIP: ABNORMAL
LYMPHOCYTES # BLD AUTO: 2.2 K/UL (ref 1–4.8)
LYMPHOCYTES NFR BLD: 23.9 % (ref 18–48)
MAGNESIUM SERPL-MCNC: 1.7 MG/DL (ref 1.6–2.6)
MCH RBC QN AUTO: 25.8 PG (ref 27–31)
MCHC RBC AUTO-ENTMCNC: 31.7 G/DL (ref 32–36)
MCV RBC AUTO: 81 FL (ref 82–98)
MICROSCOPIC COMMENT: ABNORMAL
MONOCYTES # BLD AUTO: 0.6 K/UL (ref 0.3–1)
MONOCYTES NFR BLD: 6.5 % (ref 4–15)
NEUTROPHILS # BLD AUTO: 6 K/UL (ref 1.8–7.7)
NEUTROPHILS NFR BLD: 66.2 % (ref 38–73)
NITRITE UR QL STRIP: NEGATIVE
NRBC BLD-RTO: 0 /100 WBC
PH UR STRIP: 7 [PH] (ref 5–8)
PHOSPHATE SERPL-MCNC: 3.3 MG/DL (ref 2.7–4.5)
PLATELET # BLD AUTO: 360 K/UL (ref 150–450)
PMV BLD AUTO: 10.6 FL (ref 9.2–12.9)
POTASSIUM SERPL-SCNC: 3.9 MMOL/L (ref 3.5–5.1)
PROT SERPL-MCNC: 8 G/DL (ref 6–8.4)
PROT UR QL STRIP: ABNORMAL
PROT UR-MCNC: 586 MG/DL
PROT/CREAT UR: 1.93 MG/G{CREAT} (ref 0–0.2)
PTH-INTACT SERPL-MCNC: 380.1 PG/ML (ref 9–77)
RBC # BLD AUTO: 4.65 M/UL (ref 4–5.4)
RBC #/AREA URNS HPF: 4 /HPF (ref 0–4)
SODIUM SERPL-SCNC: 137 MMOL/L (ref 136–145)
SP GR UR STRIP: 1.02 (ref 1–1.03)
SPECIMEN VOL 24H UR: NORMAL L
SQUAMOUS #/AREA URNS HPF: 4 /HPF
URATE SERPL-MCNC: 8.6 MG/DL (ref 2.4–5.7)
URN SPEC COLLECT METH UR: ABNORMAL
UROBILINOGEN UR STRIP-ACNC: NEGATIVE EU/DL
WBC # BLD AUTO: 9.09 K/UL (ref 3.9–12.7)
WBC #/AREA URNS HPF: 28 /HPF (ref 0–5)
WBC CLUMPS URNS QL MICRO: ABNORMAL

## 2022-09-01 PROCEDURE — 80053 COMPREHEN METABOLIC PANEL: CPT | Performed by: INTERNAL MEDICINE

## 2022-09-01 PROCEDURE — 84156 ASSAY OF PROTEIN URINE: CPT | Performed by: INTERNAL MEDICINE

## 2022-09-01 PROCEDURE — 83970 ASSAY OF PARATHORMONE: CPT | Performed by: INTERNAL MEDICINE

## 2022-09-01 PROCEDURE — 81000 URINALYSIS NONAUTO W/SCOPE: CPT | Performed by: INTERNAL MEDICINE

## 2022-09-01 PROCEDURE — 82384 ASSAY THREE CATECHOLAMINES: CPT | Mod: 91 | Performed by: INTERNAL MEDICINE

## 2022-09-01 PROCEDURE — 36415 COLL VENOUS BLD VENIPUNCTURE: CPT | Performed by: INTERNAL MEDICINE

## 2022-09-01 PROCEDURE — 84100 ASSAY OF PHOSPHORUS: CPT | Performed by: INTERNAL MEDICINE

## 2022-09-01 PROCEDURE — 83835 ASSAY OF METANEPHRINES: CPT | Mod: 91 | Performed by: INTERNAL MEDICINE

## 2022-09-01 PROCEDURE — 83835 ASSAY OF METANEPHRINES: CPT | Performed by: INTERNAL MEDICINE

## 2022-09-01 PROCEDURE — 82384 ASSAY THREE CATECHOLAMINES: CPT | Performed by: INTERNAL MEDICINE

## 2022-09-01 PROCEDURE — 84550 ASSAY OF BLOOD/URIC ACID: CPT | Performed by: INTERNAL MEDICINE

## 2022-09-01 PROCEDURE — 83735 ASSAY OF MAGNESIUM: CPT | Performed by: INTERNAL MEDICINE

## 2022-09-01 PROCEDURE — 84244 ASSAY OF RENIN: CPT | Performed by: INTERNAL MEDICINE

## 2022-09-01 PROCEDURE — 85025 COMPLETE CBC W/AUTO DIFF WBC: CPT | Performed by: INTERNAL MEDICINE

## 2022-09-06 ENCOUNTER — TELEPHONE (OUTPATIENT)
Dept: ENDOCRINOLOGY | Facility: CLINIC | Age: 32
End: 2022-09-06
Payer: MEDICAID

## 2022-09-06 LAB
ALDOST SERPL-MCNC: 237 NG/DL
ALDOST/RENIN PLAS-RTO: 17.6 RATIO
RENIN PLAS-CCNC: 13.5 NG/ML/HR

## 2022-09-09 LAB
CATECHOLS PLAS-MCNC: 234 PG/ML
DOPAMINE SERPL-MCNC: <10 PG/ML
EPINEPH PLAS-MCNC: 26 PG/ML
METANEPH FREE SERPL-MCNC: 29 PG/ML
METANEPHS SERPL-MCNC: 80 PG/ML
NOREPINEPH PLAS-MCNC: 208 PG/ML
NORMETANEPH FREE SERPL-MCNC: 51 PG/ML

## 2022-09-16 ENCOUNTER — TELEPHONE (OUTPATIENT)
Dept: BARIATRICS | Facility: CLINIC | Age: 32
End: 2022-09-16
Payer: MEDICAID

## 2022-10-12 PROBLEM — E26.9 HYPERALDOSTERONISM: Status: ACTIVE | Noted: 2022-10-12

## 2022-10-18 ENCOUNTER — TELEPHONE (OUTPATIENT)
Dept: ENDOCRINOLOGY | Facility: CLINIC | Age: 32
End: 2022-10-18
Payer: MEDICAID

## 2022-10-18 NOTE — TELEPHONE ENCOUNTER
----- Message from Warren Sue MA sent at 10/18/2022 10:40 AM CDT -----  I know this person outside of [facility]. Am I allowed to view her chart to schedule her?   ----- Message -----  From: Natalee Melara MA  Sent: 10/13/2022   6:37 PM CDT  To: Warren Sue MA      ----- Message -----  From: [facility]  Sent: 10/13/2022   2:17 PM CDT  To: Josie Gore Staff    Type:  Needs Medical Advice    Who Called: pt   Would the patient rather a call back or a response via [facility]? Callback   Best Call Back Number: [phone]  Additional Information: pt requesting callback from office to discuss rescheduling missed appt.

## 2022-10-18 NOTE — TELEPHONE ENCOUNTER
Left voice mail that we are booked up right now in Endo she can call us back on November 1st for our March schedule

## 2022-11-07 ENCOUNTER — OFFICE VISIT (OUTPATIENT)
Dept: URGENT CARE | Facility: CLINIC | Age: 32
End: 2022-11-07
Payer: MEDICAID

## 2022-11-07 VITALS
SYSTOLIC BLOOD PRESSURE: 139 MMHG | TEMPERATURE: 99 F | HEART RATE: 70 BPM | OXYGEN SATURATION: 98 % | WEIGHT: 293 LBS | HEIGHT: 70 IN | BODY MASS INDEX: 41.95 KG/M2 | RESPIRATION RATE: 18 BRPM | DIASTOLIC BLOOD PRESSURE: 92 MMHG

## 2022-11-07 DIAGNOSIS — L03.114 CELLULITIS OF LEFT ARM: Primary | ICD-10-CM

## 2022-11-07 PROCEDURE — 3008F PR BODY MASS INDEX (BMI) DOCUMENTED: ICD-10-PCS | Mod: CPTII,S$GLB,,

## 2022-11-07 PROCEDURE — 99203 PR OFFICE/OUTPT VISIT, NEW, LEVL III, 30-44 MIN: ICD-10-PCS | Mod: S$GLB,,,

## 2022-11-07 PROCEDURE — 99203 OFFICE O/P NEW LOW 30 MIN: CPT | Mod: S$GLB,,,

## 2022-11-07 PROCEDURE — 3075F SYST BP GE 130 - 139MM HG: CPT | Mod: CPTII,S$GLB,,

## 2022-11-07 PROCEDURE — 4010F PR ACE/ARB THEARPY RXD/TAKEN: ICD-10-PCS | Mod: CPTII,S$GLB,,

## 2022-11-07 PROCEDURE — 3066F NEPHROPATHY DOC TX: CPT | Mod: CPTII,S$GLB,,

## 2022-11-07 PROCEDURE — 3080F DIAST BP >= 90 MM HG: CPT | Mod: CPTII,S$GLB,,

## 2022-11-07 PROCEDURE — 3075F PR MOST RECENT SYSTOLIC BLOOD PRESS GE 130-139MM HG: ICD-10-PCS | Mod: CPTII,S$GLB,,

## 2022-11-07 PROCEDURE — 1159F PR MEDICATION LIST DOCUMENTED IN MEDICAL RECORD: ICD-10-PCS | Mod: CPTII,S$GLB,,

## 2022-11-07 PROCEDURE — 3008F BODY MASS INDEX DOCD: CPT | Mod: CPTII,S$GLB,,

## 2022-11-07 PROCEDURE — 1159F MED LIST DOCD IN RCRD: CPT | Mod: CPTII,S$GLB,,

## 2022-11-07 PROCEDURE — 1160F PR REVIEW ALL MEDS BY PRESCRIBER/CLIN PHARMACIST DOCUMENTED: ICD-10-PCS | Mod: CPTII,S$GLB,,

## 2022-11-07 PROCEDURE — 1160F RVW MEDS BY RX/DR IN RCRD: CPT | Mod: CPTII,S$GLB,,

## 2022-11-07 PROCEDURE — 3066F PR DOCUMENTATION OF TREATMENT FOR NEPHROPATHY: ICD-10-PCS | Mod: CPTII,S$GLB,,

## 2022-11-07 PROCEDURE — 4010F ACE/ARB THERAPY RXD/TAKEN: CPT | Mod: CPTII,S$GLB,,

## 2022-11-07 PROCEDURE — 3080F PR MOST RECENT DIASTOLIC BLOOD PRESSURE >= 90 MM HG: ICD-10-PCS | Mod: CPTII,S$GLB,,

## 2022-11-07 RX ORDER — DOXYCYCLINE 100 MG/1
100 CAPSULE ORAL 2 TIMES DAILY
Qty: 20 CAPSULE | Refills: 0 | Status: SHIPPED | OUTPATIENT
Start: 2022-11-07 | End: 2022-11-17

## 2022-11-07 RX ORDER — MUPIROCIN 20 MG/G
OINTMENT TOPICAL 3 TIMES DAILY
Qty: 22 G | Refills: 0 | Status: SHIPPED | OUTPATIENT
Start: 2022-11-07 | End: 2022-11-14

## 2022-11-08 NOTE — PROGRESS NOTES
"Subjective:       Patient ID: Kaylie Fuentes is a 32 y.o. female.    Vitals:  height is 5' 10" (1.778 m) and weight is 136.1 kg (300 lb). Her oral temperature is 98.6 °F (37 °C). Her blood pressure is 139/92 (abnormal) and her pulse is 70. Her respiration is 18 and oxygen saturation is 98%.     Chief Complaint: Insect Bite    Pt states she was bite by something 3 days ago. The bite locations is her left arm and it is red, warm and swollen. It is a 7/10 if not touched and a 10/10 pain if she touches it. She has taken anything for the pain/ bite.    Insect Bite  This is a new problem. The current episode started in the past 7 days. The problem occurs constantly. The problem has been gradually worsening. Pertinent negatives include no diaphoresis, fever, nausea, numbness, rash or weakness. The symptoms are aggravated by bending. She has tried nothing for the symptoms. The treatment provided no relief.     Constitution: Negative for sweating and fever.   Gastrointestinal:  Negative for nausea.   Skin:  Positive for erythema. Negative for rash.   Neurological:  Negative for numbness.     Objective:      Physical Exam   Constitutional: She is oriented to person, place, and time. She appears well-developed.   HENT:   Head: Normocephalic and atraumatic. Head is without abrasion, without contusion and without laceration.   Ears:   Right Ear: External ear normal.   Left Ear: External ear normal.   Nose: Nose normal.   Mouth/Throat: Oropharynx is clear and moist and mucous membranes are normal.   Eyes: Conjunctivae, EOM and lids are normal. Pupils are equal, round, and reactive to light.   Neck: Trachea normal and phonation normal. Neck supple.   Cardiovascular: Normal rate, regular rhythm and normal heart sounds.   Pulmonary/Chest: Effort normal and breath sounds normal. No stridor. No respiratory distress.   Musculoskeletal: Normal range of motion.         General: Normal range of motion.   Neurological: She is alert and " oriented to person, place, and time.   Skin: Skin is warm, dry, intact and no rash. Capillary refill takes less than 2 seconds. erythema No abrasion, No burn, No bruising and No ecchymosis        Psychiatric: Her speech is normal and behavior is normal. Judgment and thought content normal.   Nursing note and vitals reviewed.      Assessment:       1. Cellulitis of left arm          Plan:       No fluctuant abscess for drainage today.  Will treat patient for cellulitis secondary to insect bite.  Discussed warm compresses and alternating ice as well as taking Tylenol for pain.  Patient and mother verbalized understanding and agreed to treatment plan.  Cellulitis of left arm  -     doxycycline (VIBRAMYCIN) 100 MG Cap; Take 1 capsule (100 mg total) by mouth 2 (two) times daily. for 10 days  Dispense: 20 capsule; Refill: 0  -     mupirocin (BACTROBAN) 2 % ointment; Apply topically 3 (three) times daily. for 7 days  Dispense: 22 g; Refill: 0

## 2022-12-16 ENCOUNTER — LAB VISIT (OUTPATIENT)
Dept: LAB | Facility: HOSPITAL | Age: 32
End: 2022-12-16
Attending: INTERNAL MEDICINE
Payer: MEDICARE

## 2022-12-16 DIAGNOSIS — D63.1 ANEMIA OF CHRONIC RENAL FAILURE, STAGE 4 (SEVERE): ICD-10-CM

## 2022-12-16 DIAGNOSIS — E87.5 HYPERKALEMIA: ICD-10-CM

## 2022-12-16 DIAGNOSIS — N18.4 CHRONIC KIDNEY DISEASE, STAGE IV (SEVERE): ICD-10-CM

## 2022-12-16 DIAGNOSIS — E79.0 HYPERURICEMIA: ICD-10-CM

## 2022-12-16 DIAGNOSIS — N18.4 ANEMIA OF CHRONIC RENAL FAILURE, STAGE 4 (SEVERE): ICD-10-CM

## 2022-12-16 DIAGNOSIS — I10 HYPERTENSION, ESSENTIAL: ICD-10-CM

## 2022-12-16 DIAGNOSIS — R80.9 MICROALBUMINURIA: ICD-10-CM

## 2022-12-16 DIAGNOSIS — N25.81 HYPERPARATHYROIDISM, SECONDARY: ICD-10-CM

## 2022-12-16 DIAGNOSIS — D50.9 IRON DEFICIENCY ANEMIA, UNSPECIFIED IRON DEFICIENCY ANEMIA TYPE: ICD-10-CM

## 2022-12-16 DIAGNOSIS — E55.9 VITAMIN D DEFICIENCY: ICD-10-CM

## 2022-12-16 LAB
ALBUMIN SERPL BCP-MCNC: 3.9 G/DL (ref 3.5–5.2)
ALP SERPL-CCNC: 83 U/L (ref 55–135)
ALT SERPL W/O P-5'-P-CCNC: 8 U/L (ref 10–44)
ANION GAP SERPL CALC-SCNC: 5 MMOL/L (ref 8–16)
AST SERPL-CCNC: 9 U/L (ref 10–40)
BACTERIA #/AREA URNS HPF: ABNORMAL /HPF
BASOPHILS # BLD AUTO: 0.04 K/UL (ref 0–0.2)
BASOPHILS NFR BLD: 0.4 % (ref 0–1.9)
BILIRUB SERPL-MCNC: 0.4 MG/DL (ref 0.1–1)
BILIRUB UR QL STRIP: NEGATIVE
BUN SERPL-MCNC: 23 MG/DL (ref 6–20)
CALCIUM SERPL-MCNC: 9.5 MG/DL (ref 8.7–10.5)
CHLORIDE SERPL-SCNC: 109 MMOL/L (ref 95–110)
CLARITY UR: CLEAR
CO2 SERPL-SCNC: 22 MMOL/L (ref 23–29)
COLOR UR: COLORLESS
CREAT SERPL-MCNC: 2.6 MG/DL (ref 0.5–1.4)
CREAT UR-MCNC: 56.1 MG/DL (ref 15–325)
DIFFERENTIAL METHOD: ABNORMAL
EOSINOPHIL # BLD AUTO: 0.3 K/UL (ref 0–0.5)
EOSINOPHIL NFR BLD: 2.6 % (ref 0–8)
ERYTHROCYTE [DISTWIDTH] IN BLOOD BY AUTOMATED COUNT: 14.7 % (ref 11.5–14.5)
EST. GFR  (NO RACE VARIABLE): 24 ML/MIN/1.73 M^2
GLUCOSE SERPL-MCNC: 85 MG/DL (ref 70–110)
GLUCOSE UR QL STRIP: NEGATIVE
HCT VFR BLD AUTO: 37.1 % (ref 37–48.5)
HGB BLD-MCNC: 11.4 G/DL (ref 12–16)
HGB UR QL STRIP: ABNORMAL
HYALINE CASTS #/AREA URNS LPF: 3 /LPF
IMM GRANULOCYTES # BLD AUTO: 0.02 K/UL (ref 0–0.04)
IMM GRANULOCYTES NFR BLD AUTO: 0.2 % (ref 0–0.5)
KETONES UR QL STRIP: NEGATIVE
LEUKOCYTE ESTERASE UR QL STRIP: NEGATIVE
LYMPHOCYTES # BLD AUTO: 2 K/UL (ref 1–4.8)
LYMPHOCYTES NFR BLD: 20.4 % (ref 18–48)
MAGNESIUM SERPL-MCNC: 1.8 MG/DL (ref 1.6–2.6)
MCH RBC QN AUTO: 25.7 PG (ref 27–31)
MCHC RBC AUTO-ENTMCNC: 30.7 G/DL (ref 32–36)
MCV RBC AUTO: 84 FL (ref 82–98)
MICROSCOPIC COMMENT: ABNORMAL
MONOCYTES # BLD AUTO: 0.9 K/UL (ref 0.3–1)
MONOCYTES NFR BLD: 9.3 % (ref 4–15)
NEUTROPHILS # BLD AUTO: 6.5 K/UL (ref 1.8–7.7)
NEUTROPHILS NFR BLD: 67.1 % (ref 38–73)
NITRITE UR QL STRIP: NEGATIVE
NRBC BLD-RTO: 0 /100 WBC
PH UR STRIP: 6 [PH] (ref 5–8)
PHOSPHATE SERPL-MCNC: 3.7 MG/DL (ref 2.7–4.5)
PLATELET # BLD AUTO: 383 K/UL (ref 150–450)
PMV BLD AUTO: 11 FL (ref 9.2–12.9)
POTASSIUM SERPL-SCNC: 4.8 MMOL/L (ref 3.5–5.1)
PROT SERPL-MCNC: 8 G/DL (ref 6–8.4)
PROT UR QL STRIP: ABNORMAL
PROT UR-MCNC: 65 MG/DL
PROT/CREAT UR: 1.16 MG/G{CREAT} (ref 0–0.2)
RBC # BLD AUTO: 4.44 M/UL (ref 4–5.4)
RBC #/AREA URNS HPF: >100 /HPF (ref 0–4)
SODIUM SERPL-SCNC: 136 MMOL/L (ref 136–145)
SP GR UR STRIP: 1.01 (ref 1–1.03)
SQUAMOUS #/AREA URNS HPF: 6 /HPF
URN SPEC COLLECT METH UR: ABNORMAL
UROBILINOGEN UR STRIP-ACNC: NEGATIVE EU/DL
WBC # BLD AUTO: 9.71 K/UL (ref 3.9–12.7)
WBC #/AREA URNS HPF: 2 /HPF (ref 0–5)

## 2022-12-16 PROCEDURE — 85025 COMPLETE CBC W/AUTO DIFF WBC: CPT | Performed by: INTERNAL MEDICINE

## 2022-12-16 PROCEDURE — 84156 ASSAY OF PROTEIN URINE: CPT | Performed by: INTERNAL MEDICINE

## 2022-12-16 PROCEDURE — 84100 ASSAY OF PHOSPHORUS: CPT | Performed by: INTERNAL MEDICINE

## 2022-12-16 PROCEDURE — 36415 COLL VENOUS BLD VENIPUNCTURE: CPT | Performed by: INTERNAL MEDICINE

## 2022-12-16 PROCEDURE — 80053 COMPREHEN METABOLIC PANEL: CPT | Performed by: INTERNAL MEDICINE

## 2022-12-16 PROCEDURE — 83735 ASSAY OF MAGNESIUM: CPT | Performed by: INTERNAL MEDICINE

## 2022-12-16 PROCEDURE — 81000 URINALYSIS NONAUTO W/SCOPE: CPT | Performed by: INTERNAL MEDICINE

## 2023-02-11 ENCOUNTER — LAB VISIT (OUTPATIENT)
Dept: LAB | Facility: HOSPITAL | Age: 33
End: 2023-02-11
Attending: GENERAL PRACTICE
Payer: MEDICARE

## 2023-02-11 DIAGNOSIS — N25.81 HYPERPARATHYROIDISM, SECONDARY: ICD-10-CM

## 2023-02-11 DIAGNOSIS — D50.9 IRON DEFICIENCY ANEMIA, UNSPECIFIED IRON DEFICIENCY ANEMIA TYPE: ICD-10-CM

## 2023-02-11 DIAGNOSIS — D63.1 ANEMIA OF CHRONIC RENAL FAILURE, STAGE 4 (SEVERE): ICD-10-CM

## 2023-02-11 DIAGNOSIS — E87.5 HYPERKALEMIA: ICD-10-CM

## 2023-02-11 DIAGNOSIS — R80.9 MICROALBUMINURIA: ICD-10-CM

## 2023-02-11 DIAGNOSIS — E55.9 VITAMIN D DEFICIENCY: ICD-10-CM

## 2023-02-11 DIAGNOSIS — N18.4 CHRONIC KIDNEY DISEASE, STAGE IV (SEVERE): ICD-10-CM

## 2023-02-11 DIAGNOSIS — I10 HYPERTENSION, ESSENTIAL: ICD-10-CM

## 2023-02-11 DIAGNOSIS — E79.0 HYPERURICEMIA: ICD-10-CM

## 2023-02-11 DIAGNOSIS — N18.4 ANEMIA OF CHRONIC RENAL FAILURE, STAGE 4 (SEVERE): ICD-10-CM

## 2023-02-11 DIAGNOSIS — E26.9 HYPERALDOSTERONISM, UNSPECIFIED: ICD-10-CM

## 2023-02-11 LAB
ALBUMIN SERPL BCP-MCNC: 4 G/DL (ref 3.5–5.2)
ALP SERPL-CCNC: 69 U/L (ref 55–135)
ALT SERPL W/O P-5'-P-CCNC: 6 U/L (ref 10–44)
ANION GAP SERPL CALC-SCNC: 9 MMOL/L (ref 8–16)
AST SERPL-CCNC: 10 U/L (ref 10–40)
BACTERIA #/AREA URNS HPF: NORMAL /HPF
BASOPHILS # BLD AUTO: 0.04 K/UL (ref 0–0.2)
BASOPHILS NFR BLD: 0.4 % (ref 0–1.9)
BILIRUB SERPL-MCNC: 0.3 MG/DL (ref 0.1–1)
BILIRUB UR QL STRIP: NEGATIVE
BUN SERPL-MCNC: 27 MG/DL (ref 6–20)
CALCIUM SERPL-MCNC: 9.3 MG/DL (ref 8.7–10.5)
CHLORIDE SERPL-SCNC: 108 MMOL/L (ref 95–110)
CLARITY UR: ABNORMAL
CO2 SERPL-SCNC: 21 MMOL/L (ref 23–29)
COLOR UR: YELLOW
CREAT SERPL-MCNC: 2.8 MG/DL (ref 0.5–1.4)
CREAT UR-MCNC: 177.8 MG/DL (ref 15–325)
DIFFERENTIAL METHOD: ABNORMAL
EOSINOPHIL # BLD AUTO: 0.4 K/UL (ref 0–0.5)
EOSINOPHIL NFR BLD: 3.9 % (ref 0–8)
ERYTHROCYTE [DISTWIDTH] IN BLOOD BY AUTOMATED COUNT: 14.7 % (ref 11.5–14.5)
EST. GFR  (NO RACE VARIABLE): 22 ML/MIN/1.73 M^2
GLUCOSE SERPL-MCNC: 78 MG/DL (ref 70–110)
GLUCOSE UR QL STRIP: NEGATIVE
HCT VFR BLD AUTO: 35.9 % (ref 37–48.5)
HGB BLD-MCNC: 11.1 G/DL (ref 12–16)
HGB UR QL STRIP: ABNORMAL
HYALINE CASTS #/AREA URNS LPF: 0 /LPF
IMM GRANULOCYTES # BLD AUTO: 0.02 K/UL (ref 0–0.04)
IMM GRANULOCYTES NFR BLD AUTO: 0.2 % (ref 0–0.5)
KETONES UR QL STRIP: NEGATIVE
LEUKOCYTE ESTERASE UR QL STRIP: NEGATIVE
LYMPHOCYTES # BLD AUTO: 2.5 K/UL (ref 1–4.8)
LYMPHOCYTES NFR BLD: 26.1 % (ref 18–48)
MAGNESIUM SERPL-MCNC: 1.9 MG/DL (ref 1.6–2.6)
MCH RBC QN AUTO: 26.1 PG (ref 27–31)
MCHC RBC AUTO-ENTMCNC: 30.9 G/DL (ref 32–36)
MCV RBC AUTO: 84 FL (ref 82–98)
MICROSCOPIC COMMENT: NORMAL
MONOCYTES # BLD AUTO: 0.7 K/UL (ref 0.3–1)
MONOCYTES NFR BLD: 7 % (ref 4–15)
NEUTROPHILS # BLD AUTO: 5.9 K/UL (ref 1.8–7.7)
NEUTROPHILS NFR BLD: 62.4 % (ref 38–73)
NITRITE UR QL STRIP: NEGATIVE
NRBC BLD-RTO: 0 /100 WBC
PH UR STRIP: 6 [PH] (ref 5–8)
PHOSPHATE SERPL-MCNC: 2.9 MG/DL (ref 2.7–4.5)
PLATELET # BLD AUTO: 414 K/UL (ref 150–450)
PMV BLD AUTO: 10.8 FL (ref 9.2–12.9)
POTASSIUM SERPL-SCNC: 4.3 MMOL/L (ref 3.5–5.1)
PROT SERPL-MCNC: 8.1 G/DL (ref 6–8.4)
PROT UR QL STRIP: ABNORMAL
PROT UR-MCNC: 256 MG/DL
PROT/CREAT UR: 1.44 MG/G{CREAT} (ref 0–0.2)
RBC # BLD AUTO: 4.26 M/UL (ref 4–5.4)
RBC #/AREA URNS HPF: 2 /HPF (ref 0–4)
SODIUM SERPL-SCNC: 138 MMOL/L (ref 136–145)
SP GR UR STRIP: 1.01 (ref 1–1.03)
SQUAMOUS #/AREA URNS HPF: 12 /HPF
URATE SERPL-MCNC: 9.2 MG/DL (ref 2.4–5.7)
URN SPEC COLLECT METH UR: ABNORMAL
UROBILINOGEN UR STRIP-ACNC: NEGATIVE EU/DL
WBC # BLD AUTO: 9.43 K/UL (ref 3.9–12.7)
WBC #/AREA URNS HPF: 3 /HPF (ref 0–5)

## 2023-02-11 PROCEDURE — 81000 URINALYSIS NONAUTO W/SCOPE: CPT | Performed by: GENERAL PRACTICE

## 2023-02-11 PROCEDURE — 36415 COLL VENOUS BLD VENIPUNCTURE: CPT | Performed by: GENERAL PRACTICE

## 2023-02-11 PROCEDURE — 82306 VITAMIN D 25 HYDROXY: CPT | Performed by: GENERAL PRACTICE

## 2023-02-11 PROCEDURE — 82570 ASSAY OF URINE CREATININE: CPT | Performed by: GENERAL PRACTICE

## 2023-02-11 PROCEDURE — 83735 ASSAY OF MAGNESIUM: CPT | Performed by: GENERAL PRACTICE

## 2023-02-11 PROCEDURE — 85025 COMPLETE CBC W/AUTO DIFF WBC: CPT | Performed by: GENERAL PRACTICE

## 2023-02-11 PROCEDURE — 80053 COMPREHEN METABOLIC PANEL: CPT | Performed by: GENERAL PRACTICE

## 2023-02-11 PROCEDURE — 84100 ASSAY OF PHOSPHORUS: CPT | Performed by: GENERAL PRACTICE

## 2023-02-11 PROCEDURE — 83970 ASSAY OF PARATHORMONE: CPT | Performed by: GENERAL PRACTICE

## 2023-02-11 PROCEDURE — 84550 ASSAY OF BLOOD/URIC ACID: CPT | Performed by: GENERAL PRACTICE

## 2023-02-12 LAB — 25(OH)D3+25(OH)D2 SERPL-MCNC: 33 NG/ML (ref 30–96)

## 2023-02-13 LAB — PTH-INTACT SERPL-MCNC: 333.8 PG/ML (ref 9–77)

## 2023-04-28 ENCOUNTER — LAB VISIT (OUTPATIENT)
Dept: LAB | Facility: HOSPITAL | Age: 33
End: 2023-04-28
Attending: INTERNAL MEDICINE
Payer: MEDICARE

## 2023-04-28 DIAGNOSIS — N25.81 HYPERPARATHYROIDISM, SECONDARY: ICD-10-CM

## 2023-04-28 DIAGNOSIS — E79.0 HYPERURICEMIA: ICD-10-CM

## 2023-04-28 DIAGNOSIS — E26.9 HYPERALDOSTERONISM, UNSPECIFIED: ICD-10-CM

## 2023-04-28 DIAGNOSIS — E55.9 VITAMIN D DEFICIENCY: ICD-10-CM

## 2023-04-28 DIAGNOSIS — D50.9 IRON DEFICIENCY ANEMIA, UNSPECIFIED IRON DEFICIENCY ANEMIA TYPE: ICD-10-CM

## 2023-04-28 DIAGNOSIS — D63.1 ANEMIA OF CHRONIC RENAL FAILURE, STAGE 4 (SEVERE): ICD-10-CM

## 2023-04-28 DIAGNOSIS — R80.9 MICROALBUMINURIA: ICD-10-CM

## 2023-04-28 DIAGNOSIS — I10 HYPERTENSION, ESSENTIAL: ICD-10-CM

## 2023-04-28 DIAGNOSIS — E87.5 HYPERKALEMIA: ICD-10-CM

## 2023-04-28 DIAGNOSIS — N18.4 ANEMIA OF CHRONIC RENAL FAILURE, STAGE 4 (SEVERE): ICD-10-CM

## 2023-04-28 DIAGNOSIS — N18.4 CHRONIC KIDNEY DISEASE, STAGE IV (SEVERE): ICD-10-CM

## 2023-04-28 LAB
ALBUMIN SERPL BCP-MCNC: 3.9 G/DL (ref 3.5–5.2)
ALP SERPL-CCNC: 82 U/L (ref 55–135)
ALT SERPL W/O P-5'-P-CCNC: 8 U/L (ref 10–44)
ANION GAP SERPL CALC-SCNC: 9 MMOL/L (ref 8–16)
AST SERPL-CCNC: 9 U/L (ref 10–40)
BACTERIA #/AREA URNS HPF: ABNORMAL /HPF
BASOPHILS # BLD AUTO: 0.04 K/UL (ref 0–0.2)
BASOPHILS NFR BLD: 0.4 % (ref 0–1.9)
BILIRUB SERPL-MCNC: 0.4 MG/DL (ref 0.1–1)
BILIRUB UR QL STRIP: NEGATIVE
BUN SERPL-MCNC: 23 MG/DL (ref 6–20)
CALCIUM SERPL-MCNC: 9.7 MG/DL (ref 8.7–10.5)
CHLORIDE SERPL-SCNC: 104 MMOL/L (ref 95–110)
CLARITY UR: ABNORMAL
CO2 SERPL-SCNC: 25 MMOL/L (ref 23–29)
COLOR UR: YELLOW
CREAT SERPL-MCNC: 3.2 MG/DL (ref 0.5–1.4)
CREAT UR-MCNC: 212 MG/DL (ref 15–325)
DIFFERENTIAL METHOD: ABNORMAL
EOSINOPHIL # BLD AUTO: 0.4 K/UL (ref 0–0.5)
EOSINOPHIL NFR BLD: 4.4 % (ref 0–8)
ERYTHROCYTE [DISTWIDTH] IN BLOOD BY AUTOMATED COUNT: 14.8 % (ref 11.5–14.5)
EST. GFR  (NO RACE VARIABLE): 19 ML/MIN/1.73 M^2
GLUCOSE SERPL-MCNC: 114 MG/DL (ref 70–110)
GLUCOSE UR QL STRIP: NEGATIVE
HCT VFR BLD AUTO: 36.1 % (ref 37–48.5)
HGB BLD-MCNC: 11.2 G/DL (ref 12–16)
HGB UR QL STRIP: NEGATIVE
HYALINE CASTS #/AREA URNS LPF: 0 /LPF
IMM GRANULOCYTES # BLD AUTO: 0.02 K/UL (ref 0–0.04)
IMM GRANULOCYTES NFR BLD AUTO: 0.2 % (ref 0–0.5)
KETONES UR QL STRIP: NEGATIVE
LEUKOCYTE ESTERASE UR QL STRIP: NEGATIVE
LYMPHOCYTES # BLD AUTO: 2.7 K/UL (ref 1–4.8)
LYMPHOCYTES NFR BLD: 27.4 % (ref 18–48)
MAGNESIUM SERPL-MCNC: 1.7 MG/DL (ref 1.6–2.6)
MCH RBC QN AUTO: 25.2 PG (ref 27–31)
MCHC RBC AUTO-ENTMCNC: 31 G/DL (ref 32–36)
MCV RBC AUTO: 81 FL (ref 82–98)
MICROSCOPIC COMMENT: ABNORMAL
MONOCYTES # BLD AUTO: 0.7 K/UL (ref 0.3–1)
MONOCYTES NFR BLD: 6.7 % (ref 4–15)
NEUTROPHILS # BLD AUTO: 6 K/UL (ref 1.8–7.7)
NEUTROPHILS NFR BLD: 60.9 % (ref 38–73)
NITRITE UR QL STRIP: NEGATIVE
NRBC BLD-RTO: 0 /100 WBC
PH UR STRIP: 6 [PH] (ref 5–8)
PHOSPHATE SERPL-MCNC: 3.6 MG/DL (ref 2.7–4.5)
PLATELET # BLD AUTO: 421 K/UL (ref 150–450)
PMV BLD AUTO: 10.1 FL (ref 9.2–12.9)
POTASSIUM SERPL-SCNC: 4.1 MMOL/L (ref 3.5–5.1)
PROT SERPL-MCNC: 8 G/DL (ref 6–8.4)
PROT UR QL STRIP: ABNORMAL
PROT UR-MCNC: 150 MG/DL
PROT/CREAT UR: 0.71 MG/G{CREAT} (ref 0–0.2)
RBC # BLD AUTO: 4.44 M/UL (ref 4–5.4)
RBC #/AREA URNS HPF: 4 /HPF (ref 0–4)
SODIUM SERPL-SCNC: 138 MMOL/L (ref 136–145)
SP GR UR STRIP: 1.01 (ref 1–1.03)
SQUAMOUS #/AREA URNS HPF: 55 /HPF
URN SPEC COLLECT METH UR: ABNORMAL
UROBILINOGEN UR STRIP-ACNC: NEGATIVE EU/DL
WBC # BLD AUTO: 9.85 K/UL (ref 3.9–12.7)
WBC #/AREA URNS HPF: 9 /HPF (ref 0–5)

## 2023-04-28 PROCEDURE — 82570 ASSAY OF URINE CREATININE: CPT | Performed by: INTERNAL MEDICINE

## 2023-04-28 PROCEDURE — 80053 COMPREHEN METABOLIC PANEL: CPT | Performed by: INTERNAL MEDICINE

## 2023-04-28 PROCEDURE — 81000 URINALYSIS NONAUTO W/SCOPE: CPT | Performed by: INTERNAL MEDICINE

## 2023-04-28 PROCEDURE — 85025 COMPLETE CBC W/AUTO DIFF WBC: CPT | Performed by: INTERNAL MEDICINE

## 2023-04-28 PROCEDURE — 83735 ASSAY OF MAGNESIUM: CPT | Performed by: INTERNAL MEDICINE

## 2023-04-28 PROCEDURE — 36415 COLL VENOUS BLD VENIPUNCTURE: CPT | Performed by: INTERNAL MEDICINE

## 2023-04-28 PROCEDURE — 84100 ASSAY OF PHOSPHORUS: CPT | Performed by: INTERNAL MEDICINE

## 2023-07-06 ENCOUNTER — LAB VISIT (OUTPATIENT)
Dept: LAB | Facility: HOSPITAL | Age: 33
End: 2023-07-06
Attending: INTERNAL MEDICINE
Payer: MEDICARE

## 2023-07-06 DIAGNOSIS — E79.0 HYPERURICEMIA: ICD-10-CM

## 2023-07-06 DIAGNOSIS — N18.4 ANEMIA OF CHRONIC RENAL FAILURE, STAGE 4 (SEVERE): ICD-10-CM

## 2023-07-06 DIAGNOSIS — E26.9 HYPERALDOSTERONISM, UNSPECIFIED: ICD-10-CM

## 2023-07-06 DIAGNOSIS — D63.1 ANEMIA OF CHRONIC RENAL FAILURE, STAGE 4 (SEVERE): ICD-10-CM

## 2023-07-06 DIAGNOSIS — R80.1 PERSISTENT PROTEINURIA: ICD-10-CM

## 2023-07-06 DIAGNOSIS — E55.9 VITAMIN D DEFICIENCY: ICD-10-CM

## 2023-07-06 DIAGNOSIS — D50.9 IRON DEFICIENCY ANEMIA, UNSPECIFIED IRON DEFICIENCY ANEMIA TYPE: ICD-10-CM

## 2023-07-06 DIAGNOSIS — I10 HYPERTENSION, UNSPECIFIED TYPE: ICD-10-CM

## 2023-07-06 DIAGNOSIS — N25.81 HYPERPARATHYROIDISM, SECONDARY: ICD-10-CM

## 2023-07-06 DIAGNOSIS — E87.5 HYPERKALEMIA: ICD-10-CM

## 2023-07-06 DIAGNOSIS — N18.4 CHRONIC KIDNEY DISEASE, STAGE IV (SEVERE): ICD-10-CM

## 2023-07-06 DIAGNOSIS — I10 HYPERTENSION, ESSENTIAL: ICD-10-CM

## 2023-07-06 LAB
ALBUMIN SERPL BCP-MCNC: 4.1 G/DL (ref 3.5–5.2)
ALP SERPL-CCNC: 90 U/L (ref 55–135)
ALT SERPL W/O P-5'-P-CCNC: 11 U/L (ref 10–44)
ANION GAP SERPL CALC-SCNC: 9 MMOL/L (ref 8–16)
ANION GAP SERPL CALC-SCNC: 9 MMOL/L (ref 8–16)
AST SERPL-CCNC: 11 U/L (ref 10–40)
BACTERIA #/AREA URNS HPF: ABNORMAL /HPF
BASOPHILS # BLD AUTO: 0.04 K/UL (ref 0–0.2)
BASOPHILS NFR BLD: 0.4 % (ref 0–1.9)
BILIRUB SERPL-MCNC: 0.3 MG/DL (ref 0.1–1)
BILIRUB UR QL STRIP: NEGATIVE
BUN SERPL-MCNC: 29 MG/DL (ref 6–20)
BUN SERPL-MCNC: 29 MG/DL (ref 6–20)
CALCIUM SERPL-MCNC: 10.2 MG/DL (ref 8.7–10.5)
CALCIUM SERPL-MCNC: 10.2 MG/DL (ref 8.7–10.5)
CHLORIDE SERPL-SCNC: 104 MMOL/L (ref 95–110)
CHLORIDE SERPL-SCNC: 104 MMOL/L (ref 95–110)
CLARITY UR: ABNORMAL
CO2 SERPL-SCNC: 23 MMOL/L (ref 23–29)
CO2 SERPL-SCNC: 23 MMOL/L (ref 23–29)
COLOR UR: YELLOW
CREAT SERPL-MCNC: 2.9 MG/DL (ref 0.5–1.4)
CREAT SERPL-MCNC: 2.9 MG/DL (ref 0.5–1.4)
CREAT UR-MCNC: 118.9 MG/DL (ref 15–325)
DIFFERENTIAL METHOD: ABNORMAL
EOSINOPHIL # BLD AUTO: 0.5 K/UL (ref 0–0.5)
EOSINOPHIL NFR BLD: 4.6 % (ref 0–8)
ERYTHROCYTE [DISTWIDTH] IN BLOOD BY AUTOMATED COUNT: 15.9 % (ref 11.5–14.5)
EST. GFR  (NO RACE VARIABLE): 21 ML/MIN/1.73 M^2
EST. GFR  (NO RACE VARIABLE): 21 ML/MIN/1.73 M^2
FERRITIN SERPL-MCNC: 20 NG/ML (ref 20–300)
GLUCOSE SERPL-MCNC: 90 MG/DL (ref 70–110)
GLUCOSE SERPL-MCNC: 90 MG/DL (ref 70–110)
GLUCOSE UR QL STRIP: NEGATIVE
GRAN CASTS #/AREA URNS LPF: 1 /LPF
HCT VFR BLD AUTO: 39.7 % (ref 37–48.5)
HGB BLD-MCNC: 12.1 G/DL (ref 12–16)
HGB UR QL STRIP: NEGATIVE
HYALINE CASTS #/AREA URNS LPF: 0 /LPF
IMM GRANULOCYTES # BLD AUTO: 0.03 K/UL (ref 0–0.04)
IMM GRANULOCYTES NFR BLD AUTO: 0.3 % (ref 0–0.5)
IRON SERPL-MCNC: 28 UG/DL (ref 30–160)
KETONES UR QL STRIP: NEGATIVE
LEUKOCYTE ESTERASE UR QL STRIP: NEGATIVE
LYMPHOCYTES # BLD AUTO: 2.2 K/UL (ref 1–4.8)
LYMPHOCYTES NFR BLD: 20.4 % (ref 18–48)
MAGNESIUM SERPL-MCNC: 2 MG/DL (ref 1.6–2.6)
MCH RBC QN AUTO: 25.2 PG (ref 27–31)
MCHC RBC AUTO-ENTMCNC: 30.5 G/DL (ref 32–36)
MCV RBC AUTO: 83 FL (ref 82–98)
MICROSCOPIC COMMENT: ABNORMAL
MONOCYTES # BLD AUTO: 0.7 K/UL (ref 0.3–1)
MONOCYTES NFR BLD: 6.6 % (ref 4–15)
NEUTROPHILS # BLD AUTO: 7.3 K/UL (ref 1.8–7.7)
NEUTROPHILS NFR BLD: 67.7 % (ref 38–73)
NITRITE UR QL STRIP: NEGATIVE
NRBC BLD-RTO: 0 /100 WBC
PH UR STRIP: 6 [PH] (ref 5–8)
PHOSPHATE SERPL-MCNC: 4.4 MG/DL (ref 2.7–4.5)
PLATELET # BLD AUTO: 460 K/UL (ref 150–450)
PMV BLD AUTO: 10.6 FL (ref 9.2–12.9)
POTASSIUM SERPL-SCNC: 4.8 MMOL/L (ref 3.5–5.1)
POTASSIUM SERPL-SCNC: 4.8 MMOL/L (ref 3.5–5.1)
PROT SERPL-MCNC: 8.7 G/DL (ref 6–8.4)
PROT UR QL STRIP: ABNORMAL
PROT UR-MCNC: 97 MG/DL
PROT/CREAT UR: 0.82 MG/G{CREAT} (ref 0–0.2)
PTH-INTACT SERPL-MCNC: 283.7 PG/ML (ref 9–77)
RBC # BLD AUTO: 4.8 M/UL (ref 4–5.4)
RBC #/AREA URNS HPF: 3 /HPF (ref 0–4)
SATURATED IRON: 9 % (ref 20–50)
SODIUM SERPL-SCNC: 136 MMOL/L (ref 136–145)
SODIUM SERPL-SCNC: 136 MMOL/L (ref 136–145)
SP GR UR STRIP: 1.01 (ref 1–1.03)
SQUAMOUS #/AREA URNS HPF: 12 /HPF
TOTAL IRON BINDING CAPACITY: 329 UG/DL (ref 250–450)
TRANSFERRIN SERPL-MCNC: 222 MG/DL (ref 200–375)
URATE SERPL-MCNC: 8.7 MG/DL (ref 2.4–5.7)
URN SPEC COLLECT METH UR: ABNORMAL
UROBILINOGEN UR STRIP-ACNC: NEGATIVE EU/DL
WBC # BLD AUTO: 10.78 K/UL (ref 3.9–12.7)
WBC #/AREA URNS HPF: 5 /HPF (ref 0–5)

## 2023-07-06 PROCEDURE — 36415 COLL VENOUS BLD VENIPUNCTURE: CPT | Performed by: INTERNAL MEDICINE

## 2023-07-06 PROCEDURE — 85025 COMPLETE CBC W/AUTO DIFF WBC: CPT | Performed by: INTERNAL MEDICINE

## 2023-07-06 PROCEDURE — 80053 COMPREHEN METABOLIC PANEL: CPT | Performed by: INTERNAL MEDICINE

## 2023-07-06 PROCEDURE — 84550 ASSAY OF BLOOD/URIC ACID: CPT | Performed by: INTERNAL MEDICINE

## 2023-07-06 PROCEDURE — 81000 URINALYSIS NONAUTO W/SCOPE: CPT | Performed by: INTERNAL MEDICINE

## 2023-07-06 PROCEDURE — 84466 ASSAY OF TRANSFERRIN: CPT | Performed by: INTERNAL MEDICINE

## 2023-07-06 PROCEDURE — 84100 ASSAY OF PHOSPHORUS: CPT | Performed by: INTERNAL MEDICINE

## 2023-07-06 PROCEDURE — 82728 ASSAY OF FERRITIN: CPT | Performed by: INTERNAL MEDICINE

## 2023-07-06 PROCEDURE — 83970 ASSAY OF PARATHORMONE: CPT | Performed by: INTERNAL MEDICINE

## 2023-07-06 PROCEDURE — 84156 ASSAY OF PROTEIN URINE: CPT | Performed by: INTERNAL MEDICINE

## 2023-07-06 PROCEDURE — 83735 ASSAY OF MAGNESIUM: CPT | Performed by: INTERNAL MEDICINE

## 2023-09-25 ENCOUNTER — LAB VISIT (OUTPATIENT)
Dept: LAB | Facility: HOSPITAL | Age: 33
End: 2023-09-25
Attending: INTERNAL MEDICINE
Payer: MEDICARE

## 2023-09-25 DIAGNOSIS — E87.5 HYPERKALEMIA: ICD-10-CM

## 2023-09-25 DIAGNOSIS — R80.1 PERSISTENT PROTEINURIA: ICD-10-CM

## 2023-09-25 DIAGNOSIS — N25.81 SECONDARY HYPERPARATHYROIDISM: ICD-10-CM

## 2023-09-25 DIAGNOSIS — N18.4 ANEMIA OF CHRONIC RENAL FAILURE, STAGE 4 (SEVERE): ICD-10-CM

## 2023-09-25 DIAGNOSIS — I10 HYPERTENSION, ESSENTIAL: ICD-10-CM

## 2023-09-25 DIAGNOSIS — N18.4 CHRONIC KIDNEY DISEASE, STAGE IV (SEVERE): ICD-10-CM

## 2023-09-25 DIAGNOSIS — D63.1 ANEMIA OF CHRONIC RENAL FAILURE, STAGE 4 (SEVERE): ICD-10-CM

## 2023-09-25 DIAGNOSIS — E55.9 VITAMIN D DEFICIENCY: ICD-10-CM

## 2023-09-25 DIAGNOSIS — D50.9 IRON DEFICIENCY ANEMIA, UNSPECIFIED IRON DEFICIENCY ANEMIA TYPE: ICD-10-CM

## 2023-09-25 DIAGNOSIS — E79.0 HYPERURICEMIA: ICD-10-CM

## 2023-09-25 LAB
ALBUMIN SERPL BCP-MCNC: 3.7 G/DL (ref 3.5–5.2)
ALP SERPL-CCNC: 73 U/L (ref 55–135)
ALT SERPL W/O P-5'-P-CCNC: 9 U/L (ref 10–44)
ANION GAP SERPL CALC-SCNC: 10 MMOL/L (ref 8–16)
ANION GAP SERPL CALC-SCNC: 10 MMOL/L (ref 8–16)
AST SERPL-CCNC: 13 U/L (ref 10–40)
BACTERIA #/AREA URNS HPF: NORMAL /HPF
BASOPHILS # BLD AUTO: 0.03 K/UL (ref 0–0.2)
BASOPHILS NFR BLD: 0.3 % (ref 0–1.9)
BILIRUB SERPL-MCNC: 0.4 MG/DL (ref 0.1–1)
BILIRUB UR QL STRIP: NEGATIVE
BUN SERPL-MCNC: 30 MG/DL (ref 6–20)
BUN SERPL-MCNC: 30 MG/DL (ref 6–20)
CALCIUM SERPL-MCNC: 9.2 MG/DL (ref 8.7–10.5)
CALCIUM SERPL-MCNC: 9.2 MG/DL (ref 8.7–10.5)
CHLORIDE SERPL-SCNC: 108 MMOL/L (ref 95–110)
CHLORIDE SERPL-SCNC: 108 MMOL/L (ref 95–110)
CLARITY UR: CLEAR
CO2 SERPL-SCNC: 21 MMOL/L (ref 23–29)
CO2 SERPL-SCNC: 21 MMOL/L (ref 23–29)
COLOR UR: YELLOW
CREAT SERPL-MCNC: 3 MG/DL (ref 0.5–1.4)
CREAT SERPL-MCNC: 3 MG/DL (ref 0.5–1.4)
CREAT UR-MCNC: 224.6 MG/DL (ref 15–325)
DIFFERENTIAL METHOD: ABNORMAL
EOSINOPHIL # BLD AUTO: 0.5 K/UL (ref 0–0.5)
EOSINOPHIL NFR BLD: 4.8 % (ref 0–8)
ERYTHROCYTE [DISTWIDTH] IN BLOOD BY AUTOMATED COUNT: 15.1 % (ref 11.5–14.5)
EST. GFR  (NO RACE VARIABLE): 20 ML/MIN/1.73 M^2
EST. GFR  (NO RACE VARIABLE): 20 ML/MIN/1.73 M^2
GLUCOSE SERPL-MCNC: 110 MG/DL (ref 70–110)
GLUCOSE SERPL-MCNC: 110 MG/DL (ref 70–110)
GLUCOSE UR QL STRIP: NEGATIVE
HCT VFR BLD AUTO: 35.3 % (ref 37–48.5)
HGB BLD-MCNC: 10.8 G/DL (ref 12–16)
HGB UR QL STRIP: ABNORMAL
HYALINE CASTS #/AREA URNS LPF: 0 /LPF
IMM GRANULOCYTES # BLD AUTO: 0.02 K/UL (ref 0–0.04)
IMM GRANULOCYTES NFR BLD AUTO: 0.2 % (ref 0–0.5)
KETONES UR QL STRIP: NEGATIVE
LEUKOCYTE ESTERASE UR QL STRIP: NEGATIVE
LYMPHOCYTES # BLD AUTO: 2.3 K/UL (ref 1–4.8)
LYMPHOCYTES NFR BLD: 25.1 % (ref 18–48)
MAGNESIUM SERPL-MCNC: 1.9 MG/DL (ref 1.6–2.6)
MCH RBC QN AUTO: 26.2 PG (ref 27–31)
MCHC RBC AUTO-ENTMCNC: 30.6 G/DL (ref 32–36)
MCV RBC AUTO: 86 FL (ref 82–98)
MICROSCOPIC COMMENT: NORMAL
MONOCYTES # BLD AUTO: 0.7 K/UL (ref 0.3–1)
MONOCYTES NFR BLD: 7.5 % (ref 4–15)
NEUTROPHILS # BLD AUTO: 5.8 K/UL (ref 1.8–7.7)
NEUTROPHILS NFR BLD: 62.1 % (ref 38–73)
NITRITE UR QL STRIP: NEGATIVE
NRBC BLD-RTO: 0 /100 WBC
PH UR STRIP: 6 [PH] (ref 5–8)
PHOSPHATE SERPL-MCNC: 3.9 MG/DL (ref 2.7–4.5)
PLATELET # BLD AUTO: 375 K/UL (ref 150–450)
PMV BLD AUTO: 10.8 FL (ref 9.2–12.9)
POTASSIUM SERPL-SCNC: 4.2 MMOL/L (ref 3.5–5.1)
POTASSIUM SERPL-SCNC: 4.2 MMOL/L (ref 3.5–5.1)
PROT SERPL-MCNC: 7.9 G/DL (ref 6–8.4)
PROT UR QL STRIP: ABNORMAL
PROT UR-MCNC: 281 MG/DL
PROT/CREAT UR: 1.25 MG/G{CREAT} (ref 0–0.2)
PTH-INTACT SERPL-MCNC: 339.4 PG/ML (ref 9–77)
RBC # BLD AUTO: 4.12 M/UL (ref 4–5.4)
RBC #/AREA URNS HPF: 2 /HPF (ref 0–4)
SODIUM SERPL-SCNC: 139 MMOL/L (ref 136–145)
SODIUM SERPL-SCNC: 139 MMOL/L (ref 136–145)
SP GR UR STRIP: 1.02 (ref 1–1.03)
SQUAMOUS #/AREA URNS HPF: 3 /HPF
URATE SERPL-MCNC: 9.6 MG/DL (ref 2.4–5.7)
URN SPEC COLLECT METH UR: ABNORMAL
UROBILINOGEN UR STRIP-ACNC: NEGATIVE EU/DL
WBC # BLD AUTO: 9.33 K/UL (ref 3.9–12.7)
WBC #/AREA URNS HPF: 1 /HPF (ref 0–5)

## 2023-09-25 PROCEDURE — 80053 COMPREHEN METABOLIC PANEL: CPT | Performed by: INTERNAL MEDICINE

## 2023-09-25 PROCEDURE — 36415 COLL VENOUS BLD VENIPUNCTURE: CPT | Performed by: INTERNAL MEDICINE

## 2023-09-25 PROCEDURE — 84550 ASSAY OF BLOOD/URIC ACID: CPT | Performed by: INTERNAL MEDICINE

## 2023-09-25 PROCEDURE — 85025 COMPLETE CBC W/AUTO DIFF WBC: CPT | Performed by: INTERNAL MEDICINE

## 2023-09-25 PROCEDURE — 84100 ASSAY OF PHOSPHORUS: CPT | Performed by: INTERNAL MEDICINE

## 2023-09-25 PROCEDURE — 84156 ASSAY OF PROTEIN URINE: CPT | Performed by: INTERNAL MEDICINE

## 2023-09-25 PROCEDURE — 83970 ASSAY OF PARATHORMONE: CPT | Performed by: INTERNAL MEDICINE

## 2023-09-25 PROCEDURE — 83735 ASSAY OF MAGNESIUM: CPT | Performed by: INTERNAL MEDICINE

## 2023-09-25 PROCEDURE — 81000 URINALYSIS NONAUTO W/SCOPE: CPT | Performed by: INTERNAL MEDICINE

## 2023-10-30 NOTE — PLAN OF CARE
Problem: Patient Care Overview  Goal: Plan of Care Review  Outcome: Ongoing (interventions implemented as appropriate)  Returned to unit PP C/S POD#1 w/ LTVS incision dressing intact w/ no drainage. Fundus firm w/ light lochia. Magnesium gtt; UOP adequate--farias catheter in place. VSS since delivery, no severe range pressures post-OP. Breast pump at bedside, pumping encouraged Q3-4 hours.        severe

## 2023-11-16 ENCOUNTER — LAB VISIT (OUTPATIENT)
Dept: LAB | Facility: HOSPITAL | Age: 33
End: 2023-11-16
Payer: MEDICARE

## 2023-11-16 DIAGNOSIS — I10 HYPERTENSION, ESSENTIAL: ICD-10-CM

## 2023-11-16 DIAGNOSIS — N25.81 SECONDARY HYPERPARATHYROIDISM: ICD-10-CM

## 2023-11-16 DIAGNOSIS — E87.5 HYPERKALEMIA: ICD-10-CM

## 2023-11-16 DIAGNOSIS — D63.1 ANEMIA OF CHRONIC RENAL FAILURE, STAGE 4 (SEVERE): ICD-10-CM

## 2023-11-16 DIAGNOSIS — N18.4 CHRONIC KIDNEY DISEASE, STAGE IV (SEVERE): ICD-10-CM

## 2023-11-16 DIAGNOSIS — E55.9 VITAMIN D DEFICIENCY: ICD-10-CM

## 2023-11-16 DIAGNOSIS — D50.9 IRON DEFICIENCY ANEMIA, UNSPECIFIED IRON DEFICIENCY ANEMIA TYPE: ICD-10-CM

## 2023-11-16 DIAGNOSIS — E79.0 HYPERURICEMIA: ICD-10-CM

## 2023-11-16 DIAGNOSIS — N18.4 ANEMIA OF CHRONIC RENAL FAILURE, STAGE 4 (SEVERE): ICD-10-CM

## 2023-11-16 DIAGNOSIS — E26.9 HYPERALDOSTERONISM, UNSPECIFIED: ICD-10-CM

## 2023-11-16 DIAGNOSIS — R80.1 PERSISTENT PROTEINURIA: ICD-10-CM

## 2023-11-16 LAB
ALBUMIN SERPL BCP-MCNC: 4 G/DL (ref 3.5–5.2)
ALP SERPL-CCNC: 80 U/L (ref 55–135)
ALT SERPL W/O P-5'-P-CCNC: 13 U/L (ref 10–44)
ANION GAP SERPL CALC-SCNC: 10 MMOL/L (ref 8–16)
AST SERPL-CCNC: 15 U/L (ref 10–40)
BACTERIA #/AREA URNS HPF: ABNORMAL /HPF
BASOPHILS # BLD AUTO: 0.04 K/UL (ref 0–0.2)
BASOPHILS NFR BLD: 0.4 % (ref 0–1.9)
BILIRUB SERPL-MCNC: 0.2 MG/DL (ref 0.1–1)
BILIRUB UR QL STRIP: NEGATIVE
BUN SERPL-MCNC: 19 MG/DL (ref 6–20)
CALCIUM SERPL-MCNC: 9.4 MG/DL (ref 8.7–10.5)
CHLORIDE SERPL-SCNC: 106 MMOL/L (ref 95–110)
CLARITY UR: CLEAR
CO2 SERPL-SCNC: 21 MMOL/L (ref 23–29)
COLOR UR: YELLOW
CREAT SERPL-MCNC: 2.9 MG/DL (ref 0.5–1.4)
CREAT UR-MCNC: 296.4 MG/DL (ref 15–325)
DIFFERENTIAL METHOD: ABNORMAL
EOSINOPHIL # BLD AUTO: 0.4 K/UL (ref 0–0.5)
EOSINOPHIL NFR BLD: 3.8 % (ref 0–8)
ERYTHROCYTE [DISTWIDTH] IN BLOOD BY AUTOMATED COUNT: 14.4 % (ref 11.5–14.5)
EST. GFR  (NO RACE VARIABLE): 21 ML/MIN/1.73 M^2
GLUCOSE SERPL-MCNC: 136 MG/DL (ref 70–110)
GLUCOSE UR QL STRIP: NEGATIVE
HCT VFR BLD AUTO: 36.4 % (ref 37–48.5)
HGB BLD-MCNC: 11.4 G/DL (ref 12–16)
HGB UR QL STRIP: ABNORMAL
HYALINE CASTS #/AREA URNS LPF: 1 /LPF
IMM GRANULOCYTES # BLD AUTO: 0.04 K/UL (ref 0–0.04)
IMM GRANULOCYTES NFR BLD AUTO: 0.4 % (ref 0–0.5)
KETONES UR QL STRIP: NEGATIVE
LEUKOCYTE ESTERASE UR QL STRIP: NEGATIVE
LYMPHOCYTES # BLD AUTO: 1.9 K/UL (ref 1–4.8)
LYMPHOCYTES NFR BLD: 20.5 % (ref 18–48)
MAGNESIUM SERPL-MCNC: 2 MG/DL (ref 1.6–2.6)
MCH RBC QN AUTO: 26 PG (ref 27–31)
MCHC RBC AUTO-ENTMCNC: 31.3 G/DL (ref 32–36)
MCV RBC AUTO: 83 FL (ref 82–98)
MICROSCOPIC COMMENT: ABNORMAL
MONOCYTES # BLD AUTO: 0.5 K/UL (ref 0.3–1)
MONOCYTES NFR BLD: 4.9 % (ref 4–15)
NEUTROPHILS # BLD AUTO: 6.4 K/UL (ref 1.8–7.7)
NEUTROPHILS NFR BLD: 70 % (ref 38–73)
NITRITE UR QL STRIP: NEGATIVE
NRBC BLD-RTO: 0 /100 WBC
PH UR STRIP: 6 [PH] (ref 5–8)
PHOSPHATE SERPL-MCNC: 3.3 MG/DL (ref 2.7–4.5)
PLATELET # BLD AUTO: 346 K/UL (ref 150–450)
PMV BLD AUTO: 11.1 FL (ref 9.2–12.9)
POTASSIUM SERPL-SCNC: 4.3 MMOL/L (ref 3.5–5.1)
PROT SERPL-MCNC: 8.3 G/DL (ref 6–8.4)
PROT UR QL STRIP: ABNORMAL
PROT UR-MCNC: 790 MG/DL
PROT/CREAT UR: 2.67 MG/G{CREAT} (ref 0–0.2)
PTH-INTACT SERPL-MCNC: 492.1 PG/ML (ref 9–77)
RBC # BLD AUTO: 4.39 M/UL (ref 4–5.4)
RBC #/AREA URNS HPF: 10 /HPF (ref 0–4)
SODIUM SERPL-SCNC: 137 MMOL/L (ref 136–145)
SP GR UR STRIP: 1.02 (ref 1–1.03)
URATE SERPL-MCNC: 9.1 MG/DL (ref 2.4–5.7)
URN SPEC COLLECT METH UR: ABNORMAL
UROBILINOGEN UR STRIP-ACNC: NEGATIVE EU/DL
WBC # BLD AUTO: 9.16 K/UL (ref 3.9–12.7)
WBC #/AREA URNS HPF: 2 /HPF (ref 0–5)

## 2023-11-16 PROCEDURE — 84156 ASSAY OF PROTEIN URINE: CPT

## 2023-11-16 PROCEDURE — 83970 ASSAY OF PARATHORMONE: CPT

## 2023-11-16 PROCEDURE — 80053 COMPREHEN METABOLIC PANEL: CPT

## 2023-11-16 PROCEDURE — 36415 COLL VENOUS BLD VENIPUNCTURE: CPT

## 2023-11-16 PROCEDURE — 81000 URINALYSIS NONAUTO W/SCOPE: CPT

## 2023-11-16 PROCEDURE — 85025 COMPLETE CBC W/AUTO DIFF WBC: CPT

## 2023-11-16 PROCEDURE — 83735 ASSAY OF MAGNESIUM: CPT

## 2023-11-16 PROCEDURE — 84100 ASSAY OF PHOSPHORUS: CPT

## 2023-11-16 PROCEDURE — 84550 ASSAY OF BLOOD/URIC ACID: CPT

## 2023-12-13 NOTE — TRANSFER OF CARE
"Anesthesia Transfer of Care Note    Patient: Kaylie Fuentes    Procedure(s) Performed: Procedure(s) (LRB):  DELIVERY- SECTION (N/A)    Patient location: PACU    Anesthesia Type: CSE    Transport from OR: Transported from OR on room air with adequate spontaneous ventilation    Post pain: adequate analgesia    Post assessment: no apparent anesthetic complications    Post vital signs: stable    Level of consciousness: awake, alert and oriented    Nausea/Vomiting: no nausea/vomiting    Complications: none    Transfer of care protocol was followed      Last vitals:   Visit Vitals  BP (!) 149/95   Pulse 95   Temp 36.6 °C (97.9 °F)   Resp 16   Ht 5' 10" (1.778 m)   Wt (!) 138.8 kg (306 lb)   LMP 2017   SpO2 97%   Breastfeeding? No   BMI 43.91 kg/m²     "
Attending Only

## 2024-02-16 ENCOUNTER — LAB VISIT (OUTPATIENT)
Dept: LAB | Facility: HOSPITAL | Age: 34
End: 2024-02-16
Attending: INTERNAL MEDICINE
Payer: MEDICARE

## 2024-02-16 DIAGNOSIS — E55.9 VITAMIN D DEFICIENCY: ICD-10-CM

## 2024-02-16 DIAGNOSIS — N18.4 CHRONIC KIDNEY DISEASE, STAGE IV (SEVERE): ICD-10-CM

## 2024-02-16 DIAGNOSIS — D63.1 ANEMIA OF CHRONIC RENAL FAILURE, STAGE 4 (SEVERE): ICD-10-CM

## 2024-02-16 DIAGNOSIS — N18.4 ANEMIA OF CHRONIC RENAL FAILURE, STAGE 4 (SEVERE): ICD-10-CM

## 2024-02-16 DIAGNOSIS — I10 HYPERTENSION, ESSENTIAL: ICD-10-CM

## 2024-02-16 DIAGNOSIS — E79.0 HYPERURICEMIA: ICD-10-CM

## 2024-02-16 DIAGNOSIS — E26.9 HYPERALDOSTERONISM, UNSPECIFIED: ICD-10-CM

## 2024-02-16 DIAGNOSIS — R80.1 PERSISTENT PROTEINURIA: ICD-10-CM

## 2024-02-16 DIAGNOSIS — D50.9 IRON DEFICIENCY ANEMIA, UNSPECIFIED IRON DEFICIENCY ANEMIA TYPE: ICD-10-CM

## 2024-02-16 DIAGNOSIS — E87.5 HYPERKALEMIA: ICD-10-CM

## 2024-02-16 DIAGNOSIS — N25.81 SECONDARY HYPERPARATHYROIDISM: ICD-10-CM

## 2024-02-16 DIAGNOSIS — R10.9 RIGHT FLANK PAIN: ICD-10-CM

## 2024-02-16 DIAGNOSIS — R60.0 LOCALIZED EDEMA: ICD-10-CM

## 2024-02-16 LAB
ALBUMIN SERPL BCP-MCNC: 3.9 G/DL (ref 3.5–5.2)
ALP SERPL-CCNC: 79 U/L (ref 55–135)
ALT SERPL W/O P-5'-P-CCNC: 8 U/L (ref 10–44)
ANION GAP SERPL CALC-SCNC: 8 MMOL/L (ref 8–16)
AST SERPL-CCNC: 11 U/L (ref 10–40)
BACTERIA #/AREA URNS HPF: ABNORMAL /HPF
BASOPHILS # BLD AUTO: 0.06 K/UL (ref 0–0.2)
BASOPHILS NFR BLD: 0.5 % (ref 0–1.9)
BILIRUB SERPL-MCNC: 0.4 MG/DL (ref 0.1–1)
BILIRUB UR QL STRIP: NEGATIVE
BUN SERPL-MCNC: 22 MG/DL (ref 6–20)
CALCIUM SERPL-MCNC: 9.4 MG/DL (ref 8.7–10.5)
CHLORIDE SERPL-SCNC: 102 MMOL/L (ref 95–110)
CLARITY UR: CLEAR
CO2 SERPL-SCNC: 26 MMOL/L (ref 23–29)
COLOR UR: YELLOW
CREAT SERPL-MCNC: 3.3 MG/DL (ref 0.5–1.4)
CREAT UR-MCNC: 249.9 MG/DL (ref 15–325)
DIFFERENTIAL METHOD BLD: ABNORMAL
EOSINOPHIL # BLD AUTO: 0.4 K/UL (ref 0–0.5)
EOSINOPHIL NFR BLD: 2.9 % (ref 0–8)
ERYTHROCYTE [DISTWIDTH] IN BLOOD BY AUTOMATED COUNT: 15 % (ref 11.5–14.5)
EST. GFR  (NO RACE VARIABLE): 18 ML/MIN/1.73 M^2
GLUCOSE SERPL-MCNC: 84 MG/DL (ref 70–110)
GLUCOSE UR QL STRIP: NEGATIVE
HCT VFR BLD AUTO: 38.2 % (ref 37–48.5)
HGB BLD-MCNC: 11.7 G/DL (ref 12–16)
HGB UR QL STRIP: NEGATIVE
HYALINE CASTS #/AREA URNS LPF: 0 /LPF
IMM GRANULOCYTES # BLD AUTO: 0.03 K/UL (ref 0–0.04)
IMM GRANULOCYTES NFR BLD AUTO: 0.2 % (ref 0–0.5)
KETONES UR QL STRIP: NEGATIVE
LEUKOCYTE ESTERASE UR QL STRIP: ABNORMAL
LYMPHOCYTES # BLD AUTO: 3.1 K/UL (ref 1–4.8)
LYMPHOCYTES NFR BLD: 25.3 % (ref 18–48)
MAGNESIUM SERPL-MCNC: 2 MG/DL (ref 1.6–2.6)
MCH RBC QN AUTO: 26.1 PG (ref 27–31)
MCHC RBC AUTO-ENTMCNC: 30.6 G/DL (ref 32–36)
MCV RBC AUTO: 85 FL (ref 82–98)
MICROSCOPIC COMMENT: ABNORMAL
MONOCYTES # BLD AUTO: 0.9 K/UL (ref 0.3–1)
MONOCYTES NFR BLD: 7.7 % (ref 4–15)
NEUTROPHILS # BLD AUTO: 7.7 K/UL (ref 1.8–7.7)
NEUTROPHILS NFR BLD: 63.4 % (ref 38–73)
NITRITE UR QL STRIP: NEGATIVE
NRBC BLD-RTO: 0 /100 WBC
PH UR STRIP: 7 [PH] (ref 5–8)
PHOSPHATE SERPL-MCNC: 4 MG/DL (ref 2.7–4.5)
PLATELET # BLD AUTO: 380 K/UL (ref 150–450)
PMV BLD AUTO: 11.1 FL (ref 9.2–12.9)
POTASSIUM SERPL-SCNC: 4.3 MMOL/L (ref 3.5–5.1)
PROT SERPL-MCNC: 8.4 G/DL (ref 6–8.4)
PROT UR QL STRIP: ABNORMAL
PROT UR-MCNC: 200 MG/DL
PROT/CREAT UR: 0.8 MG/G{CREAT} (ref 0–0.2)
PTH-INTACT SERPL-MCNC: 347.7 PG/ML (ref 9–77)
RBC # BLD AUTO: 4.49 M/UL (ref 4–5.4)
RBC #/AREA URNS HPF: 13 /HPF (ref 0–4)
SODIUM SERPL-SCNC: 136 MMOL/L (ref 136–145)
SP GR UR STRIP: 1.01 (ref 1–1.03)
SQUAMOUS #/AREA URNS HPF: 1 /HPF
URATE SERPL-MCNC: 9.2 MG/DL (ref 2.4–5.7)
URN SPEC COLLECT METH UR: ABNORMAL
UROBILINOGEN UR STRIP-ACNC: NEGATIVE EU/DL
WBC # BLD AUTO: 12.09 K/UL (ref 3.9–12.7)
WBC #/AREA URNS HPF: 58 /HPF (ref 0–5)
WBC CLUMPS URNS QL MICRO: ABNORMAL

## 2024-02-16 PROCEDURE — 87077 CULTURE AEROBIC IDENTIFY: CPT | Performed by: INTERNAL MEDICINE

## 2024-02-16 PROCEDURE — 84100 ASSAY OF PHOSPHORUS: CPT | Performed by: INTERNAL MEDICINE

## 2024-02-16 PROCEDURE — 83970 ASSAY OF PARATHORMONE: CPT | Performed by: INTERNAL MEDICINE

## 2024-02-16 PROCEDURE — 83735 ASSAY OF MAGNESIUM: CPT | Performed by: INTERNAL MEDICINE

## 2024-02-16 PROCEDURE — 87086 URINE CULTURE/COLONY COUNT: CPT | Performed by: INTERNAL MEDICINE

## 2024-02-16 PROCEDURE — 87088 URINE BACTERIA CULTURE: CPT | Performed by: INTERNAL MEDICINE

## 2024-02-16 PROCEDURE — 80053 COMPREHEN METABOLIC PANEL: CPT | Performed by: INTERNAL MEDICINE

## 2024-02-16 PROCEDURE — 81000 URINALYSIS NONAUTO W/SCOPE: CPT | Performed by: INTERNAL MEDICINE

## 2024-02-16 PROCEDURE — 87186 SC STD MICRODIL/AGAR DIL: CPT | Performed by: INTERNAL MEDICINE

## 2024-02-16 PROCEDURE — 82570 ASSAY OF URINE CREATININE: CPT | Performed by: INTERNAL MEDICINE

## 2024-02-16 PROCEDURE — 36415 COLL VENOUS BLD VENIPUNCTURE: CPT | Performed by: INTERNAL MEDICINE

## 2024-02-16 PROCEDURE — 84550 ASSAY OF BLOOD/URIC ACID: CPT | Performed by: INTERNAL MEDICINE

## 2024-02-16 PROCEDURE — 85025 COMPLETE CBC W/AUTO DIFF WBC: CPT | Performed by: INTERNAL MEDICINE

## 2024-02-18 LAB — BACTERIA UR CULT: ABNORMAL

## 2024-02-21 PROBLEM — N25.81 SECONDARY HYPERPARATHYROIDISM: Status: ACTIVE | Noted: 2024-02-21

## 2024-02-21 PROBLEM — E79.0 HYPERURICEMIA: Status: ACTIVE | Noted: 2024-02-21

## 2024-02-21 PROBLEM — N39.0 UTI (URINARY TRACT INFECTION), UNCOMPLICATED: Status: ACTIVE | Noted: 2024-02-21

## 2024-02-21 PROBLEM — E55.9 VITAMIN D DEFICIENCY: Status: ACTIVE | Noted: 2024-02-21

## 2024-02-21 PROBLEM — D50.9 IRON DEFICIENCY ANEMIA: Status: ACTIVE | Noted: 2024-02-21

## 2024-02-21 PROBLEM — R80.1 PERSISTENT PROTEINURIA: Status: ACTIVE | Noted: 2024-02-21

## 2024-02-21 PROBLEM — E87.5 HYPERKALEMIA: Status: ACTIVE | Noted: 2024-02-21

## 2024-02-21 PROBLEM — I10 HYPERTENSION, ESSENTIAL: Status: ACTIVE | Noted: 2024-02-21

## 2024-02-21 PROBLEM — N18.4 CHRONIC KIDNEY DISEASE, STAGE IV (SEVERE): Status: ACTIVE | Noted: 2024-02-21

## 2024-02-21 PROBLEM — R10.9 RIGHT FLANK PAIN: Status: ACTIVE | Noted: 2024-02-21

## 2024-02-21 PROBLEM — R60.0 LOCALIZED EDEMA: Status: ACTIVE | Noted: 2024-02-21

## 2024-03-14 ENCOUNTER — HOSPITAL ENCOUNTER (OUTPATIENT)
Dept: RADIOLOGY | Facility: HOSPITAL | Age: 34
Discharge: HOME | End: 2024-03-14
Payer: MEDICARE

## 2024-03-14 DIAGNOSIS — N18.4 CHRONIC KIDNEY DISEASE, STAGE IV (SEVERE): ICD-10-CM

## 2024-03-14 PROCEDURE — 76770 US EXAM ABDO BACK WALL COMP: CPT | Mod: TC

## 2024-03-14 PROCEDURE — 76770 US EXAM ABDO BACK WALL COMP: CPT | Mod: 26,,, | Performed by: STUDENT IN AN ORGANIZED HEALTH CARE EDUCATION/TRAINING PROGRAM

## 2024-03-25 ENCOUNTER — HOSPITAL ENCOUNTER (OUTPATIENT)
Dept: RADIOLOGY | Facility: HOSPITAL | Age: 34
Discharge: HOME | End: 2024-03-25
Payer: MEDICARE

## 2024-03-25 DIAGNOSIS — R10.9 ABDOMINAL PAIN, UNSPECIFIED ABDOMINAL LOCATION: ICD-10-CM

## 2024-03-25 DIAGNOSIS — E26.9 HYPERALDOSTERONISM, UNSPECIFIED: ICD-10-CM

## 2024-03-25 PROCEDURE — 74176 CT ABD & PELVIS W/O CONTRAST: CPT | Mod: 26,,, | Performed by: STUDENT IN AN ORGANIZED HEALTH CARE EDUCATION/TRAINING PROGRAM

## 2024-03-25 PROCEDURE — 74176 CT ABD & PELVIS W/O CONTRAST: CPT | Mod: TC

## 2024-04-18 ENCOUNTER — OFFICE VISIT (OUTPATIENT)
Dept: FAMILY MEDICINE | Facility: CLINIC | Age: 34
End: 2024-04-18
Payer: MEDICARE

## 2024-04-18 VITALS
HEIGHT: 70 IN | DIASTOLIC BLOOD PRESSURE: 86 MMHG | WEIGHT: 293 LBS | BODY MASS INDEX: 41.95 KG/M2 | OXYGEN SATURATION: 99 % | HEART RATE: 76 BPM | TEMPERATURE: 98 F | SYSTOLIC BLOOD PRESSURE: 112 MMHG

## 2024-04-18 DIAGNOSIS — I13.0 HYPERTENSIVE HEART AND CHRONIC KIDNEY DISEASE WITH HEART FAILURE AND STAGE 1 THROUGH STAGE 4 CHRONIC KIDNEY DISEASE, OR UNSPECIFIED CHRONIC KIDNEY DISEASE: ICD-10-CM

## 2024-04-18 DIAGNOSIS — R00.2 PALPITATIONS: ICD-10-CM

## 2024-04-18 DIAGNOSIS — L72.9 GENERALIZED SKIN CYSTS: ICD-10-CM

## 2024-04-18 DIAGNOSIS — E66.01 CLASS 3 SEVERE OBESITY DUE TO EXCESS CALORIES WITH SERIOUS COMORBIDITY AND BODY MASS INDEX (BMI) OF 40.0 TO 44.9 IN ADULT: ICD-10-CM

## 2024-04-18 DIAGNOSIS — Z00.00 ENCOUNTER FOR MEDICAL EXAMINATION TO ESTABLISH CARE: Primary | ICD-10-CM

## 2024-04-18 DIAGNOSIS — I15.8 OTHER SECONDARY HYPERTENSION: ICD-10-CM

## 2024-04-18 DIAGNOSIS — H54.3 BLINDNESS OF BOTH EYES: ICD-10-CM

## 2024-04-18 PROBLEM — E66.812 CLASS 2 SEVERE OBESITY DUE TO EXCESS CALORIES WITH SERIOUS COMORBIDITY AND BODY MASS INDEX (BMI) OF 38.0 TO 38.9 IN ADULT: Chronic | Status: RESOLVED | Noted: 2019-09-13 | Resolved: 2024-04-18

## 2024-04-18 PROCEDURE — 3079F DIAST BP 80-89 MM HG: CPT | Mod: CPTII,S$GLB,, | Performed by: FAMILY MEDICINE

## 2024-04-18 PROCEDURE — 1160F RVW MEDS BY RX/DR IN RCRD: CPT | Mod: CPTII,S$GLB,, | Performed by: FAMILY MEDICINE

## 2024-04-18 PROCEDURE — 3008F BODY MASS INDEX DOCD: CPT | Mod: CPTII,S$GLB,, | Performed by: FAMILY MEDICINE

## 2024-04-18 PROCEDURE — 99999 PR PBB SHADOW E&M-EST. PATIENT-LVL V: CPT | Mod: PBBFAC,,, | Performed by: FAMILY MEDICINE

## 2024-04-18 PROCEDURE — 1159F MED LIST DOCD IN RCRD: CPT | Mod: CPTII,S$GLB,, | Performed by: FAMILY MEDICINE

## 2024-04-18 PROCEDURE — 4010F ACE/ARB THERAPY RXD/TAKEN: CPT | Mod: CPTII,S$GLB,, | Performed by: FAMILY MEDICINE

## 2024-04-18 PROCEDURE — 3066F NEPHROPATHY DOC TX: CPT | Mod: CPTII,S$GLB,, | Performed by: FAMILY MEDICINE

## 2024-04-18 PROCEDURE — 99204 OFFICE O/P NEW MOD 45 MIN: CPT | Mod: S$GLB,,, | Performed by: FAMILY MEDICINE

## 2024-04-18 PROCEDURE — 93005 ELECTROCARDIOGRAM TRACING: CPT | Mod: S$GLB,,, | Performed by: FAMILY MEDICINE

## 2024-04-18 PROCEDURE — 3074F SYST BP LT 130 MM HG: CPT | Mod: CPTII,S$GLB,, | Performed by: FAMILY MEDICINE

## 2024-04-18 PROCEDURE — 93010 ELECTROCARDIOGRAM REPORT: CPT | Mod: S$GLB,,, | Performed by: INTERNAL MEDICINE

## 2024-04-18 RX ORDER — SPIRONOLACTONE 50 MG/1
50 TABLET, FILM COATED ORAL DAILY
COMMUNITY
End: 2024-06-05

## 2024-04-18 RX ORDER — BUMETANIDE 1 MG/1
1 TABLET ORAL EVERY OTHER DAY
Qty: 45 TABLET | Refills: 3 | Status: SHIPPED | OUTPATIENT
Start: 2024-04-18 | End: 2025-04-18
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
## 2024-04-18 NOTE — PATIENT INSTRUCTIONS
Ask them to attach your lipid panel and A1c to your lab appointment.     Please call [phone] to schedule the heart monitor.

## 2024-04-19 LAB
OHS QRS DURATION: 76 MS
OHS QTC CALCULATION: 447 MS

## 2024-05-15 ENCOUNTER — OFFICE VISIT (OUTPATIENT)
Dept: OPTOMETRY | Facility: CLINIC | Age: 34
End: 2024-05-15
Payer: MEDICARE

## 2024-05-15 DIAGNOSIS — H47.20 OPTIC NERVE ATROPHY, BILATERAL: Primary | ICD-10-CM

## 2024-05-15 DIAGNOSIS — H54.3 BLINDNESS OF BOTH EYES: ICD-10-CM

## 2024-05-15 PROCEDURE — 92004 COMPRE OPH EXAM NEW PT 1/>: CPT | Mod: S$GLB,,, | Performed by: OPTOMETRIST

## 2024-05-15 PROCEDURE — 99999 PR PBB SHADOW E&M-EST. PATIENT-LVL III: CPT | Mod: PBBFAC,,, | Performed by: OPTOMETRIST

## 2024-05-15 PROCEDURE — 1160F RVW MEDS BY RX/DR IN RCRD: CPT | Mod: CPTII,S$GLB,, | Performed by: OPTOMETRIST

## 2024-05-15 PROCEDURE — 3066F NEPHROPATHY DOC TX: CPT | Mod: CPTII,S$GLB,, | Performed by: OPTOMETRIST

## 2024-05-15 PROCEDURE — 1159F MED LIST DOCD IN RCRD: CPT | Mod: CPTII,S$GLB,, | Performed by: OPTOMETRIST

## 2024-05-15 PROCEDURE — 4010F ACE/ARB THERAPY RXD/TAKEN: CPT | Mod: CPTII,S$GLB,, | Performed by: OPTOMETRIST

## 2024-05-15 NOTE — PROGRESS NOTES
Subjective:       Patient ID: Kaylie Fuentes is a 34 y.o. female      Chief Complaint   Patient presents with    Concerns About Ocular Health     History of Present Illness   Dls: 4 yrs [place] [facility]    35 y/o female presents today for hypertensive eye exam.   Pt states no changes in vision. Pt does not wear any glasses.     No tearing  No itching  No burning  No pain  No ha's  No floaters  No flashes    Eye meds  None    Pohx:   None    Fohx:   None         Assessment/Plan:     1. Optic nerve atrophy, bilateral  2. Blindness of both eyes  Per pt symptoms started around 4 years ago when she suffered a hypertensive emergency and kidney failure, vision declined gradually over a month with OD declining first. On exam 4+ optic nerve pallor bilateral. Discussed with pt that vision loss possibly related to hypertensive emergency and kidney failure and unlikely for vision to return due to duration of vision loss and nerve pallor. Pt here with mom today, will refer to neuro-ophthalmology for consult and further evaluation    - Ambulatory referral/consult to Ophthalmology    Follow up for neuro-ophthalmology.

## 2024-05-24 ENCOUNTER — TELEPHONE (OUTPATIENT)
Dept: OPHTHALMOLOGY | Facility: CLINIC | Age: 34
End: 2024-05-24
Payer: MEDICARE

## 2024-05-24 NOTE — TELEPHONE ENCOUNTER
----- Message from Nancy Schulz MA sent at 5/15/2024 10:45 AM CDT -----  Regarding: OPTIC ATROPHY OU  Good morning,    Dr. Castelalnos is requesting an appointment for the patient above. ( Optic Atrophy OU ).    Thank you,  Nancy Schulz

## 2024-05-27 PROBLEM — N39.0 UTI (URINARY TRACT INFECTION), UNCOMPLICATED: Status: RESOLVED | Noted: 2024-02-21 | Resolved: 2024-05-27

## 2024-06-20 ENCOUNTER — PATIENT MESSAGE (OUTPATIENT)
Dept: FAMILY MEDICINE | Facility: CLINIC | Age: 34
End: 2024-06-20
Payer: MEDICARE

## 2024-06-21 ENCOUNTER — LAB VISIT (OUTPATIENT)
Dept: LAB | Facility: HOSPITAL | Age: 34
End: 2024-06-21
Payer: MEDICARE

## 2024-06-21 DIAGNOSIS — R10.9 RIGHT FLANK PAIN: ICD-10-CM

## 2024-06-21 DIAGNOSIS — E87.5 HYPERKALEMIA: ICD-10-CM

## 2024-06-21 DIAGNOSIS — N18.4 ANEMIA OF CHRONIC RENAL FAILURE, STAGE 4 (SEVERE): ICD-10-CM

## 2024-06-21 DIAGNOSIS — I10 HYPERTENSION, ESSENTIAL: ICD-10-CM

## 2024-06-21 DIAGNOSIS — E79.0 HYPERURICEMIA: ICD-10-CM

## 2024-06-21 DIAGNOSIS — D63.1 ANEMIA OF CHRONIC RENAL FAILURE, STAGE 4 (SEVERE): ICD-10-CM

## 2024-06-21 DIAGNOSIS — R80.1 PERSISTENT PROTEINURIA: ICD-10-CM

## 2024-06-21 DIAGNOSIS — N18.4 CHRONIC KIDNEY DISEASE, STAGE IV (SEVERE): ICD-10-CM

## 2024-06-21 DIAGNOSIS — E55.9 VITAMIN D DEFICIENCY: ICD-10-CM

## 2024-06-21 DIAGNOSIS — D50.9 IRON DEFICIENCY ANEMIA, UNSPECIFIED IRON DEFICIENCY ANEMIA TYPE: ICD-10-CM

## 2024-06-21 DIAGNOSIS — N25.81 SECONDARY HYPERPARATHYROIDISM: ICD-10-CM

## 2024-06-21 DIAGNOSIS — R60.0 LOCALIZED EDEMA: ICD-10-CM

## 2024-06-21 LAB
25(OH)D3+25(OH)D2 SERPL-MCNC: 37 NG/ML (ref 30–96)
ALBUMIN SERPL BCP-MCNC: 3.9 G/DL (ref 3.5–5.2)
ALP SERPL-CCNC: 85 U/L (ref 55–135)
ALT SERPL W/O P-5'-P-CCNC: 11 U/L (ref 10–44)
ANION GAP SERPL CALC-SCNC: 11 MMOL/L (ref 8–16)
AST SERPL-CCNC: 11 U/L (ref 10–40)
BACTERIA #/AREA URNS HPF: NORMAL /HPF
BASOPHILS # BLD AUTO: 0.04 K/UL (ref 0–0.2)
BASOPHILS NFR BLD: 0.4 % (ref 0–1.9)
BILIRUB SERPL-MCNC: 0.3 MG/DL (ref 0.1–1)
BILIRUB UR QL STRIP: NEGATIVE
BUN SERPL-MCNC: 26 MG/DL (ref 6–20)
CALCIUM SERPL-MCNC: 10.2 MG/DL (ref 8.7–10.5)
CHLORIDE SERPL-SCNC: 104 MMOL/L (ref 95–110)
CLARITY UR: CLEAR
CO2 SERPL-SCNC: 21 MMOL/L (ref 23–29)
COLOR UR: YELLOW
CREAT SERPL-MCNC: 3 MG/DL (ref 0.5–1.4)
CREAT UR-MCNC: 210 MG/DL (ref 15–325)
DIFFERENTIAL METHOD BLD: ABNORMAL
EOSINOPHIL # BLD AUTO: 0.4 K/UL (ref 0–0.5)
EOSINOPHIL NFR BLD: 3.9 % (ref 0–8)
ERYTHROCYTE [DISTWIDTH] IN BLOOD BY AUTOMATED COUNT: 15 % (ref 11.5–14.5)
EST. GFR  (NO RACE VARIABLE): 20 ML/MIN/1.73 M^2
FERRITIN SERPL-MCNC: 21 NG/ML (ref 20–300)
GLUCOSE SERPL-MCNC: 93 MG/DL (ref 70–110)
GLUCOSE UR QL STRIP: NEGATIVE
HCT VFR BLD AUTO: 35.4 % (ref 37–48.5)
HGB BLD-MCNC: 11.5 G/DL (ref 12–16)
HGB UR QL STRIP: NEGATIVE
HYALINE CASTS #/AREA URNS LPF: 0 /LPF
IMM GRANULOCYTES # BLD AUTO: 0.03 K/UL (ref 0–0.04)
IMM GRANULOCYTES NFR BLD AUTO: 0.3 % (ref 0–0.5)
IRON SERPL-MCNC: 36 UG/DL (ref 30–160)
KETONES UR QL STRIP: NEGATIVE
LEUKOCYTE ESTERASE UR QL STRIP: NEGATIVE
LYMPHOCYTES # BLD AUTO: 2.3 K/UL (ref 1–4.8)
LYMPHOCYTES NFR BLD: 21.8 % (ref 18–48)
MAGNESIUM SERPL-MCNC: 1.8 MG/DL (ref 1.6–2.6)
MCH RBC QN AUTO: 27.1 PG (ref 27–31)
MCHC RBC AUTO-ENTMCNC: 32.5 G/DL (ref 32–36)
MCV RBC AUTO: 83 FL (ref 82–98)
MICROSCOPIC COMMENT: NORMAL
MONOCYTES # BLD AUTO: 1 K/UL (ref 0.3–1)
MONOCYTES NFR BLD: 9.4 % (ref 4–15)
NEUTROPHILS # BLD AUTO: 6.8 K/UL (ref 1.8–7.7)
NEUTROPHILS NFR BLD: 64.2 % (ref 38–73)
NITRITE UR QL STRIP: NEGATIVE
NRBC BLD-RTO: 0 /100 WBC
PH UR STRIP: 7 [PH] (ref 5–8)
PHOSPHATE SERPL-MCNC: 3.6 MG/DL (ref 2.7–4.5)
PLATELET # BLD AUTO: 388 K/UL (ref 150–450)
PMV BLD AUTO: 10.2 FL (ref 9.2–12.9)
POTASSIUM SERPL-SCNC: 4.6 MMOL/L (ref 3.5–5.1)
PROT SERPL-MCNC: 8.4 G/DL (ref 6–8.4)
PROT UR QL STRIP: ABNORMAL
PROT UR-MCNC: 235 MG/DL (ref 0–15)
PROT/CREAT UR: 1.12 MG/G{CREAT} (ref 0–0.2)
PTH-INTACT SERPL-MCNC: 158.9 PG/ML (ref 9–77)
RBC # BLD AUTO: 4.25 M/UL (ref 4–5.4)
RBC #/AREA URNS HPF: 4 /HPF (ref 0–4)
SATURATED IRON: 10 % (ref 20–50)
SODIUM SERPL-SCNC: 136 MMOL/L (ref 136–145)
SP GR UR STRIP: 1.01 (ref 1–1.03)
SQUAMOUS #/AREA URNS HPF: 6 /HPF
TOTAL IRON BINDING CAPACITY: 343 UG/DL (ref 250–450)
TRANSFERRIN SERPL-MCNC: 232 MG/DL (ref 200–375)
URATE SERPL-MCNC: 7 MG/DL (ref 2.4–5.7)
URN SPEC COLLECT METH UR: ABNORMAL
UROBILINOGEN UR STRIP-ACNC: NEGATIVE EU/DL
WBC # BLD AUTO: 10.62 K/UL (ref 3.9–12.7)
WBC #/AREA URNS HPF: 2 /HPF (ref 0–5)

## 2024-06-21 PROCEDURE — 83540 ASSAY OF IRON: CPT

## 2024-06-21 PROCEDURE — 82570 ASSAY OF URINE CREATININE: CPT

## 2024-06-21 PROCEDURE — 84550 ASSAY OF BLOOD/URIC ACID: CPT

## 2024-06-21 PROCEDURE — 82306 VITAMIN D 25 HYDROXY: CPT

## 2024-06-21 PROCEDURE — 83735 ASSAY OF MAGNESIUM: CPT

## 2024-06-21 PROCEDURE — 83970 ASSAY OF PARATHORMONE: CPT

## 2024-06-21 PROCEDURE — 82728 ASSAY OF FERRITIN: CPT

## 2024-06-21 PROCEDURE — 36415 COLL VENOUS BLD VENIPUNCTURE: CPT

## 2024-06-21 PROCEDURE — 85025 COMPLETE CBC W/AUTO DIFF WBC: CPT

## 2024-06-21 PROCEDURE — 81000 URINALYSIS NONAUTO W/SCOPE: CPT

## 2024-06-21 PROCEDURE — 80053 COMPREHEN METABOLIC PANEL: CPT

## 2024-06-21 PROCEDURE — 84100 ASSAY OF PHOSPHORUS: CPT

## 2024-06-26 ENCOUNTER — OFFICE VISIT (OUTPATIENT)
Dept: FAMILY MEDICINE | Facility: CLINIC | Age: 34
End: 2024-06-26
Payer: MEDICARE

## 2024-06-26 VITALS
TEMPERATURE: 99 F | OXYGEN SATURATION: 94 % | BODY MASS INDEX: 43.91 KG/M2 | SYSTOLIC BLOOD PRESSURE: 110 MMHG | DIASTOLIC BLOOD PRESSURE: 80 MMHG | HEART RATE: 73 BPM | WEIGHT: 293 LBS

## 2024-06-26 DIAGNOSIS — E66.01 CLASS 3 SEVERE OBESITY DUE TO EXCESS CALORIES WITH SERIOUS COMORBIDITY AND BODY MASS INDEX (BMI) OF 40.0 TO 44.9 IN ADULT: ICD-10-CM

## 2024-06-26 DIAGNOSIS — E28.2 PCOS (POLYCYSTIC OVARIAN SYNDROME): ICD-10-CM

## 2024-06-26 DIAGNOSIS — H54.3 BLINDNESS OF BOTH EYES: ICD-10-CM

## 2024-06-26 DIAGNOSIS — N18.4 CHRONIC KIDNEY DISEASE, STAGE IV (SEVERE): ICD-10-CM

## 2024-06-26 DIAGNOSIS — D50.9 IRON DEFICIENCY ANEMIA, UNSPECIFIED IRON DEFICIENCY ANEMIA TYPE: ICD-10-CM

## 2024-06-26 DIAGNOSIS — I10 HYPERTENSION, ESSENTIAL: ICD-10-CM

## 2024-06-26 DIAGNOSIS — Z01.818 PRE-OP EXAM: Primary | ICD-10-CM

## 2024-06-26 PROCEDURE — 3008F BODY MASS INDEX DOCD: CPT | Mod: CPTII,S$GLB,, | Performed by: NURSE PRACTITIONER

## 2024-06-26 PROCEDURE — 1159F MED LIST DOCD IN RCRD: CPT | Mod: CPTII,S$GLB,, | Performed by: NURSE PRACTITIONER

## 2024-06-26 PROCEDURE — 99999 PR PBB SHADOW E&M-EST. PATIENT-LVL IV: CPT | Mod: PBBFAC,,, | Performed by: NURSE PRACTITIONER

## 2024-06-26 PROCEDURE — 4010F ACE/ARB THERAPY RXD/TAKEN: CPT | Mod: CPTII,S$GLB,, | Performed by: NURSE PRACTITIONER

## 2024-06-26 PROCEDURE — 3066F NEPHROPATHY DOC TX: CPT | Mod: CPTII,S$GLB,, | Performed by: NURSE PRACTITIONER

## 2024-06-26 PROCEDURE — 99214 OFFICE O/P EST MOD 30 MIN: CPT | Mod: S$GLB,,, | Performed by: NURSE PRACTITIONER

## 2024-06-26 PROCEDURE — 3079F DIAST BP 80-89 MM HG: CPT | Mod: CPTII,S$GLB,, | Performed by: NURSE PRACTITIONER

## 2024-06-26 PROCEDURE — 3074F SYST BP LT 130 MM HG: CPT | Mod: CPTII,S$GLB,, | Performed by: NURSE PRACTITIONER
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
## 2024-06-26 NOTE — PATIENT INSTRUCTIONS
Medical Fitness--[phone]  Imaging, Xray, CT, MRI, Ultrasound---[phone]  Bariatrics---[phone]  Breast Surgery---[phone]  Case Management---[phone]  Colonoscopy---[phone]  DME---[phone]  Infectious Disease---[phone]  Interventional Radiology---[phone]  Medical Records---[phone]  [facility]---[phone]  Optometry/Ophthalmology---[phone]  [facility]---[phone]  Physical Therapy---[phone]  Psychiatry---[phone] or [phone]  Plastic Surgery---[phone]  Recovery--[phone] option 2, or [phone].  Sleep Study---[phone]  Smoking Cessation---[phone]  Wound Care---[phone]  Referral Desk---[phone]      Patient Education       Obesity Discharge Instructions, Adult   About this topic   Obesity is a health problem where your weight is higher than it should be. Doctors use a method called body mass index or BMI to measure whether you are at a healthy weight for your height. The doctor uses your weight and your height to determine your BMI. A high BMI can be an indicator of high body fat. Obesity is different from being overweight. Being overweight means your BMI is 25 or higher. Being obese means your BMI is 30 or higher. If your BMI is 40 or higher, you are considered severely or morbidly obese. Being obese may lead to many health problems. It may make it hard for you to breathe and move easily. It may raise your risk for illnesses like high blood sugar and heart disease.  What care is needed at home?   Ask your doctor what you need to do when you go home. Make sure you understand everything the doctor says. This way you will know what you need to do.  Change your diet. Lower the calories in your diet. Ask your dietitian to help you set an eating plan that is right for you.  Get enough exercise. Talk to your doctor about the right amount of exercise for you.  Do not smoke.  Limit the amount of beer, wine, and mixed drinks  (alcohol) you drink.  Keep a record of your weight. Write down what you eat and drink each day. This may help you be more aware of the choices you are making.  What drugs may be needed?   The doctor may order drugs to:  Treat health problems that may cause weight gain  Lower your appetite  Help you lose weight  Will physical activity be limited?   Talk to your doctor about the right amount of exercise for you. This is especially important if you have heart problems, other illnesses, or if you recently had surgery.  Start slowly by doing more everyday activities like yard work or household chores.  Choose activities that you like to do.  What changes to diet are needed?   Whole grains are a good source of carbohydrates and fiber. Try to eat 3 to 5 servings of whole grain, high fiber foods each day. These are things like whole grain bread, bran cereals, brown rice, and whole wheat pasta.  Fruits and vegetables are good sources of fiber, vitamins, and minerals. Try to pick many kinds and colors. This will help you get different nutrients in your diet. Choose fresh, frozen, or canned fruits and vegetables. Buy plain, frozen fruits without added sugar. Buy plain, frozen vegetables without added salt and fat. Buy canned fruit in 100% juice or water. Avoid canned fruit in syrups. Buy canned vegetables with no salt added.  Milk is a good source of protein and some vitamins and minerals. Choose low-fat (1%) or fat-free milk. Eat nonfat or low-fat cheeses, ice creams, yogurt, and other dairy products.  Meats and beans are good sources of protein and iron. Eat more low-fat or lean meats like chicken without the skin, turkey without the skin, and fish. Eggs and peanut butter are good sources of protein as well. Dried peas, beans, and lentils are also good and contain fiber. Fatty fish, like salmon, tuna, mackerel, herring, and trout, are good to eat and have healthy omega-3 fats.  Good fats can give you long-term energy. These  are found in fish, nuts, seeds, and avocados. Try using olive oil, safflower oil, and low-sodium, low-fat salad dressing as a topping on foods. Use canola, olive, or peanut oil for cooking. Other healthy oils include corn, sunflower, and soybean oils.  Limit sweets such as candy and sugary drinks. Try to drink water instead. Drink diet or no calorie beverages when you want something other than water.  Cut back on solid fats, like butter, lard, and coconut oil.  Limit fatty foods such as desserts, fried foods, and chips.  Trans fats should be avoided. Most trans fats are found in processed foods, commercially baked goods, and fried foods and are very unhealthy. Saturated fat, which is different from trans fat, should be watched and limited if portions are too large. Saturated fat is found mainly in animal sources, such as meat and dairy products. Saturated fat is also found in coconut and palm oils.  Limit processed meats and most processed foods.  Limit eating out. If you choose to visit a restaurant, ask for the nutritional facts. You may also be able to find nutritional facts online. Then, you can make a plan and choose healthy items. Watch the portion size. Have large portions split and take part home for some other meal.  What problems could happen?   Low mood or self-esteem  Anxiety  Muscle pain, joint pain, or arthritis  Sleep apnea  Diabetes  High blood pressure  High cholesterol  Heart, lung, or breathing problems  Kidney or liver problems  Cancer  Gallstones  Increased sweating  Reduced fertility  Pregnancy complications  Gastroesophageal reflux disease  When do I need to call the doctor?   Signs of high or low blood sugar  Thoughts of hurting yourself  Teach Back: Helping You Understand   The Teach Back Method helps you understand the information we are giving you. After you talk with the staff, tell them in your own words what you learned. This helps to make sure the staff has described each thing  "clearly. It also helps to explain things that may have been confusing. Before going home, make sure you can do these:  I can tell you about my condition.  I can tell you what changes I need to make with my diet or drugs.  I can tell you what I will do if I have signs of a heart attack or stroke.  Where can I learn more?   Centers for Disease Control and Prevention  http://www.cdc.gov/obesity/adult/defining.html   [facility]  http://www.nhs.uk/Conditions/Obesity/Pages/obesityprevention.aspx   Last Reviewed Date   2021-06-23  Consumer Information Use and Disclaimer   This information is not specific medical advice and does not replace information you receive from your health care provider. This is only a brief summary of general information. It does NOT include all information about conditions, illnesses, injuries, tests, procedures, treatments, therapies, discharge instructions or life-style choices that may apply to you. You must talk with your health care provider for complete information about your health and treatment options. This information should not be used to decide whether or not to accept your health care providers advice, instructions or recommendations. Only your health care provider has the knowledge and training to provide advice that is right for you.  Copyright   Copyright © 2021 [employer]. and its affiliates and/or licensors. All rights reserved.  Patient Education       Weight Loss Diet   About this topic   There are many "trendy" weight loss diets that are popular today. Many of these diets can end up being more harmful than helpful. The healthiest way to lose weight is to burn more calories than you eat.  A weight loss diet should help you have a healthy view of eating. It is NOT healthy to stop eating to try and lose weight. A good diet plan will help you cut down your food intake and make healthy choices.  A healthy weight loss goal is 1 to 2 pounds (0.5 to 1 kg) per week. Reducing calories in " your diet, burning calories through exercise, or both can help you lose weight. Combining a healthy diet with regular physical activity can help you get the best results.  To cut calories in your diet you can:  Switch from whole milk to 1% or skim milk.  Switch from regular cheese to low-fat or fat-free cheese.  Use healthier condiment choices:  Fat-free or low-fat sour cream or salad dressings  Spray butter  Diet syrups or jellies over regular  Try frozen yogurt as a dessert rather than eating ice cream.  Skip the chips. Snack on carrots, vegetables, or fruit. If chips are a favorite of yours, try the baked style and watch portion size.  Eat grilled, roasted, boiled, broiled, or baked meats. Avoid deep-frying. Choose skinless poultry, lean red meat, lean cuts of pork, and fish for good protein sources.  Try flavored no-calorie dueñas. Do not drink soda and juices that have many calories.  Choose fruit instead of sweets.  General   Eating smaller meals more often may be helpful. This will keep you from overeating at your next meal. Also, eating meals slowly helps you feel full faster.  If eating 3 meals is a part of your lifestyle, choose more lean proteins and higher fiber foods to fill you up at each meal.  Do not skip meals. Most often if you skip a meal, you eat too much at the next meal.  Eat smaller portions. Use a smaller plate or bowl for meals, and when you are eating out, eat half and take the rest home.  Plan ahead. Plan your meals and grocery list before going to the store. Planning will keep you from getting meals from restaurants.  Do not go to the grocery store hungry. You are more likely to buy snacks that are not good for you.  Portion out snacks. When you are having a snack, instead of grabbing the whole bag, portion a small amount out to give yourself a stopping point.  Drink water before and after your meals to help fill you up without the calories.  When eating starchy foods, choose whole-grain  products. These have a lot of fiber which will make you feel full. Fiber also helps lower cholesterol and helps with bowel function.  If you need a helpful start, ask your doctor to send you to a dietitian for weight loss help.         What will the results be?   Losing excess weight will make your whole body healthier. You will have more energy for your daily activities and lower your risk for health problems.  What lifestyle changes are needed?   Stay active. Eating healthy is not always enough to lose weight. Burning calories by exercising is a big part of weight loss.  What foods are good to eat?   The key is to watch your portion sizes. It is best to choose foods that are lower in fat and calories.  Choose lean meats:  Boneless, skinless chicken breast  Pork loin  90% lean beef  Lean turkey meat  Fresh fish (not fried)  Choose low-fat dairy products:  1% or skim milk  Spray butter or margarine  Low-fat or fat-free cheese  Frozen yogurt or low-calorie ice cream  Choose fresh fruits, vegetables, beans and lentils, and whole wheat products more often.  Choose water to drink more often. Drink diet or no-calorie beverages when you want something other than water. Aim to get your calories from the foods you eat.  Choose smart snacks:  Fruits  Vegetables  Low-fat or nonfat yogurt  Low-fat or no-fat cheese, such as cottage cheese  Unsalted nuts  Hard-boiled egg  Hummus  Guacamole  Natural peanut butter  Popcorn with no butter ? use pepper, garlic, or another spice to taste  Whole grain crackers  What foods should be limited or avoided?   Limit high-fat, high-sodium, and high-calorie foods like:  Fried foods  Processed meats  Whole-fat dairy products  Candy, cookies, chips, pastries  Sausage, jay, any full-fat meats  Soda, juice  Beer, wine, and mixed drinks (alcohol)  Will there be any other care needed?   What do I do first before trying to lose weight?  Talk to your doctor and dietitian to see if you need to lose  weight. Work with them to set your weight loss goals.  If you have a chronic illness, such as high blood sugar or high blood pressure, ask a doctor or dietitian what diet and exercise is right for you.  Ask your doctor about how much you are able to exercise and what type of exercise is good for you.  Helpful tips   Keep a food journal to help keep you on track.  Join a support group.  Tips for burning calories:  If your workplace is near your house, choose to walk or bike to work instead of driving.  Take 20-minute walks each day. Walk around during your lunch break. You will not only burn calories, but will raise your energy for the rest of the day.  Take the stairs over the elevator.  Join a gym or exercise class with a friend.  Try to exercise 30 minutes a day for overall health. Three 10-minute sessions work too. Aim for 60 to 90 minutes a day to lose weight.  Drink lots of water before, during, and after exercise.  Where can I learn more?   Academy of Nutrition and Dietetics  https://www.eatright.org/health/weight-loss/your-health-and-your-weight/back-to-basics-for-healthy-weight-loss   Centers for Disease Control and Prevention  https://www.cdc.gov/healthyweight/healthy_eating/index.html   Familydoctor.org  https://familydoctor.org/nutrition-weight-loss-need-know-fad-diets/   [facility]  https://www.nhs.uk/live-well/healthy-weight/12-tips-to-help-you-lose-weight/?tabname=you-and-your-weight   Last Reviewed Date   2021-06-24  Consumer Information Use and Disclaimer   This information is not specific medical advice and does not replace information you receive from your health care provider. This is only a brief summary of general information. It does NOT include all information about conditions, illnesses, injuries, tests, procedures, treatments, therapies, discharge instructions or life-style choices that may apply to you. You must talk with your health care provider for complete information about your health and  treatment options. This information should not be used to decide whether or not to accept your health care providers advice, instructions or recommendations. Only your health care provider has the knowledge and training to provide advice that is right for you.  Copyright   Copyright © 2021 UpToDate, Inc. and its affiliates and/or licensors. All rights reserved.

## 2024-06-27 ENCOUNTER — HOSPITAL ENCOUNTER (OUTPATIENT)
Dept: RADIOLOGY | Facility: HOSPITAL | Age: 34
Discharge: HOME | End: 2024-06-27
Attending: NURSE PRACTITIONER
Payer: MEDICARE

## 2024-06-27 DIAGNOSIS — Z01.818 PRE-OP EXAM: ICD-10-CM

## 2024-06-27 PROCEDURE — 71046 X-RAY EXAM CHEST 2 VIEWS: CPT | Mod: 26,,, | Performed by: RADIOLOGY

## 2024-06-27 PROCEDURE — 71046 X-RAY EXAM CHEST 2 VIEWS: CPT | Mod: TC,FY,PO

## 2024-07-15 ENCOUNTER — LAB VISIT (OUTPATIENT)
Dept: LAB | Facility: HOSPITAL | Age: 34
End: 2024-07-15
Payer: MEDICARE

## 2024-07-15 DIAGNOSIS — E79.0 HYPERURICEMIA: ICD-10-CM

## 2024-07-15 DIAGNOSIS — R10.9 ABDOMINAL PAIN, UNSPECIFIED ABDOMINAL LOCATION: ICD-10-CM

## 2024-07-15 DIAGNOSIS — N18.4 CHRONIC KIDNEY DISEASE, STAGE IV (SEVERE): ICD-10-CM

## 2024-07-15 DIAGNOSIS — E87.5 HYPERKALEMIA: ICD-10-CM

## 2024-07-15 DIAGNOSIS — I10 HYPERTENSION, ESSENTIAL: ICD-10-CM

## 2024-07-15 DIAGNOSIS — D63.1 ANEMIA OF CHRONIC RENAL FAILURE, STAGE 4 (SEVERE): ICD-10-CM

## 2024-07-15 DIAGNOSIS — E55.9 VITAMIN D DEFICIENCY: ICD-10-CM

## 2024-07-15 DIAGNOSIS — R80.1 PERSISTENT PROTEINURIA: ICD-10-CM

## 2024-07-15 DIAGNOSIS — N18.4 ANEMIA OF CHRONIC RENAL FAILURE, STAGE 4 (SEVERE): ICD-10-CM

## 2024-07-15 DIAGNOSIS — R10.9 RIGHT FLANK PAIN: ICD-10-CM

## 2024-07-15 DIAGNOSIS — R60.0 LOCALIZED EDEMA: ICD-10-CM

## 2024-07-15 DIAGNOSIS — N39.0 UTI (URINARY TRACT INFECTION), UNCOMPLICATED: ICD-10-CM

## 2024-07-15 DIAGNOSIS — N25.81 SECONDARY HYPERPARATHYROIDISM: ICD-10-CM

## 2024-07-15 DIAGNOSIS — D50.9 IRON DEFICIENCY ANEMIA, UNSPECIFIED IRON DEFICIENCY ANEMIA TYPE: ICD-10-CM

## 2024-07-15 LAB
ALBUMIN SERPL BCP-MCNC: 4 G/DL (ref 3.5–5.2)
ALP SERPL-CCNC: 76 U/L (ref 55–135)
ALT SERPL W/O P-5'-P-CCNC: 11 U/L (ref 10–44)
ANION GAP SERPL CALC-SCNC: 11 MMOL/L (ref 8–16)
AST SERPL-CCNC: 14 U/L (ref 10–40)
BACTERIA #/AREA URNS HPF: NORMAL /HPF
BASOPHILS # BLD AUTO: 0.05 K/UL (ref 0–0.2)
BASOPHILS NFR BLD: 0.5 % (ref 0–1.9)
BILIRUB SERPL-MCNC: 0.2 MG/DL (ref 0.1–1)
BILIRUB UR QL STRIP: NEGATIVE
BUN SERPL-MCNC: 27 MG/DL (ref 6–20)
CALCIUM SERPL-MCNC: 9.8 MG/DL (ref 8.7–10.5)
CHLORIDE SERPL-SCNC: 104 MMOL/L (ref 95–110)
CLARITY UR: CLEAR
CO2 SERPL-SCNC: 21 MMOL/L (ref 23–29)
COLOR UR: YELLOW
CREAT SERPL-MCNC: 3.8 MG/DL (ref 0.5–1.4)
CREAT UR-MCNC: 288.2 MG/DL (ref 15–325)
DIFFERENTIAL METHOD BLD: ABNORMAL
EOSINOPHIL # BLD AUTO: 0.3 K/UL (ref 0–0.5)
EOSINOPHIL NFR BLD: 2.7 % (ref 0–8)
ERYTHROCYTE [DISTWIDTH] IN BLOOD BY AUTOMATED COUNT: 15 % (ref 11.5–14.5)
EST. GFR  (NO RACE VARIABLE): 15 ML/MIN/1.73 M^2
GLUCOSE SERPL-MCNC: 86 MG/DL (ref 70–110)
GLUCOSE UR QL STRIP: NEGATIVE
HCT VFR BLD AUTO: 36.2 % (ref 37–48.5)
HGB BLD-MCNC: 11.1 G/DL (ref 12–16)
HGB UR QL STRIP: NEGATIVE
HYALINE CASTS #/AREA URNS LPF: 1 /LPF
IMM GRANULOCYTES # BLD AUTO: 0.03 K/UL (ref 0–0.04)
IMM GRANULOCYTES NFR BLD AUTO: 0.3 % (ref 0–0.5)
KETONES UR QL STRIP: NEGATIVE
LEUKOCYTE ESTERASE UR QL STRIP: NEGATIVE
LYMPHOCYTES # BLD AUTO: 2.6 K/UL (ref 1–4.8)
LYMPHOCYTES NFR BLD: 25.3 % (ref 18–48)
MAGNESIUM SERPL-MCNC: 2 MG/DL (ref 1.6–2.6)
MCH RBC QN AUTO: 26.4 PG (ref 27–31)
MCHC RBC AUTO-ENTMCNC: 30.7 G/DL (ref 32–36)
MCV RBC AUTO: 86 FL (ref 82–98)
MICROSCOPIC COMMENT: NORMAL
MONOCYTES # BLD AUTO: 0.9 K/UL (ref 0.3–1)
MONOCYTES NFR BLD: 9.1 % (ref 4–15)
NEUTROPHILS # BLD AUTO: 6.4 K/UL (ref 1.8–7.7)
NEUTROPHILS NFR BLD: 62.1 % (ref 38–73)
NITRITE UR QL STRIP: NEGATIVE
NRBC BLD-RTO: 0 /100 WBC
PH UR STRIP: 6 [PH] (ref 5–8)
PHOSPHATE SERPL-MCNC: 3.9 MG/DL (ref 2.7–4.5)
PLATELET # BLD AUTO: 379 K/UL (ref 150–450)
PMV BLD AUTO: 11.1 FL (ref 9.2–12.9)
POTASSIUM SERPL-SCNC: 4.3 MMOL/L (ref 3.5–5.1)
PROT SERPL-MCNC: 7.8 G/DL (ref 6–8.4)
PROT UR QL STRIP: ABNORMAL
PROT UR-MCNC: 105 MG/DL
PROT/CREAT UR: 0.36 MG/G{CREAT} (ref 0–0.2)
PTH-INTACT SERPL-MCNC: 209.2 PG/ML (ref 9–77)
RBC # BLD AUTO: 4.2 M/UL (ref 4–5.4)
RBC #/AREA URNS HPF: 3 /HPF (ref 0–4)
SODIUM SERPL-SCNC: 136 MMOL/L (ref 136–145)
SP GR UR STRIP: 1.02 (ref 1–1.03)
SQUAMOUS #/AREA URNS HPF: 2 /HPF
URATE SERPL-MCNC: 6.9 MG/DL (ref 2.4–5.7)
URN SPEC COLLECT METH UR: ABNORMAL
UROBILINOGEN UR STRIP-ACNC: NEGATIVE EU/DL
WBC # BLD AUTO: 10.33 K/UL (ref 3.9–12.7)
WBC #/AREA URNS HPF: 3 /HPF (ref 0–5)

## 2024-07-15 PROCEDURE — 84100 ASSAY OF PHOSPHORUS: CPT

## 2024-07-15 PROCEDURE — 85025 COMPLETE CBC W/AUTO DIFF WBC: CPT

## 2024-07-15 PROCEDURE — 84550 ASSAY OF BLOOD/URIC ACID: CPT

## 2024-07-15 PROCEDURE — 82570 ASSAY OF URINE CREATININE: CPT

## 2024-07-15 PROCEDURE — 80053 COMPREHEN METABOLIC PANEL: CPT

## 2024-07-15 PROCEDURE — 83970 ASSAY OF PARATHORMONE: CPT

## 2024-07-15 PROCEDURE — 81000 URINALYSIS NONAUTO W/SCOPE: CPT

## 2024-07-15 PROCEDURE — 36415 COLL VENOUS BLD VENIPUNCTURE: CPT

## 2024-07-15 PROCEDURE — 83735 ASSAY OF MAGNESIUM: CPT

## 2024-07-15 PROCEDURE — 82306 VITAMIN D 25 HYDROXY: CPT

## 2024-07-16 LAB — 25(OH)D3+25(OH)D2 SERPL-MCNC: 40 NG/ML (ref 30–96)

## 2024-09-03 ENCOUNTER — LAB VISIT (OUTPATIENT)
Dept: LAB | Facility: HOSPITAL | Age: 34
End: 2024-09-03
Payer: MEDICARE

## 2024-09-03 DIAGNOSIS — N25.81 SECONDARY HYPERPARATHYROIDISM: ICD-10-CM

## 2024-09-03 DIAGNOSIS — I10 HYPERTENSION, ESSENTIAL: ICD-10-CM

## 2024-09-03 DIAGNOSIS — N18.4 CHRONIC KIDNEY DISEASE, STAGE IV (SEVERE): ICD-10-CM

## 2024-09-03 DIAGNOSIS — E55.9 VITAMIN D DEFICIENCY: ICD-10-CM

## 2024-09-03 DIAGNOSIS — E79.0 HYPERURICEMIA: ICD-10-CM

## 2024-09-03 DIAGNOSIS — R80.1 PERSISTENT PROTEINURIA: ICD-10-CM

## 2024-09-03 DIAGNOSIS — D63.1 ANEMIA OF CHRONIC RENAL FAILURE, STAGE 4 (SEVERE): ICD-10-CM

## 2024-09-03 DIAGNOSIS — N18.4 ANEMIA OF CHRONIC RENAL FAILURE, STAGE 4 (SEVERE): ICD-10-CM

## 2024-09-03 DIAGNOSIS — E87.5 HYPERKALEMIA: ICD-10-CM

## 2024-09-03 DIAGNOSIS — D50.9 IRON DEFICIENCY ANEMIA, UNSPECIFIED IRON DEFICIENCY ANEMIA TYPE: ICD-10-CM

## 2024-09-03 LAB
ALBUMIN SERPL BCP-MCNC: 4.4 G/DL (ref 3.5–5.2)
ALP SERPL-CCNC: 82 U/L (ref 55–135)
ALT SERPL W/O P-5'-P-CCNC: 14 U/L (ref 10–44)
ANION GAP SERPL CALC-SCNC: 12 MMOL/L (ref 8–16)
AST SERPL-CCNC: 14 U/L (ref 10–40)
BACTERIA #/AREA URNS HPF: ABNORMAL /HPF
BASOPHILS # BLD AUTO: 0.05 K/UL (ref 0–0.2)
BASOPHILS NFR BLD: 0.4 % (ref 0–1.9)
BILIRUB SERPL-MCNC: 0.4 MG/DL (ref 0.1–1)
BILIRUB UR QL STRIP: NEGATIVE
BUN SERPL-MCNC: 57 MG/DL (ref 6–20)
CALCIUM SERPL-MCNC: 10.2 MG/DL (ref 8.7–10.5)
CHLORIDE SERPL-SCNC: 106 MMOL/L (ref 95–110)
CLARITY UR: ABNORMAL
CO2 SERPL-SCNC: 15 MMOL/L (ref 23–29)
COLOR UR: YELLOW
CREAT SERPL-MCNC: 4.4 MG/DL (ref 0.5–1.4)
CREAT UR-MCNC: 382.9 MG/DL (ref 15–325)
DIFFERENTIAL METHOD BLD: ABNORMAL
EOSINOPHIL # BLD AUTO: 0.3 K/UL (ref 0–0.5)
EOSINOPHIL NFR BLD: 2.7 % (ref 0–8)
ERYTHROCYTE [DISTWIDTH] IN BLOOD BY AUTOMATED COUNT: 15.9 % (ref 11.5–14.5)
EST. GFR  (NO RACE VARIABLE): 13 ML/MIN/1.73 M^2
FERRITIN SERPL-MCNC: 94 NG/ML (ref 20–300)
GLUCOSE SERPL-MCNC: 79 MG/DL (ref 70–110)
GLUCOSE UR QL STRIP: NEGATIVE
HCT VFR BLD AUTO: 36.1 % (ref 37–48.5)
HGB BLD-MCNC: 11.3 G/DL (ref 12–16)
HGB UR QL STRIP: NEGATIVE
HYALINE CASTS #/AREA URNS LPF: 1 /LPF
IMM GRANULOCYTES # BLD AUTO: 0.03 K/UL (ref 0–0.04)
IMM GRANULOCYTES NFR BLD AUTO: 0.3 % (ref 0–0.5)
IRON SERPL-MCNC: 32 UG/DL (ref 30–160)
KETONES UR QL STRIP: ABNORMAL
LEUKOCYTE ESTERASE UR QL STRIP: ABNORMAL
LYMPHOCYTES # BLD AUTO: 1.9 K/UL (ref 1–4.8)
LYMPHOCYTES NFR BLD: 16.9 % (ref 18–48)
MAGNESIUM SERPL-MCNC: 2.2 MG/DL (ref 1.6–2.6)
MCH RBC QN AUTO: 27.7 PG (ref 27–31)
MCHC RBC AUTO-ENTMCNC: 31.3 G/DL (ref 32–36)
MCV RBC AUTO: 89 FL (ref 82–98)
MICROSCOPIC COMMENT: ABNORMAL
MONOCYTES # BLD AUTO: 0.8 K/UL (ref 0.3–1)
MONOCYTES NFR BLD: 6.6 % (ref 4–15)
NEUTROPHILS # BLD AUTO: 8.4 K/UL (ref 1.8–7.7)
NEUTROPHILS NFR BLD: 73.1 % (ref 38–73)
NITRITE UR QL STRIP: NEGATIVE
NRBC BLD-RTO: 0 /100 WBC
PH UR STRIP: 6 [PH] (ref 5–8)
PHOSPHATE SERPL-MCNC: 5.4 MG/DL (ref 2.7–4.5)
PLATELET # BLD AUTO: 316 K/UL (ref 150–450)
PMV BLD AUTO: 11.7 FL (ref 9.2–12.9)
POTASSIUM SERPL-SCNC: 4.9 MMOL/L (ref 3.5–5.1)
PROT SERPL-MCNC: 8.4 G/DL (ref 6–8.4)
PROT UR QL STRIP: ABNORMAL
PROT UR-MCNC: 65 MG/DL
PROT/CREAT UR: 0.17 MG/G{CREAT} (ref 0–0.2)
PTH-INTACT SERPL-MCNC: 123.2 PG/ML (ref 9–77)
RBC # BLD AUTO: 4.08 M/UL (ref 4–5.4)
RBC #/AREA URNS HPF: 5 /HPF (ref 0–4)
SATURATED IRON: 12 % (ref 20–50)
SODIUM SERPL-SCNC: 133 MMOL/L (ref 136–145)
SP GR UR STRIP: 1.02 (ref 1–1.03)
SQUAMOUS #/AREA URNS HPF: 18 /HPF
TOTAL IRON BINDING CAPACITY: 274 UG/DL (ref 250–450)
TRANSFERRIN SERPL-MCNC: 185 MG/DL (ref 200–375)
URATE SERPL-MCNC: 9.7 MG/DL (ref 2.4–5.7)
URN SPEC COLLECT METH UR: ABNORMAL
UROBILINOGEN UR STRIP-ACNC: NEGATIVE EU/DL
WBC # BLD AUTO: 11.45 K/UL (ref 3.9–12.7)
WBC #/AREA URNS HPF: 18 /HPF (ref 0–5)

## 2024-09-03 PROCEDURE — 82306 VITAMIN D 25 HYDROXY: CPT

## 2024-09-03 PROCEDURE — 84550 ASSAY OF BLOOD/URIC ACID: CPT

## 2024-09-03 PROCEDURE — 84466 ASSAY OF TRANSFERRIN: CPT

## 2024-09-03 PROCEDURE — 83735 ASSAY OF MAGNESIUM: CPT

## 2024-09-03 PROCEDURE — 86706 HEP B SURFACE ANTIBODY: CPT | Mod: 91

## 2024-09-03 PROCEDURE — 84156 ASSAY OF PROTEIN URINE: CPT

## 2024-09-03 PROCEDURE — 87340 HEPATITIS B SURFACE AG IA: CPT

## 2024-09-03 PROCEDURE — 85025 COMPLETE CBC W/AUTO DIFF WBC: CPT

## 2024-09-03 PROCEDURE — 80053 COMPREHEN METABOLIC PANEL: CPT

## 2024-09-03 PROCEDURE — 81000 URINALYSIS NONAUTO W/SCOPE: CPT

## 2024-09-03 PROCEDURE — 36415 COLL VENOUS BLD VENIPUNCTURE: CPT

## 2024-09-03 PROCEDURE — 86704 HEP B CORE ANTIBODY TOTAL: CPT

## 2024-09-03 PROCEDURE — 84100 ASSAY OF PHOSPHORUS: CPT

## 2024-09-03 PROCEDURE — 83970 ASSAY OF PARATHORMONE: CPT

## 2024-09-03 PROCEDURE — 82728 ASSAY OF FERRITIN: CPT

## 2024-09-04 LAB
25(OH)D3+25(OH)D2 SERPL-MCNC: 42 NG/ML (ref 30–96)
HBV CORE AB SERPL QL IA: NORMAL
HBV SURFACE AB SER-ACNC: <3 MIU/ML
HBV SURFACE AB SER-ACNC: NORMAL M[IU]/ML
HBV SURFACE AG SERPL QL IA: NORMAL

## 2024-09-10 ENCOUNTER — LAB VISIT (OUTPATIENT)
Dept: LAB | Facility: HOSPITAL | Age: 34
End: 2024-09-10
Attending: INTERNAL MEDICINE
Payer: MEDICARE

## 2024-09-10 DIAGNOSIS — R80.1 PERSISTENT PROTEINURIA: ICD-10-CM

## 2024-09-10 DIAGNOSIS — D63.1 ANEMIA OF CHRONIC RENAL FAILURE, STAGE 4 (SEVERE): ICD-10-CM

## 2024-09-10 DIAGNOSIS — R60.0 LOCALIZED EDEMA: ICD-10-CM

## 2024-09-10 DIAGNOSIS — N25.81 SECONDARY HYPERPARATHYROIDISM: ICD-10-CM

## 2024-09-10 DIAGNOSIS — E55.9 VITAMIN D DEFICIENCY: ICD-10-CM

## 2024-09-10 DIAGNOSIS — I10 HYPERTENSION, ESSENTIAL: ICD-10-CM

## 2024-09-10 DIAGNOSIS — D50.9 IRON DEFICIENCY ANEMIA, UNSPECIFIED IRON DEFICIENCY ANEMIA TYPE: ICD-10-CM

## 2024-09-10 DIAGNOSIS — N18.4 CHRONIC KIDNEY DISEASE, STAGE IV (SEVERE): ICD-10-CM

## 2024-09-10 DIAGNOSIS — R10.9 RIGHT FLANK PAIN: ICD-10-CM

## 2024-09-10 DIAGNOSIS — N18.4 ANEMIA OF CHRONIC RENAL FAILURE, STAGE 4 (SEVERE): ICD-10-CM

## 2024-09-10 DIAGNOSIS — E79.0 HYPERURICEMIA: ICD-10-CM

## 2024-09-10 DIAGNOSIS — E87.5 HYPERKALEMIA: ICD-10-CM

## 2024-09-10 DIAGNOSIS — N39.0 UTI (URINARY TRACT INFECTION), UNCOMPLICATED: ICD-10-CM

## 2024-09-10 LAB
ANION GAP SERPL CALC-SCNC: 9 MMOL/L (ref 8–16)
BUN SERPL-MCNC: 55 MG/DL (ref 6–20)
CALCIUM SERPL-MCNC: 10.4 MG/DL (ref 8.7–10.5)
CHLORIDE SERPL-SCNC: 106 MMOL/L (ref 95–110)
CO2 SERPL-SCNC: 19 MMOL/L (ref 23–29)
CREAT SERPL-MCNC: 4.1 MG/DL (ref 0.5–1.4)
EST. GFR  (NO RACE VARIABLE): 14 ML/MIN/1.73 M^2
GLUCOSE SERPL-MCNC: 89 MG/DL (ref 70–110)
POTASSIUM SERPL-SCNC: 4.6 MMOL/L (ref 3.5–5.1)
SODIUM SERPL-SCNC: 134 MMOL/L (ref 136–145)

## 2024-09-10 PROCEDURE — 80048 BASIC METABOLIC PNL TOTAL CA: CPT | Performed by: INTERNAL MEDICINE

## 2024-09-10 PROCEDURE — 36415 COLL VENOUS BLD VENIPUNCTURE: CPT | Performed by: INTERNAL MEDICINE

## 2024-09-19 ENCOUNTER — HOSPITAL ENCOUNTER (OUTPATIENT)
Dept: RADIOLOGY | Facility: HOSPITAL | Age: 34
Discharge: HOME | End: 2024-09-19
Attending: INTERNAL MEDICINE
Payer: MEDICARE

## 2024-09-19 DIAGNOSIS — N18.4 CHRONIC KIDNEY DISEASE, STAGE IV (SEVERE): ICD-10-CM

## 2024-09-19 PROCEDURE — 93970 EXTREMITY STUDY: CPT | Mod: TC

## 2024-09-25 ENCOUNTER — OFFICE VISIT (OUTPATIENT)
Dept: OBSTETRICS AND GYNECOLOGY | Facility: CLINIC | Age: 34
End: 2024-09-25
Payer: MEDICARE

## 2024-09-25 ENCOUNTER — LAB VISIT (OUTPATIENT)
Dept: LAB | Facility: HOSPITAL | Age: 34
End: 2024-09-25
Attending: OBSTETRICS & GYNECOLOGY
Payer: MEDICARE

## 2024-09-25 VITALS — DIASTOLIC BLOOD PRESSURE: 80 MMHG | BODY MASS INDEX: 37.9 KG/M2 | SYSTOLIC BLOOD PRESSURE: 118 MMHG | WEIGHT: 264.13 LBS

## 2024-09-25 DIAGNOSIS — R80.1 PERSISTENT PROTEINURIA: ICD-10-CM

## 2024-09-25 DIAGNOSIS — D50.9 IRON DEFICIENCY ANEMIA, UNSPECIFIED IRON DEFICIENCY ANEMIA TYPE: ICD-10-CM

## 2024-09-25 DIAGNOSIS — Z12.4 ENCOUNTER FOR PAPANICOLAOU SMEAR FOR CERVICAL CANCER SCREENING: ICD-10-CM

## 2024-09-25 DIAGNOSIS — R80.9 MICROALBUMINURIA: ICD-10-CM

## 2024-09-25 DIAGNOSIS — D63.1 ANEMIA OF CHRONIC RENAL FAILURE, STAGE 4 (SEVERE): ICD-10-CM

## 2024-09-25 DIAGNOSIS — N25.81 SECONDARY HYPERPARATHYROIDISM: ICD-10-CM

## 2024-09-25 DIAGNOSIS — A60.00 HERPES SIMPLEX INFECTION OF GENITOURINARY SYSTEM: ICD-10-CM

## 2024-09-25 DIAGNOSIS — E87.5 HYPERKALEMIA: ICD-10-CM

## 2024-09-25 DIAGNOSIS — R10.32 LLQ PAIN: ICD-10-CM

## 2024-09-25 DIAGNOSIS — Z11.3 SCREEN FOR STD (SEXUALLY TRANSMITTED DISEASE): ICD-10-CM

## 2024-09-25 DIAGNOSIS — E55.9 VITAMIN D DEFICIENCY: ICD-10-CM

## 2024-09-25 DIAGNOSIS — N18.4 ANEMIA OF CHRONIC RENAL FAILURE, STAGE 4 (SEVERE): ICD-10-CM

## 2024-09-25 DIAGNOSIS — E79.0 HYPERURICEMIA: ICD-10-CM

## 2024-09-25 DIAGNOSIS — N18.4 CHRONIC KIDNEY DISEASE, STAGE IV (SEVERE): ICD-10-CM

## 2024-09-25 DIAGNOSIS — I10 HYPERTENSION, ESSENTIAL: ICD-10-CM

## 2024-09-25 DIAGNOSIS — Z11.3 SCREEN FOR STD (SEXUALLY TRANSMITTED DISEASE): Primary | ICD-10-CM

## 2024-09-25 DIAGNOSIS — Z01.419 WELL WOMAN EXAM WITH ROUTINE GYNECOLOGICAL EXAM: ICD-10-CM

## 2024-09-25 LAB
ALBUMIN SERPL BCP-MCNC: 4.1 G/DL (ref 3.5–5.2)
ALP SERPL-CCNC: 80 U/L (ref 55–135)
ALT SERPL W/O P-5'-P-CCNC: 11 U/L (ref 10–44)
ANION GAP SERPL CALC-SCNC: 10 MMOL/L (ref 8–16)
AST SERPL-CCNC: 14 U/L (ref 10–40)
BASOPHILS # BLD AUTO: 0.05 K/UL (ref 0–0.2)
BASOPHILS NFR BLD: 0.5 % (ref 0–1.9)
BILIRUB SERPL-MCNC: 0.3 MG/DL (ref 0.1–1)
BUN SERPL-MCNC: 38 MG/DL (ref 6–20)
CALCIUM SERPL-MCNC: 10.4 MG/DL (ref 8.7–10.5)
CHLORIDE SERPL-SCNC: 106 MMOL/L (ref 95–110)
CO2 SERPL-SCNC: 21 MMOL/L (ref 23–29)
CREAT SERPL-MCNC: 3.8 MG/DL (ref 0.5–1.4)
DIFFERENTIAL METHOD BLD: ABNORMAL
EOSINOPHIL # BLD AUTO: 0.4 K/UL (ref 0–0.5)
EOSINOPHIL NFR BLD: 3.6 % (ref 0–8)
ERYTHROCYTE [DISTWIDTH] IN BLOOD BY AUTOMATED COUNT: 16 % (ref 11.5–14.5)
EST. GFR  (NO RACE VARIABLE): 15 ML/MIN/1.73 M^2
GLUCOSE SERPL-MCNC: 93 MG/DL (ref 70–110)
HCT VFR BLD AUTO: 34.6 % (ref 37–48.5)
HGB BLD-MCNC: 10.8 G/DL (ref 12–16)
IMM GRANULOCYTES # BLD AUTO: 0.03 K/UL (ref 0–0.04)
IMM GRANULOCYTES NFR BLD AUTO: 0.3 % (ref 0–0.5)
LYMPHOCYTES # BLD AUTO: 3 K/UL (ref 1–4.8)
LYMPHOCYTES NFR BLD: 28.2 % (ref 18–48)
MAGNESIUM SERPL-MCNC: 2.5 MG/DL (ref 1.6–2.6)
MCH RBC QN AUTO: 27.3 PG (ref 27–31)
MCHC RBC AUTO-ENTMCNC: 31.2 G/DL (ref 32–36)
MCV RBC AUTO: 88 FL (ref 82–98)
MONOCYTES # BLD AUTO: 0.8 K/UL (ref 0.3–1)
MONOCYTES NFR BLD: 7.4 % (ref 4–15)
NEUTROPHILS # BLD AUTO: 6.4 K/UL (ref 1.8–7.7)
NEUTROPHILS NFR BLD: 60 % (ref 38–73)
NRBC BLD-RTO: 0 /100 WBC
PHOSPHATE SERPL-MCNC: 3.6 MG/DL (ref 2.7–4.5)
PLATELET # BLD AUTO: 340 K/UL (ref 150–450)
PMV BLD AUTO: 10.6 FL (ref 9.2–12.9)
POTASSIUM SERPL-SCNC: 4.7 MMOL/L (ref 3.5–5.1)
PROT SERPL-MCNC: 8.1 G/DL (ref 6–8.4)
RBC # BLD AUTO: 3.95 M/UL (ref 4–5.4)
SODIUM SERPL-SCNC: 137 MMOL/L (ref 136–145)
SPECIMEN SOURCE: NORMAL
WBC # BLD AUTO: 10.71 K/UL (ref 3.9–12.7)

## 2024-09-25 PROCEDURE — 87491 CHLMYD TRACH DNA AMP PROBE: CPT | Performed by: OBSTETRICS & GYNECOLOGY

## 2024-09-25 PROCEDURE — 81000 URINALYSIS NONAUTO W/SCOPE: CPT | Performed by: INTERNAL MEDICINE

## 2024-09-25 PROCEDURE — 86704 HEP B CORE ANTIBODY TOTAL: CPT | Performed by: INTERNAL MEDICINE

## 2024-09-25 PROCEDURE — 36415 COLL VENOUS BLD VENIPUNCTURE: CPT | Performed by: INTERNAL MEDICINE

## 2024-09-25 PROCEDURE — 82570 ASSAY OF URINE CREATININE: CPT | Performed by: INTERNAL MEDICINE

## 2024-09-25 PROCEDURE — 87591 N.GONORRHOEAE DNA AMP PROB: CPT | Performed by: OBSTETRICS & GYNECOLOGY

## 2024-09-25 PROCEDURE — 86593 SYPHILIS TEST NON-TREP QUANT: CPT | Performed by: OBSTETRICS & GYNECOLOGY

## 2024-09-25 PROCEDURE — 86706 HEP B SURFACE ANTIBODY: CPT | Mod: 91 | Performed by: INTERNAL MEDICINE

## 2024-09-25 PROCEDURE — 80053 COMPREHEN METABOLIC PANEL: CPT | Performed by: INTERNAL MEDICINE

## 2024-09-25 PROCEDURE — 87529 HSV DNA AMP PROBE: CPT | Performed by: OBSTETRICS & GYNECOLOGY

## 2024-09-25 PROCEDURE — 80074 ACUTE HEPATITIS PANEL: CPT | Performed by: OBSTETRICS & GYNECOLOGY

## 2024-09-25 PROCEDURE — 84100 ASSAY OF PHOSPHORUS: CPT | Performed by: INTERNAL MEDICINE

## 2024-09-25 PROCEDURE — 85025 COMPLETE CBC W/AUTO DIFF WBC: CPT | Performed by: INTERNAL MEDICINE

## 2024-09-25 PROCEDURE — 87389 HIV-1 AG W/HIV-1&-2 AB AG IA: CPT | Performed by: OBSTETRICS & GYNECOLOGY

## 2024-09-25 PROCEDURE — 99999 PR PBB SHADOW E&M-EST. PATIENT-LVL III: CPT | Mod: PBBFAC,,, | Performed by: OBSTETRICS & GYNECOLOGY

## 2024-09-25 PROCEDURE — 86695 HERPES SIMPLEX TYPE 1 TEST: CPT | Performed by: OBSTETRICS & GYNECOLOGY

## 2024-09-25 PROCEDURE — 83735 ASSAY OF MAGNESIUM: CPT | Performed by: INTERNAL MEDICINE

## 2024-09-25 RX ORDER — VALACYCLOVIR HYDROCHLORIDE 1 G/1
1000 TABLET, FILM COATED ORAL EVERY 12 HOURS
Qty: 14 TABLET | Refills: 0 | Status: SHIPPED | OUTPATIENT
Start: 2024-09-25 | End: 2024-10-02
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
## 2024-09-26 LAB
BACTERIA #/AREA URNS HPF: NORMAL /HPF
BILIRUB UR QL STRIP: NEGATIVE
CLARITY UR: CLEAR
COLOR UR: YELLOW
CREAT UR-MCNC: 145.9 MG/DL (ref 15–325)
GLUCOSE UR QL STRIP: NEGATIVE
HAV IGM SERPL QL IA: NORMAL
HBV CORE AB SERPL QL IA: NORMAL
HBV CORE IGM SERPL QL IA: NORMAL
HBV SURFACE AB SER-ACNC: <3 MIU/ML
HBV SURFACE AB SER-ACNC: NORMAL M[IU]/ML
HBV SURFACE AG SERPL QL IA: NORMAL
HBV SURFACE AG SERPL QL IA: NORMAL
HCV AB SERPL QL IA: NORMAL
HGB UR QL STRIP: NEGATIVE
HIV 1+2 AB+HIV1 P24 AG SERPL QL IA: NORMAL
KETONES UR QL STRIP: NEGATIVE
LEUKOCYTE ESTERASE UR QL STRIP: ABNORMAL
MICROSCOPIC COMMENT: NORMAL
NITRITE UR QL STRIP: NEGATIVE
PH UR STRIP: 6 [PH] (ref 5–8)
PROT UR QL STRIP: ABNORMAL
PROT UR-MCNC: 22 MG/DL
PROT/CREAT UR: 0.15 MG/G{CREAT} (ref 0–0.2)
SP GR UR STRIP: 1.01 (ref 1–1.03)
SQUAMOUS #/AREA URNS HPF: 3 /HPF
TREPONEMA PALLIDUM IGG+IGM AB [PRESENCE] IN SERUM OR PLASMA BY IMMUNOASSAY: NONREACTIVE
URN SPEC COLLECT METH UR: ABNORMAL
UROBILINOGEN UR STRIP-ACNC: NEGATIVE EU/DL
WBC #/AREA URNS HPF: 2 /HPF (ref 0–5)

## 2024-09-27 LAB
HSV1 IGG SERPL QL IA: NEGATIVE
HSV2 IGG SERPL QL IA: POSITIVE

## 2024-09-28 LAB
BACTERIAL VAGINOSIS DNA: POSITIVE
C TRACH DNA SPEC QL NAA+PROBE: NOT DETECTED
CANDIDA GLABRATA DNA: NEGATIVE
CANDIDA KRUSEI DNA: NEGATIVE
CANDIDA RRNA VAG QL PROBE: NEGATIVE
N GONORRHOEA DNA SPEC QL NAA+PROBE: NOT DETECTED
T VAGINALIS RRNA GENITAL QL PROBE: NEGATIVE

## 2024-09-28 RX ORDER — METRONIDAZOLE 500 MG/1
500 TABLET ORAL EVERY 12 HOURS
Qty: 14 TABLET | Refills: 0 | Status: SHIPPED | OUTPATIENT
Start: 2024-09-28 | End: 2024-10-05

## 2024-09-30 ENCOUNTER — TELEPHONE (OUTPATIENT)
Dept: OBSTETRICS AND GYNECOLOGY | Facility: CLINIC | Age: 34
End: 2024-09-30
Payer: MEDICARE

## 2024-10-02 ENCOUNTER — LAB VISIT (OUTPATIENT)
Dept: LAB | Facility: HOSPITAL | Age: 34
End: 2024-10-02
Attending: INTERNAL MEDICINE
Payer: MEDICARE

## 2024-10-02 DIAGNOSIS — D63.1 ANEMIA OF CHRONIC RENAL FAILURE, STAGE 4 (SEVERE): ICD-10-CM

## 2024-10-02 DIAGNOSIS — I10 HYPERTENSION, ESSENTIAL: ICD-10-CM

## 2024-10-02 DIAGNOSIS — N18.4 ANEMIA OF CHRONIC RENAL FAILURE, STAGE 4 (SEVERE): ICD-10-CM

## 2024-10-02 DIAGNOSIS — N25.81 SECONDARY HYPERPARATHYROIDISM: ICD-10-CM

## 2024-10-02 DIAGNOSIS — N39.0 UTI (URINARY TRACT INFECTION), UNCOMPLICATED: ICD-10-CM

## 2024-10-02 DIAGNOSIS — E79.0 HYPERURICEMIA: ICD-10-CM

## 2024-10-02 DIAGNOSIS — N18.4 CHRONIC KIDNEY DISEASE, STAGE IV (SEVERE): ICD-10-CM

## 2024-10-02 DIAGNOSIS — R80.1 PERSISTENT PROTEINURIA: ICD-10-CM

## 2024-10-02 DIAGNOSIS — R10.9 RIGHT FLANK PAIN: ICD-10-CM

## 2024-10-02 DIAGNOSIS — D50.9 IRON DEFICIENCY ANEMIA, UNSPECIFIED IRON DEFICIENCY ANEMIA TYPE: ICD-10-CM

## 2024-10-02 DIAGNOSIS — E55.9 VITAMIN D DEFICIENCY: ICD-10-CM

## 2024-10-02 DIAGNOSIS — R60.0 LOCALIZED EDEMA: ICD-10-CM

## 2024-10-02 DIAGNOSIS — E87.5 HYPERKALEMIA: ICD-10-CM

## 2024-10-02 LAB
ANION GAP SERPL CALC-SCNC: 9 MMOL/L (ref 8–16)
BUN SERPL-MCNC: 39 MG/DL (ref 6–20)
CALCIUM SERPL-MCNC: 10.2 MG/DL (ref 8.7–10.5)
CHLORIDE SERPL-SCNC: 105 MMOL/L (ref 95–110)
CO2 SERPL-SCNC: 20 MMOL/L (ref 23–29)
CREAT SERPL-MCNC: 3.7 MG/DL (ref 0.5–1.4)
EST. GFR  (NO RACE VARIABLE): 16 ML/MIN/1.73 M^2
GLUCOSE SERPL-MCNC: 96 MG/DL (ref 70–110)
POTASSIUM SERPL-SCNC: 4.8 MMOL/L (ref 3.5–5.1)
SODIUM SERPL-SCNC: 134 MMOL/L (ref 136–145)

## 2024-10-02 PROCEDURE — 36415 COLL VENOUS BLD VENIPUNCTURE: CPT | Performed by: INTERNAL MEDICINE

## 2024-10-02 PROCEDURE — 80048 BASIC METABOLIC PNL TOTAL CA: CPT | Performed by: INTERNAL MEDICINE

## 2024-10-04 ENCOUNTER — TELEPHONE (OUTPATIENT)
Dept: INFUSION THERAPY | Facility: HOSPITAL | Age: 34
End: 2024-10-04
Payer: MEDICARE

## 2024-10-08 ENCOUNTER — INFUSION (OUTPATIENT)
Dept: INFUSION THERAPY | Facility: HOSPITAL | Age: 34
End: 2024-10-08
Attending: INTERNAL MEDICINE
Payer: MEDICARE

## 2024-10-08 ENCOUNTER — HOSPITAL ENCOUNTER (OUTPATIENT)
Dept: RADIOLOGY | Facility: HOSPITAL | Age: 34
Discharge: HOME | End: 2024-10-08
Attending: OBSTETRICS & GYNECOLOGY
Payer: MEDICARE

## 2024-10-08 DIAGNOSIS — N18.4 CHRONIC KIDNEY DISEASE, STAGE IV (SEVERE): Primary | ICD-10-CM

## 2024-10-08 DIAGNOSIS — R10.32 LLQ PAIN: ICD-10-CM

## 2024-10-08 PROCEDURE — 96360 HYDRATION IV INFUSION INIT: CPT

## 2024-10-08 PROCEDURE — 76856 US EXAM PELVIC COMPLETE: CPT | Mod: 26,,, | Performed by: RADIOLOGY

## 2024-10-08 PROCEDURE — 76856 US EXAM PELVIC COMPLETE: CPT | Mod: TC

## 2024-10-08 PROCEDURE — 25000003 PHARM REV CODE 250

## 2024-10-08 RX ORDER — SODIUM CHLORIDE 0.9 % (FLUSH) 0.9 %
10 SYRINGE (ML) INJECTION
OUTPATIENT
Start: 2024-10-08

## 2024-10-08 RX ORDER — HEPARIN 100 UNIT/ML
500 SYRINGE INTRAVENOUS
OUTPATIENT
Start: 2024-10-08

## 2024-10-08 RX ADMIN — SODIUM CHLORIDE 1000 ML: 9 INJECTION, SOLUTION INTRAVENOUS at 11:10

## 2024-10-08 NOTE — PLAN OF CARE
Arrived on unit per self using visual asissistance cane. C/o of  lessened appetite due to gastric sleeve surgery. States she meets volume goals but has difficulty meeting protein goals. Well versed in goals for both. PIV inserted L arm and secured with tegaderm & coban dressing. 1L NS infused per pump over 1 hour. Tolerated well. Calendar for next visit on 10/22/24 given to patient. Patient left ambulatory per self.

## 2024-10-09 ENCOUNTER — TELEPHONE (OUTPATIENT)
Dept: OBSTETRICS AND GYNECOLOGY | Facility: CLINIC | Age: 34
End: 2024-10-09
Payer: MEDICARE

## 2024-10-09 NOTE — TELEPHONE ENCOUNTER
Pt notified...  ----- Message from Samuel Nelson MD sent at 10/9/2024  3:39 PM CDT -----  Please inform normal pelvic ultrasound

## 2024-10-22 ENCOUNTER — INFUSION (OUTPATIENT)
Dept: INFUSION THERAPY | Facility: HOSPITAL | Age: 34
End: 2024-10-22
Payer: MEDICARE

## 2024-10-22 VITALS
DIASTOLIC BLOOD PRESSURE: 95 MMHG | HEART RATE: 63 BPM | TEMPERATURE: 98 F | SYSTOLIC BLOOD PRESSURE: 152 MMHG | OXYGEN SATURATION: 99 % | RESPIRATION RATE: 16 BRPM

## 2024-10-22 DIAGNOSIS — D63.1 ANEMIA OF CHRONIC RENAL FAILURE, STAGE 4 (SEVERE): ICD-10-CM

## 2024-10-22 DIAGNOSIS — R80.1 PERSISTENT PROTEINURIA: ICD-10-CM

## 2024-10-22 DIAGNOSIS — R60.0 LOCALIZED EDEMA: ICD-10-CM

## 2024-10-22 DIAGNOSIS — E87.20 ACIDOSIS: ICD-10-CM

## 2024-10-22 DIAGNOSIS — E79.0 HYPERURICEMIA: ICD-10-CM

## 2024-10-22 DIAGNOSIS — I10 HYPERTENSION, ESSENTIAL: ICD-10-CM

## 2024-10-22 DIAGNOSIS — N18.4 CHRONIC KIDNEY DISEASE, STAGE IV (SEVERE): Primary | ICD-10-CM

## 2024-10-22 DIAGNOSIS — N25.81 SECONDARY HYPERPARATHYROIDISM: ICD-10-CM

## 2024-10-22 DIAGNOSIS — E26.9 HYPERALDOSTERONISM, UNSPECIFIED: ICD-10-CM

## 2024-10-22 DIAGNOSIS — N18.4 ANEMIA OF CHRONIC RENAL FAILURE, STAGE 4 (SEVERE): ICD-10-CM

## 2024-10-22 DIAGNOSIS — E55.9 VITAMIN D DEFICIENCY: ICD-10-CM

## 2024-10-22 DIAGNOSIS — D50.9 IRON DEFICIENCY ANEMIA, UNSPECIFIED IRON DEFICIENCY ANEMIA TYPE: ICD-10-CM

## 2024-10-22 DIAGNOSIS — N39.0 UTI (URINARY TRACT INFECTION), UNCOMPLICATED: ICD-10-CM

## 2024-10-22 DIAGNOSIS — R10.9 RIGHT FLANK PAIN: ICD-10-CM

## 2024-10-22 LAB
ANION GAP SERPL CALC-SCNC: 10 MMOL/L (ref 8–16)
BUN SERPL-MCNC: 24 MG/DL (ref 6–20)
CALCIUM SERPL-MCNC: 9.6 MG/DL (ref 8.7–10.5)
CHLORIDE SERPL-SCNC: 109 MMOL/L (ref 95–110)
CO2 SERPL-SCNC: 19 MMOL/L (ref 23–29)
CREAT SERPL-MCNC: 2.9 MG/DL (ref 0.5–1.4)
EST. GFR  (NO RACE VARIABLE): 21 ML/MIN/1.73 M^2
GLUCOSE SERPL-MCNC: 94 MG/DL (ref 70–110)
POTASSIUM SERPL-SCNC: 3.8 MMOL/L (ref 3.5–5.1)
SODIUM SERPL-SCNC: 138 MMOL/L (ref 136–145)

## 2024-10-22 PROCEDURE — 96361 HYDRATE IV INFUSION ADD-ON: CPT

## 2024-10-22 PROCEDURE — 36415 COLL VENOUS BLD VENIPUNCTURE: CPT

## 2024-10-22 PROCEDURE — 96360 HYDRATION IV INFUSION INIT: CPT

## 2024-10-22 PROCEDURE — 80048 BASIC METABOLIC PNL TOTAL CA: CPT

## 2024-10-22 PROCEDURE — 25000003 PHARM REV CODE 250: Performed by: INTERNAL MEDICINE

## 2024-10-22 RX ORDER — HEPARIN 100 UNIT/ML
500 SYRINGE INTRAVENOUS
OUTPATIENT
Start: 2024-10-22

## 2024-10-22 RX ORDER — SODIUM CHLORIDE 0.9 % (FLUSH) 0.9 %
10 SYRINGE (ML) INJECTION
OUTPATIENT
Start: 2024-10-22

## 2024-10-22 RX ADMIN — SODIUM CHLORIDE 1000 ML: 9 INJECTION, SOLUTION INTRAVENOUS at 11:10

## 2024-10-22 NOTE — PLAN OF CARE
Pt arrived to clinic for scheduled IVF's and BMP. VSS. Lab drawn and sent. Infusion tolerated well. Voices no concerns at this time. Ambulated from clinic with family member in NAD.

## 2024-11-05 ENCOUNTER — INFUSION (OUTPATIENT)
Dept: INFUSION THERAPY | Facility: HOSPITAL | Age: 34
End: 2024-11-05
Payer: MEDICARE

## 2024-11-05 VITALS
TEMPERATURE: 98 F | DIASTOLIC BLOOD PRESSURE: 94 MMHG | OXYGEN SATURATION: 99 % | HEART RATE: 85 BPM | RESPIRATION RATE: 16 BRPM | SYSTOLIC BLOOD PRESSURE: 143 MMHG

## 2024-11-05 DIAGNOSIS — N18.4 ANEMIA OF CHRONIC RENAL FAILURE, STAGE 4 (SEVERE): ICD-10-CM

## 2024-11-05 DIAGNOSIS — R10.9 RIGHT FLANK PAIN: ICD-10-CM

## 2024-11-05 DIAGNOSIS — D50.9 IRON DEFICIENCY ANEMIA, UNSPECIFIED IRON DEFICIENCY ANEMIA TYPE: ICD-10-CM

## 2024-11-05 DIAGNOSIS — R80.1 PERSISTENT PROTEINURIA: ICD-10-CM

## 2024-11-05 DIAGNOSIS — E87.20 ACIDOSIS: ICD-10-CM

## 2024-11-05 DIAGNOSIS — I10 HYPERTENSION, ESSENTIAL: ICD-10-CM

## 2024-11-05 DIAGNOSIS — R60.0 LOCALIZED EDEMA: ICD-10-CM

## 2024-11-05 DIAGNOSIS — N18.4 CHRONIC KIDNEY DISEASE, STAGE IV (SEVERE): Primary | ICD-10-CM

## 2024-11-05 DIAGNOSIS — N39.0 UTI (URINARY TRACT INFECTION), UNCOMPLICATED: ICD-10-CM

## 2024-11-05 DIAGNOSIS — D63.1 ANEMIA OF CHRONIC RENAL FAILURE, STAGE 4 (SEVERE): ICD-10-CM

## 2024-11-05 DIAGNOSIS — E26.9 HYPERALDOSTERONISM, UNSPECIFIED: ICD-10-CM

## 2024-11-05 DIAGNOSIS — E55.9 VITAMIN D DEFICIENCY: ICD-10-CM

## 2024-11-05 DIAGNOSIS — N25.81 SECONDARY HYPERPARATHYROIDISM: ICD-10-CM

## 2024-11-05 DIAGNOSIS — E79.0 HYPERURICEMIA: ICD-10-CM

## 2024-11-05 LAB
ANION GAP SERPL CALC-SCNC: 11 MMOL/L (ref 8–16)
BUN SERPL-MCNC: 21 MG/DL (ref 6–20)
CALCIUM SERPL-MCNC: 10.3 MG/DL (ref 8.7–10.5)
CHLORIDE SERPL-SCNC: 108 MMOL/L (ref 95–110)
CO2 SERPL-SCNC: 19 MMOL/L (ref 23–29)
CREAT SERPL-MCNC: 2.9 MG/DL (ref 0.5–1.4)
EST. GFR  (NO RACE VARIABLE): 21 ML/MIN/1.73 M^2
GLUCOSE SERPL-MCNC: 93 MG/DL (ref 70–110)
POTASSIUM SERPL-SCNC: 4.1 MMOL/L (ref 3.5–5.1)
SODIUM SERPL-SCNC: 138 MMOL/L (ref 136–145)

## 2024-11-05 PROCEDURE — 80048 BASIC METABOLIC PNL TOTAL CA: CPT

## 2024-11-05 PROCEDURE — 25000003 PHARM REV CODE 250: Performed by: INTERNAL MEDICINE

## 2024-11-05 RX ORDER — SODIUM CHLORIDE 0.9 % (FLUSH) 0.9 %
10 SYRINGE (ML) INJECTION
OUTPATIENT
Start: 2024-11-05

## 2024-11-05 RX ORDER — HEPARIN 100 UNIT/ML
500 SYRINGE INTRAVENOUS
OUTPATIENT
Start: 2024-11-05

## 2024-11-05 RX ADMIN — SODIUM CHLORIDE 1000 ML: 9 INJECTION, SOLUTION INTRAVENOUS at 12:11

## 2024-11-05 NOTE — NURSING
Patient arrived to unit ambulatory, for IV therapy. Patient alert and oriented with no new or worsening complaints. IV fluid infusion administered per plan. Patient tolerated infusion with no complaints or signs of reaction, vital signs stable, no apparent distress at this time. Patient ambulatory upon discharge.

## 2024-11-08 ENCOUNTER — LAB VISIT (OUTPATIENT)
Dept: LAB | Facility: HOSPITAL | Age: 34
End: 2024-11-08
Attending: INTERNAL MEDICINE
Payer: MEDICARE

## 2024-11-08 DIAGNOSIS — R80.1 PERSISTENT PROTEINURIA: ICD-10-CM

## 2024-11-08 DIAGNOSIS — R80.9 MICROALBUMINURIA: ICD-10-CM

## 2024-11-08 DIAGNOSIS — E79.0 HYPERURICEMIA: ICD-10-CM

## 2024-11-08 DIAGNOSIS — D50.9 IRON DEFICIENCY ANEMIA, UNSPECIFIED IRON DEFICIENCY ANEMIA TYPE: ICD-10-CM

## 2024-11-08 DIAGNOSIS — N18.4 CHRONIC KIDNEY DISEASE, STAGE IV (SEVERE): ICD-10-CM

## 2024-11-08 DIAGNOSIS — N18.4 ANEMIA OF CHRONIC RENAL FAILURE, STAGE 4 (SEVERE): ICD-10-CM

## 2024-11-08 DIAGNOSIS — D63.1 ANEMIA OF CHRONIC RENAL FAILURE, STAGE 4 (SEVERE): ICD-10-CM

## 2024-11-08 DIAGNOSIS — E87.5 HYPERKALEMIA: ICD-10-CM

## 2024-11-08 LAB
ALBUMIN SERPL BCP-MCNC: 4 G/DL (ref 3.5–5.2)
ALP SERPL-CCNC: 80 U/L (ref 40–150)
ALT SERPL W/O P-5'-P-CCNC: 9 U/L (ref 10–44)
ANION GAP SERPL CALC-SCNC: 11 MMOL/L (ref 8–16)
AST SERPL-CCNC: 13 U/L (ref 10–40)
BASOPHILS # BLD AUTO: 0.03 K/UL (ref 0–0.2)
BASOPHILS NFR BLD: 0.4 % (ref 0–1.9)
BILIRUB SERPL-MCNC: 0.3 MG/DL (ref 0.1–1)
BUN SERPL-MCNC: 22 MG/DL (ref 6–20)
CALCIUM SERPL-MCNC: 9.7 MG/DL (ref 8.7–10.5)
CHLORIDE SERPL-SCNC: 107 MMOL/L (ref 95–110)
CO2 SERPL-SCNC: 22 MMOL/L (ref 23–29)
CREAT SERPL-MCNC: 3 MG/DL (ref 0.5–1.4)
DIFFERENTIAL METHOD BLD: ABNORMAL
EOSINOPHIL # BLD AUTO: 0.2 K/UL (ref 0–0.5)
EOSINOPHIL NFR BLD: 2.8 % (ref 0–8)
ERYTHROCYTE [DISTWIDTH] IN BLOOD BY AUTOMATED COUNT: 16 % (ref 11.5–14.5)
EST. GFR  (NO RACE VARIABLE): 20 ML/MIN/1.73 M^2
GLUCOSE SERPL-MCNC: 89 MG/DL (ref 70–110)
HCT VFR BLD AUTO: 35.5 % (ref 37–48.5)
HGB BLD-MCNC: 10.8 G/DL (ref 12–16)
IMM GRANULOCYTES # BLD AUTO: 0.02 K/UL (ref 0–0.04)
IMM GRANULOCYTES NFR BLD AUTO: 0.2 % (ref 0–0.5)
LYMPHOCYTES # BLD AUTO: 2.3 K/UL (ref 1–4.8)
LYMPHOCYTES NFR BLD: 27.6 % (ref 18–48)
MAGNESIUM SERPL-MCNC: 2 MG/DL (ref 1.6–2.6)
MCH RBC QN AUTO: 26.7 PG (ref 27–31)
MCHC RBC AUTO-ENTMCNC: 30.4 G/DL (ref 32–36)
MCV RBC AUTO: 88 FL (ref 82–98)
MONOCYTES # BLD AUTO: 0.5 K/UL (ref 0.3–1)
MONOCYTES NFR BLD: 6.3 % (ref 4–15)
NEUTROPHILS # BLD AUTO: 5.3 K/UL (ref 1.8–7.7)
NEUTROPHILS NFR BLD: 62.7 % (ref 38–73)
NRBC BLD-RTO: 0 /100 WBC
PHOSPHATE SERPL-MCNC: 4.2 MG/DL (ref 2.7–4.5)
PLATELET # BLD AUTO: 297 K/UL (ref 150–450)
PMV BLD AUTO: 13 FL (ref 9.2–12.9)
POTASSIUM SERPL-SCNC: 3.9 MMOL/L (ref 3.5–5.1)
PROT SERPL-MCNC: 8 G/DL (ref 6–8.4)
PTH-INTACT SERPL-MCNC: 234.3 PG/ML (ref 9–77)
RBC # BLD AUTO: 4.04 M/UL (ref 4–5.4)
SODIUM SERPL-SCNC: 140 MMOL/L (ref 136–145)
URATE SERPL-MCNC: 7.9 MG/DL (ref 2.4–5.7)
WBC # BLD AUTO: 8.45 K/UL (ref 3.9–12.7)

## 2024-11-08 PROCEDURE — 83735 ASSAY OF MAGNESIUM: CPT | Performed by: INTERNAL MEDICINE

## 2024-11-08 PROCEDURE — 84100 ASSAY OF PHOSPHORUS: CPT | Performed by: INTERNAL MEDICINE

## 2024-11-08 PROCEDURE — 36415 COLL VENOUS BLD VENIPUNCTURE: CPT | Performed by: INTERNAL MEDICINE

## 2024-11-08 PROCEDURE — 84550 ASSAY OF BLOOD/URIC ACID: CPT | Performed by: INTERNAL MEDICINE

## 2024-11-08 PROCEDURE — 85025 COMPLETE CBC W/AUTO DIFF WBC: CPT | Performed by: INTERNAL MEDICINE

## 2024-11-08 PROCEDURE — 83970 ASSAY OF PARATHORMONE: CPT | Performed by: INTERNAL MEDICINE

## 2024-11-08 PROCEDURE — 80053 COMPREHEN METABOLIC PANEL: CPT | Performed by: INTERNAL MEDICINE

## 2024-11-20 ENCOUNTER — INFUSION (OUTPATIENT)
Dept: INFUSION THERAPY | Facility: HOSPITAL | Age: 34
End: 2024-11-20
Payer: MEDICARE

## 2024-11-20 VITALS
TEMPERATURE: 98 F | DIASTOLIC BLOOD PRESSURE: 73 MMHG | SYSTOLIC BLOOD PRESSURE: 110 MMHG | HEART RATE: 61 BPM | OXYGEN SATURATION: 99 % | RESPIRATION RATE: 16 BRPM

## 2024-11-20 DIAGNOSIS — E26.9 HYPERALDOSTERONISM, UNSPECIFIED: ICD-10-CM

## 2024-11-20 DIAGNOSIS — D63.1 ANEMIA OF CHRONIC RENAL FAILURE, STAGE 4 (SEVERE): ICD-10-CM

## 2024-11-20 DIAGNOSIS — N39.0 UTI (URINARY TRACT INFECTION), UNCOMPLICATED: ICD-10-CM

## 2024-11-20 DIAGNOSIS — R10.9 RIGHT FLANK PAIN: ICD-10-CM

## 2024-11-20 DIAGNOSIS — N25.81 SECONDARY HYPERPARATHYROIDISM: ICD-10-CM

## 2024-11-20 DIAGNOSIS — E79.0 HYPERURICEMIA: ICD-10-CM

## 2024-11-20 DIAGNOSIS — N18.4 ANEMIA OF CHRONIC RENAL FAILURE, STAGE 4 (SEVERE): ICD-10-CM

## 2024-11-20 DIAGNOSIS — R80.1 PERSISTENT PROTEINURIA: ICD-10-CM

## 2024-11-20 DIAGNOSIS — I10 HYPERTENSION, ESSENTIAL: ICD-10-CM

## 2024-11-20 DIAGNOSIS — E55.9 VITAMIN D DEFICIENCY: ICD-10-CM

## 2024-11-20 DIAGNOSIS — E87.20 ACIDOSIS: ICD-10-CM

## 2024-11-20 DIAGNOSIS — R60.0 LOCALIZED EDEMA: ICD-10-CM

## 2024-11-20 DIAGNOSIS — N18.4 CHRONIC KIDNEY DISEASE, STAGE IV (SEVERE): Primary | ICD-10-CM

## 2024-11-20 DIAGNOSIS — D50.9 IRON DEFICIENCY ANEMIA, UNSPECIFIED IRON DEFICIENCY ANEMIA TYPE: ICD-10-CM

## 2024-11-20 LAB
ANION GAP SERPL CALC-SCNC: 12 MMOL/L (ref 8–16)
BUN SERPL-MCNC: 21 MG/DL (ref 6–20)
CALCIUM SERPL-MCNC: 9.6 MG/DL (ref 8.7–10.5)
CHLORIDE SERPL-SCNC: 107 MMOL/L (ref 95–110)
CO2 SERPL-SCNC: 19 MMOL/L (ref 23–29)
CREAT SERPL-MCNC: 2.9 MG/DL (ref 0.5–1.4)
EST. GFR  (NO RACE VARIABLE): 21 ML/MIN/1.73 M^2
GLUCOSE SERPL-MCNC: 94 MG/DL (ref 70–110)
POTASSIUM SERPL-SCNC: 3.7 MMOL/L (ref 3.5–5.1)
SODIUM SERPL-SCNC: 138 MMOL/L (ref 136–145)

## 2024-11-20 PROCEDURE — 96360 HYDRATION IV INFUSION INIT: CPT

## 2024-11-20 PROCEDURE — 80048 BASIC METABOLIC PNL TOTAL CA: CPT

## 2024-11-20 PROCEDURE — 25000003 PHARM REV CODE 250: Performed by: INTERNAL MEDICINE

## 2024-11-20 PROCEDURE — 96361 HYDRATE IV INFUSION ADD-ON: CPT

## 2024-11-20 RX ORDER — HEPARIN 100 UNIT/ML
500 SYRINGE INTRAVENOUS
OUTPATIENT
Start: 2024-11-20

## 2024-11-20 RX ORDER — SODIUM CHLORIDE 0.9 % (FLUSH) 0.9 %
10 SYRINGE (ML) INJECTION
OUTPATIENT
Start: 2024-11-20

## 2024-11-20 RX ADMIN — SODIUM CHLORIDE 1000 ML: 9 INJECTION, SOLUTION INTRAVENOUS at 12:11

## 2024-11-20 NOTE — PLAN OF CARE
Pt arrived to unit accompanied by family member. Pt had labs drawn and received fluids. Pt received fluids with no S&S of complications and discharged off unit assisted by family member.